# Patient Record
Sex: FEMALE | Race: WHITE | NOT HISPANIC OR LATINO | Employment: OTHER | URBAN - METROPOLITAN AREA
[De-identification: names, ages, dates, MRNs, and addresses within clinical notes are randomized per-mention and may not be internally consistent; named-entity substitution may affect disease eponyms.]

---

## 2017-04-26 ENCOUNTER — OFFICE VISIT (OUTPATIENT)
Dept: OPTOMETRY | Facility: CLINIC | Age: 47
End: 2017-04-26

## 2017-04-26 DIAGNOSIS — H52.03 HYPEROPIA WITH PRESBYOPIA, BILATERAL: Primary | ICD-10-CM

## 2017-04-26 DIAGNOSIS — H52.4 HYPEROPIA WITH PRESBYOPIA, BILATERAL: Primary | ICD-10-CM

## 2017-04-26 ASSESSMENT — REFRACTION_CURRENTRX
OS_BRAND: BIOFINITY MULTIFOCAL
OD_DIAMETER: 14.0
OS_ADD: +1.00D
OD_SPHERE: +2.00
OD_BRAND: BIOFINITY MULTIFOCAL
OS_BASECURVE: 8.6
OD_SPHERE: +2.00
OS_SPHERE: +2.00
OS_DIAMETER: 14.0
OS_BRAND: BIOFINITY MULTIFOCAL
OD_BRAND: BIOFINITY
OD_BASECURVE: 8.6
OD_DIAMETER: 14.0
OD_SPHERE: +2.00
OS_SPHERE: +1.75
OS_BASECURVE: 8.6
OD_ADD: +1.50D
OD_BASECURVE: 8.6
OD_DIAMETER: 14.0
OD_BASECURVE: 8.6
OD_SPHERE: +2.25
OD_BASECURVE: 8.6
OD_BRAND: BIOFINITY MULTIFOCAL
OD_BRAND: BIOFINITY MULTIFOCAL
OS_SPHERE: +2.00
OS_BRAND: BIOFINITY MULTIFOCAL
OS_BASECURVE: 8.6
OS_ADD: +1.50N
OS_DIAMETER: 14.0
OD_ADD: +1.50D
OS_BASECURVE: 8.6
OD_SPHERE: +2.25
OD_BASECURVE: 8.6
OS_BRAND: BIOFINITY
OS_DIAMETER: 14.0
OD_BRAND: BIOFINITY
OS_BASECURVE: 8.6
OS_SPHERE: +2.00
OD_ADD: +1.00D
OS_ADD: +1.50D
OS_DIAMETER: 14.0
OS_ADD: +1.50D
OD_DIAMETER: 14.0
OD_DIAMETER: 14.0
OS_SPHERE: +1.75
OS_BRAND: BIOFINITY MULTIFOCAL
OS_DIAMETER: 14.0

## 2017-04-26 ASSESSMENT — REFRACTION_MANIFEST
OS_ADD: +1.50
OS_CYLINDER: +0.75
OD_SPHERE: +1.75
OD_AXIS: 175
OS_AXIS: 005
OD_ADD: +1.50
OS_SPHERE: +1.50
OD_CYLINDER: +0.75

## 2017-04-26 ASSESSMENT — REFRACTION_WEARINGRX
OS_CYLINDER: +0.75
OD_CYLINDER: +0.50
OD_SPHERE: +1.50
OS_SPHERE: +1.00
OD_AXIS: 160
OS_AXIS: 180

## 2017-04-26 ASSESSMENT — SLIT LAMP EXAM - LIDS
COMMENTS: NORMAL
COMMENTS: NORMAL

## 2017-04-26 ASSESSMENT — VISUAL ACUITY
OD_CC: 20/20
OS_CC: 20/20-1
CORRECTION_TYPE: GLASSES
METHOD: SNELLEN - LINEAR

## 2017-04-26 ASSESSMENT — TONOMETRY
OD_IOP_MMHG: 11
OS_IOP_MMHG: 12
IOP_METHOD: APPLANATION

## 2017-04-26 ASSESSMENT — EXTERNAL EXAM - RIGHT EYE: OD_EXAM: NORMAL

## 2017-04-26 ASSESSMENT — EXTERNAL EXAM - LEFT EYE: OS_EXAM: NORMAL

## 2017-04-26 ASSESSMENT — CONF VISUAL FIELD
OS_NORMAL: 1
OD_NORMAL: 1

## 2017-04-26 ASSESSMENT — CUP TO DISC RATIO
OS_RATIO: 0.25
OD_RATIO: 0.25

## 2017-04-26 NOTE — MR AVS SNAPSHOT
After Visit Summary   4/26/2017    Edwina Wynne    MRN: [id number]           Patient Information     Date Of Birth          1970        Visit Information        Provider Department      4/26/2017 1:30 PM Damari Dunn, [facility]        Today's Diagnoses     Hyperopia with presbyopia, bilateral    -  1       Follow-ups after your visit        Who to contact     Please call your clinic at [phone] to:    Ask questions about your health    Make or cancel appointments    Discuss your medicines    Learn about your test results    Speak to your doctor   If you have compliments or concerns about an experience at your clinic, or if you wish to file a complaint, please contact [facility] Patient Relations at [phone] or email us at [email or web address]         Additional Information About Your Visit        MyChart Information     [email or web address] gives you secure access to your electronic health record. If you see a primary care provider, you can also send messages to your care team and make appointments. If you have questions, please call your primary care clinic.  If you do not have a primary care provider, please call [phone] and they will assist you.      INNFOCUS is an electronic gateway that provides easy, online access to your medical records. With INNFOCUS, you can request a clinic appointment, read your test results, renew a prescription or communicate with your care team.     To access your existing account, please contact your [facility] or call [phone] for assistance.        Care EveryWhere ID     This is your Care EveryWhere ID. This could be used by other organizations to access your [place] medical records  [id number]         Blood Pressure from Last 3 Encounters:   10/13/16 129/78   10/12/16 128/74   09/27/16 128/72    Weight from Last 3 Encounters:   10/13/16 71.2 kg (157 lb)    10/12/16 71.2 kg (157 lb)   09/27/16 73.4 kg (161 lb 12.8 oz)              We Performed the Following     HC CONTACT LENS FITTING COSMETIC LVL 1 (88804.011)     REFRACTION [93621]        Primary Care Provider Office Phone # Fax #    Blanca Jennifer Heard [phone]       [facility] [address] [place], [place] [address]E [facility] [address]        Thank you!     Thank you for choosing EYE CLINIC  for your care. Our goal is always to provide you with excellent care. Hearing back from our patients is one way we can continue to improve our services. Please take a few minutes to complete the written survey that you may receive in the mail after your visit with us. Thank you!             Your Updated Medication List - Protect others around you: Learn how to safely use, store and throw away your medicines at www.disposemymeds.org.          This list is accurate as of: 4/26/17 11:59 PM.  Always use your most recent med list.                   Brand Name Dispense Instructions for use    atenolol 50 MG tablet    TENORMIN    90 tablet    Take 1 tablet (50 mg) by mouth daily       CALCIUM + D PO      Take 1 tablet by mouth daily.       IBUPROFEN PO      Take 600 mg by mouth every 4 hours as needed for moderate pain

## 2017-04-27 NOTE — PROGRESS NOTES
A/P  1.) Hyperopia/Presbyopia OU  -Now symptomatic for near - updated spec Rx to include bifocal  -Refit to Biofinity MF, but does not tolerate any distance blur on dominant OD lens  -Trial with distance OD, distance center MF OS. Reviewed adaptation and expectations  -Okay to order if working well, otherwise RTC prn for recheck

## 2017-05-26 DIAGNOSIS — I10 ESSENTIAL HYPERTENSION: ICD-10-CM

## 2017-05-26 NOTE — TELEPHONE ENCOUNTER
Atenolol 50mg  Last Written Prescription Date: 2/17/16  Last Fill Quantity: 90, # refills: 3    Last Office Visit with G, P or [facility] prescribing provider:  4/26/17   Future Office Visit:        BP Readings from Last 3 Encounters:   10/13/16 129/78   10/12/16 128/74   09/27/16 128/72

## 2017-05-29 RX ORDER — ATENOLOL 50 MG/1
50 TABLET ORAL DAILY
Qty: 90 TABLET | Refills: 3 | Status: SHIPPED | OUTPATIENT
Start: 2017-05-29 | End: 2017-07-12

## 2017-07-12 ENCOUNTER — OFFICE VISIT (OUTPATIENT)
Dept: INTERNAL MEDICINE | Facility: CLINIC | Age: 47
End: 2017-07-12
Attending: INTERNAL MEDICINE
Payer: COMMERCIAL

## 2017-07-12 VITALS
HEART RATE: 91 BPM | DIASTOLIC BLOOD PRESSURE: 78 MMHG | WEIGHT: 173 LBS | SYSTOLIC BLOOD PRESSURE: 120 MMHG | BODY MASS INDEX: 27.8 KG/M2 | HEIGHT: 66 IN

## 2017-07-12 DIAGNOSIS — N89.8 VAGINAL DISCHARGE: ICD-10-CM

## 2017-07-12 DIAGNOSIS — I10 ESSENTIAL HYPERTENSION: ICD-10-CM

## 2017-07-12 DIAGNOSIS — R21 RASH AND NONSPECIFIC SKIN ERUPTION: ICD-10-CM

## 2017-07-12 DIAGNOSIS — K64.4 EXTERNAL HEMORRHOIDS: ICD-10-CM

## 2017-07-12 DIAGNOSIS — Z00.00 ROUTINE GENERAL MEDICAL EXAMINATION AT A HEALTH CARE FACILITY: Primary | ICD-10-CM

## 2017-07-12 LAB
MICRO REPORT STATUS: NORMAL
SPECIMEN SOURCE: NORMAL
WET PREP SPEC: NORMAL

## 2017-07-12 PROCEDURE — 87210 SMEAR WET MOUNT SALINE/INK: CPT | Performed by: INTERNAL MEDICINE

## 2017-07-12 PROCEDURE — 99213 OFFICE O/P EST LOW 20 MIN: CPT | Mod: ZF

## 2017-07-12 PROCEDURE — 87591 N.GONORRHOEAE DNA AMP PROB: CPT | Performed by: INTERNAL MEDICINE

## 2017-07-12 PROCEDURE — 87491 CHLMYD TRACH DNA AMP PROBE: CPT | Performed by: INTERNAL MEDICINE

## 2017-07-12 RX ORDER — ATENOLOL 50 MG/1
50 TABLET ORAL DAILY
Qty: 90 TABLET | Refills: 3 | Status: SHIPPED | OUTPATIENT
Start: 2017-07-12 | End: 2017-10-10

## 2017-07-12 NOTE — LETTER
7/20/2017         Edwina Wynne   [address]  [facility] [address]        Dear Ms. Wynne:    Your labs were reviewed by Blanca Heard MD and are within acceptable ranges.  No changes are recommended.     Results for orders placed or performed in visit on 07/12/17   Wet Prep (Collected in Clinic)   Result Value Ref Range    Specimen Description Vagina     Wet Prep       No PMNs seen  No clue cells seen  No Trichomonas seen  No yeast seen      Micro Report Status FINAL 07/12/2017    Chlamydia PCR (Clinic Collect)   Result Value Ref Range    Specimen Description Cervical     Chlamydia Trachomatis PCR  NEG     Negative   Negative for C. trachomatis rRNA by transcription mediated amplification.   A negative result by transcription mediated amplification does not preclude the   presence of C. trachomatis infection because results are dependent on proper   and adequate collection, absence of inhibitors, and sufficient rRNA to be   detected.     Neisseria gonorrhoeae PCR   Result Value Ref Range    Specimen Descrip Cervical     N Gonorrhea PCR  NEG     Negative   Negative for N. gonorrhoeae rRNA by transcription mediated amplification.   A negative result by transcription mediated amplification does not preclude the   presence of N. gonorrhoeae infection because results are dependent on proper   and adequate collection, absence of inhibitors, and sufficient rRNA to be   detected.           Please note that test explanations are brief and do not reflect all diagnostic uses.  If you have any questions or concerns, please call the clinic at [phone].      Sincerely,      Brandy Jay sent on behalf of  Blanca Heard MD

## 2017-07-12 NOTE — LETTER
7/20/2017         Edwina Wynne   [address]  [facility] [address]        Dear Ms. Wynne:    Your wet prep was not consistent with infection.     Results for orders placed or performed in visit on 07/12/17   Wet Prep (Collected in Clinic)   Result Value Ref Range    Specimen Description Vagina     Wet Prep       No PMNs seen  No clue cells seen  No Trichomonas seen  No yeast seen      Micro Report Status FINAL 07/12/2017    Chlamydia PCR (Clinic Collect)   Result Value Ref Range    Specimen Description Cervical     Chlamydia Trachomatis PCR  NEG     Negative   Negative for C. trachomatis rRNA by transcription mediated amplification.   A negative result by transcription mediated amplification does not preclude the   presence of C. trachomatis infection because results are dependent on proper   and adequate collection, absence of inhibitors, and sufficient rRNA to be   detected.     Neisseria gonorrhoeae PCR   Result Value Ref Range    Specimen Descrip Cervical     N Gonorrhea PCR  NEG     Negative   Negative for N. gonorrhoeae rRNA by transcription mediated amplification.   A negative result by transcription mediated amplification does not preclude the   presence of N. gonorrhoeae infection because results are dependent on proper   and adequate collection, absence of inhibitors, and sufficient rRNA to be   detected.           Please note that test explanations are brief and do not reflect all diagnostic uses.  If you have any questions or concerns, please call the clinic at [phone].      Sincerely,      Brandy Jay sent on behalf of  Blanca Heard MD

## 2017-07-12 NOTE — PROGRESS NOTES
SUBJECTIVE:   CC: Edwina Wynne is an 47 year old woman who presents for preventive health visit.     Healthy Habits:    Do you get at least three servings of calcium containing foods daily (dairy, green leafy vegetables, etc.)? yes    Amount of exercise or daily activities, outside of work: not regualr    Problems taking medications regularly No    Medication side effects: No    Have you had an eye exam in the past two years? yes    Do you see a dentist twice per year? yes    Do you have sleep apnea, excessive snoring or daytime drowsiness?no      Patient reports vaginal discharge for the last few days. She describes it as foul smelling and white. She denies pain or fever. She denies new sexual partners. She is sexually active, not using condoms.   -------------------------------------    Today's PHQ-2 Score:   PHQ-2 ( 1999 Pf[employer]) 2/17/2016 6/7/2012   Q1: Little interest or pleasure in doing things - 0   Q2: Feeling down, depressed or hopeless - 1   PHQ-2 Score - 1   Q1: Little interest or pleasure in doing things Not at all -   Q2: Feeling down, depressed or hopeless Not at all -   PHQ-2 Score 0 -       Abuse: Current or Past(Physical, Sexual or Emotional)- No  Do you feel safe in your environment - Yes    Social History   Substance Use Topics     Smoking status: Former Smoker     Packs/day: 1.00     Years: 15.00     Types: Cigarettes     Quit date: 1/1/2000     Smokeless tobacco: Never Used     Alcohol use No      Comment: occas     The patient does not drink >3 drinks per day nor >7 drinks per week.    Reviewed orders with patient.  Reviewed health maintenance and updated orders accordingly - Yes  Labs reviewed in EPIC    Patient under age 50, mutual decision reflected in health maintenance.      Pertinent mammograms are reviewed under the imaging tab.  History of abnormal Pap smear: NO - age 30-65 PAP every 5 years with negative HPV co-testing recommended    Reviewed and updated as needed this  "visit by clinical staff  Tobacco  Allergies  Meds         Reviewed and updated as needed this visit by Provider        Past Medical History:   Diagnosis Date     Anemia     PREGNANCY     Herpes simplex without mention of complication      HSV      Hypertension     Gestational     PONV (postoperative nausea and vomiting)     POST C SECTION     Pre-eclampsia       Past Surgical History:   Procedure Laterality Date     C BREAST PROSTHESIS, NOS  2004      SECTION       HERNIORRHAPHY VENTRAL  10/26/2012    Procedure: HERNIORRHAPHY VENTRAL;  Open Ventral Hernia Repair  ;  Surgeon: Shaun Clinton MD;  Location: [facility] OR     LAPAROSCOPY DIAGNOSTIC (GYN) Bilateral 2016    Procedure: LAPAROSCOPY DIAGNOSTIC (GYN);  Surgeon: Ventura Flower MD;  Location: [facility]     LAPAROTOMY MINI, TUBAL LIGATION (POST PARTUM), COMBINED  2012    Procedure:COMBINED LAPAROTOMY MINI, TUBAL LIGATION (POST PARTUM); Surgeon:RAVEN YARBROUGH; Location:[facility] L[facility]D     TUBOPLASTY REANASTOMOSIS Bilateral 2016    Procedure: TUBOPLASTY REANASTOMOSIS;  Surgeon: Ventura Flower MD;  Location: [facility]       ROS:  C: NEGATIVE for fever, chills, change in weight  I: NEGATIVE for worrisome rashes, moles or lesions  E: NEGATIVE for vision changes or irritation  ENT: NEGATIVE for ear, mouth and throat problems  R: NEGATIVE for significant cough or SOB  B: NEGATIVE for masses, tenderness or discharge  CV: NEGATIVE for chest pain, palpitations or peripheral edema  GI: NEGATIVE for nausea, abdominal pain, heartburn, or change in bowel habits  : NEGATIVE for unusual urinary or vaginal symptoms. Periods are regular.  M: NEGATIVE for significant arthralgias or myalgia  N: NEGATIVE for weakness, dizziness or paresthesias  P: NEGATIVE for changes in mood or affect    OBJECTIVE:   /78  Pulse 91  Ht 1.676 m (5' 6\")  Wt 78.5 kg (173 lb)  LMP 2017  Breastfeeding? No  BMI 27.92 kg/m2  EXAM:  GENERAL: healthy, alert and no " distress  EYES: Eyes grossly normal to inspection, PERRL and conjunctivae and sclerae normal  HENT: ear canals and TM's normal, nose and mouth without ulcers or lesions  NECK: no adenopathy, no asymmetry, masses, or scars and thyroid normal to palpation  RESP: lungs clear to auscultation - no rales, rhonchi or wheezes  CV: regular rate and rhythm, normal S1 S2, no S3 or S4, no murmur, click or rub, no peripheral edema and peripheral pulses strong  ABDOMEN: soft, nontender, no hepatosplenomegaly, no masses and bowel sounds normal  : normal external genitalia, cervix without masses or lesions, scant vaginal discharge present  MS: no gross musculoskeletal defects noted, no edema  SKIN: no suspicious lesions or rashes  NEURO: Normal strength and tone, mentation intact and speech normal  PSYCH: mentation appears normal, affect normal/bright    ASSESSMENT/PLAN:   1. Routine general medical examination at a health care facility  Patient is up to date on PAP smear. Recommend screening for hyperlipidemia and impaired glucose metabolism. Vaccines are up to date.   - Lipid Profile; Future  - Basic Metabolic Panel; Future    2. Essential hypertension  Blood pressure is at goal. Will continue with current medical therapy without changes.   - atenolol (TENORMIN) 50 MG tablet; Take 1 tablet (50 mg) by mouth daily  Dispense: 90 tablet; Refill: 3    3. Vaginal discharge  Wet prep and GC/chlamydia swabs were collected today. She will be advised on test results accordingly.   - Wet Prep (Collected in Clinic)  - Chlamydia PCR (Clinic Collect)  - Neisseria gonorrhoeae PCR    4. External hemorrhoids  Referral to colorectal Surgery was given to the patient.   - COLORECTAL SURGERY REFERRAL    5. Rash and nonspecific skin eruption  Referral to Dermatology for skin exam was entered.  - DERMATOLOGY REFERRAL    COUNSELING:   Reviewed preventive health counseling, as reflected in patient instructions       Regular exercise       Healthy  "diet/nutrition       Vision screening       Hearing screening         reports that she quit smoking about 17 years ago. Her smoking use included Cigarettes. She has a 15.00 pack-year smoking history. She has never used smokeless tobacco.    Estimated body mass index is 27.92 kg/(m^2) as calculated from the following:    Height as of this encounter: 1.676 m (5' 6\").    Weight as of this encounter: 78.5 kg (173 lb).       Counseling Resources:  ATP IV Guidelines  Pooled Cohorts Equation Calculator  Breast Cancer Risk Calculator  FRAX Risk Assessment  ICSI Preventive Guidelines  Dietary Guidelines for Americans, 2010  [employer] [employer]'s MyPlate  ASA Prophylaxis  Lung CA Screening    Blanca Heard MD  [facility]   "

## 2017-07-12 NOTE — MR AVS SNAPSHOT
After Visit Summary   7/12/2017    Edwina Wynne    MRN: [id number]           Patient Information     Date Of Birth          1970        Visit Information        Provider Department      7/12/2017 12:40 PM Blanca Heard MD [facility]         Today's Diagnoses     Routine general medical examination at a health care facility    -  1    Essential hypertension        Vaginal discharge        External hemorrhoids        Rash and nonspecific skin eruption          Care Instructions      Preventive Health Recommendations  Female Ages 40 to 49    Yearly exam:     See your health care provider every year in order to  1. Review health changes.   2. Discuss preventive care.    3. Review your medicines if your doctor prescribed any.      Get a Pap test every three years (unless you have an abnormal result and your provider advises testing more often).      If you get Pap tests with HPV test, you only need to test every 5 years, unless you have an abnormal result. You do not need a Pap test if your uterus was removed (hysterectomy) and you have not had cancer.      You should be tested each year for STDs (sexually transmitted diseases), if you're at risk.       Ask your doctor if you should have a mammogram.      Have a colonoscopy (test for colon cancer) if someone in your family has had colon cancer or polyps before age 50.       Have a cholesterol test every 5 years.       Have a diabetes test (fasting glucose) after age 45. If you are at risk for diabetes, you should have this test every 3 years.    Shots: Get a flu shot each year. Get a tetanus shot every 10 years.     Nutrition:     Eat at least 5 servings of fruits and vegetables each day.    Eat whole-grain bread, whole-wheat pasta and brown rice instead of white grains and rice.    Talk to your provider about Calcium and Vitamin D.     Lifestyle    Exercise at least 150 minutes a week (an average of 30  minutes a day, 5 days a week). This will help you control your weight and prevent disease.    Limit alcohol to one drink per day.    No smoking.     Wear sunscreen to prevent skin cancer.    See your dentist every six months for an exam and cleaning.          Follow-ups after your visit        Additional Services     COLORECTAL SURGERY REFERRAL       Your provider has referred you to: [facility]: Colon and Rectal Surgery Clinic [facility] [phone]   [email or web address]    Referral Reason(s): Hemorrhoids  Special Concerns: None  This referral is: Elective (week +)  It is OK to leave a message on patient's voicemail.    Please be aware that coverage of these services is subject to the terms and limitations of your health insurance plan.  Call member services at your health plan with any benefit or coverage questions.      Please bring the following with you to your appointment:    (1) Any X-Rays, CTs or MRIs which have been performed.  Contact the facility where they were done to arrange for  prior to your scheduled appointment.    (2) List of current medications  (3) This referral request   (4) Any documents/labs given to you for this referral            DERMATOLOGY REFERRAL       Your provider has referred you to: [facility]: Dermatology [facility] [phone]   [email or web address]    Please be aware that coverage of these services is subject to the terms and limitations of your health insurance plan.  Call member services at your health plan with any benefit or coverage questions.      Please bring the following with you to your appointment:    (1) Any X-Rays, CTs or MRIs which have been performed.  Contact the facility where they were done to arrange for  prior to your scheduled appointment.    (2) List of current medications  (3) This referral request   (4) Any documents/labs given to you for this referral    "               Who to contact     Please call your clinic at [phone] to:    Ask questions about your health    Make or cancel appointments    Discuss your medicines    Learn about your test results    Speak to your doctor   If you have compliments or concerns about an experience at your clinic, or if you wish to file a complaint, please contact [facility] Patient Relations at [phone] or email us at [email or web address]         Additional Information About Your Visit        TSAT Grouphart Information     N4MDt gives you secure access to your electronic health record. If you see a primary care provider, you can also send messages to your care team and make appointments. If you have questions, please call your primary care clinic.  If you do not have a primary care provider, please call [phone] and they will assist you.      Convo is an electronic gateway that provides easy, online access to your medical records. With Convo, you can request a clinic appointment, read your test results, renew a prescription or communicate with your care team.     To access your existing account, please contact your [facility] or call [phone] for assistance.        Care EveryWhere ID     This is your Care EveryWhere ID. This could be used by other organizations to access your [place] medical records  [id number]        Your Vitals Were     Pulse Height Last Period Breastfeeding? BMI (Body Mass Index)       91 1.676 m (5' 6\") 06/25/2017 No 27.92 kg/m2        Blood Pressure from Last 3 Encounters:   07/12/17 120/78   10/13/16 129/78   10/12/16 128/74    Weight from Last 3 Encounters:   07/12/17 78.5 kg (173 lb)   10/13/16 71.2 kg (157 lb)   10/12/16 71.2 kg (157 lb)              We Performed the Following     Basic Metabolic Panel     Chlamydia PCR (Clinic Collect)     COLORECTAL SURGERY REFERRAL     DERMATOLOGY REFERRAL     Lipid Profile     " Neisseria gonorrhoeae PCR     Wet Prep (Collected in Clinic)          Where to get your medicines      These medications were sent to [facility] - [place] [employer], [place] - [address]   [address] , [facility] [address]    Hours:  Mon-Fri 8:30am-5:00pm Toll Free [phone] Phone:  [phone]     atenolol 50 MG tablet          Primary Care Provider Office Phone # Fax #    Blanca Jennifer Heard [phone]       [facility] [address]  [facility] [address]        Equal Access to Services     [facility]: Hadii jeanine Rodriguez, waesauda ning, qaybta kaalmada emily, padmini tejada . So [facility] [phone].    ATENCIÓN: Si habla español, tiene a knight disposición servicios gratuitos de asistencia lingüística. Ll[facility] [phone].    We comply with applicable federal civil rights laws and [place] laws. We do not discriminate on the basis of race, color, national origin, age, disability sex, sexual orientation or gender identity.            Thank you!     Thank you for choosing [facility]   for your care. Our goal is always to provide you with excellent care. Hearing back from our patients is one way we can continue to improve our services. Please take a few minutes to complete the written survey that you may receive in the mail after your visit with us. Thank you!             Your Updated Medication List - Protect others around you: Learn how to safely use, store and throw away your medicines at www.disposemymeds.org.          This list is accurate as of: 7/12/17  1:13 PM.  Always use your most recent med list.                   Brand Name Dispense Instructions for use Diagnosis    atenolol 50 MG tablet    TENORMIN    90 tablet    Take 1 tablet (50 mg) by mouth daily    Essential hypertension       CALCIUM + D PO      Take 1 tablet by mouth daily.        IBUPROFEN PO      Take 600 mg  by mouth every 4 hours as needed for moderate pain

## 2017-07-13 LAB
C TRACH DNA SPEC QL NAA+PROBE: NORMAL
N GONORRHOEA DNA SPEC QL NAA+PROBE: NORMAL
SPECIMEN SOURCE: NORMAL
SPECIMEN SOURCE: NORMAL

## 2017-07-17 ENCOUNTER — PRE VISIT (OUTPATIENT)
Dept: SURGERY | Facility: CLINIC | Age: 47
End: 2017-07-17

## 2017-07-17 NOTE — TELEPHONE ENCOUNTER
1.  Date/reason for appt: 8/1/17- hemorrhoid     2.  Referring provider: Dr. Blanca Heard     3.  Call to patient (Yes / No - short description): No - pt is referred. Records are in Epic.     4.  Previous care at / records requested from:     1. [facility]  7/12/17

## 2017-08-10 ENCOUNTER — OFFICE VISIT (OUTPATIENT)
Dept: SURGERY | Facility: CLINIC | Age: 47
End: 2017-08-10

## 2017-08-10 VITALS
DIASTOLIC BLOOD PRESSURE: 83 MMHG | SYSTOLIC BLOOD PRESSURE: 130 MMHG | HEIGHT: 66 IN | BODY MASS INDEX: 27.43 KG/M2 | HEART RATE: 54 BPM | OXYGEN SATURATION: 98 % | WEIGHT: 170.7 LBS

## 2017-08-10 DIAGNOSIS — K64.4 ANAL SKIN TAG: Primary | ICD-10-CM

## 2017-08-10 RX ORDER — KETOCONAZOLE 20 MG/ML
SHAMPOO TOPICAL
Refills: 11 | COMMUNITY
Start: 2017-08-03 | End: 2021-02-10

## 2017-08-10 ASSESSMENT — ENCOUNTER SYMPTOMS
SLEEP DISTURBANCES DUE TO BREATHING: 0
LEG SWELLING: 0
EXERCISE INTOLERANCE: 0
TACHYCARDIA: 0
HYPERTENSION: 1
ORTHOPNEA: 0
LEG PAIN: 1
SYNCOPE: 0
CLAUDICATION: 0
PALPITATIONS: 0
SKIN CHANGES: 0
HYPOTENSION: 0
NAIL CHANGES: 0
POOR WOUND HEALING: 0
LIGHT-HEADEDNESS: 1

## 2017-08-10 ASSESSMENT — PAIN SCALES - GENERAL: PAINLEVEL: NO PAIN (0)

## 2017-08-10 NOTE — LETTER
8/10/2017      RE: Edwina Wynne  [address]  [facility] [address]       Colon and Rectal Surgery Consult Clinic Note    Date: 8/10/2017     Referring provider:  Blanca Heard MD  [facility]  [address] [place] [address]     RE: Edwina Wynne  : 1970  VISH: 8/10/2017    Edwina Wynne is a very pleasant 47 year old female without a significant past medical history with a recent diagnosis of external hemorrhoid.  Given these findings they were subsequently sent to the Colon and Rectal Surgery Clinic for an opinion on this and a new patient consultation.     Edwina developed an external hemorrhoid about 5 years ago with the birth of her last child. This is never resolved and she would like further management of it if possible. It is not painful and she denies any bleeding. It is always on the outside and she denies any tissue that comes in and out with bowel movements. She does have some difficulty keeping it clean after bowel movements. She states that it is more of an annoyance than anything. She has never had any anorectal surgical procedures in the past. She is not on any blood thinners. She denies any leakage of stool or mucus. She has normal, soft bowel movements without any constipation or diarrhea.    Assessment/Plan: 47 year old female with anal skin tag. On exam she has an anal skin tag in the anterior midline that measures just over 1 cm long. This is a fairly wide base so would not be amenable to removal by me in clinic today. We did discuss surgical removal of this. I discussed with her the risks of pain, bleeding, and that we cannot promise a good cosmetic result and that she can develop more skin tags even from the surgery itself. She states an understanding of these risks. She would like to think about her options and will get back to us on whether she wants further intervention. In the meantime I advised her to try using  baby wipes instead of toilet paper and to notify the clinic if she has any further questions or concerns.  Patient's questions were answered to her stated satisfaction and she is in agreement with this plan.    Medical history:  Past Medical History:   Diagnosis Date     Anemia     PREGNANCY     Herpes simplex without mention of complication      HSV      Hypertension     Gestational     PONV (postoperative nausea and vomiting)     POST C SECTION     Pre-eclampsia        Surgical history:  Past Surgical History:   Procedure Laterality Date     C BREAST PROSTHESIS, NOS  2004      SECTION       HERNIORRHAPHY VENTRAL  10/26/2012    Procedure: HERNIORRHAPHY VENTRAL;  Open Ventral Hernia Repair  ;  Surgeon: Shaun Clinton MD;  Location: [facility]     LAPAROSCOPY DIAGNOSTIC (GYN) Bilateral 2016    Procedure: LAPAROSCOPY DIAGNOSTIC (GYN);  Surgeon: Ventura Flower MD;  Location: [facility]     LAPAROTOMY MINI, TUBAL LIGATION (POST PARTUM), COMBINED  2012    Procedure:COMBINED LAPAROTOMY MINI, TUBAL LIGATION (POST PARTUM); Surgeon:RAVEN YARBROUGH; Location: [facility]+D     TUBOPLASTY REANASTOMOSIS Bilateral 2016    Procedure: TUBOPLASTY REANASTOMOSIS;  Surgeon: Ventura Flower MD;  Location: [facility]       Problem list:  Patient Active Problem List    Diagnosis Date Noted     Tubal occlusion 2016     Priority: Medium     Essential hypertension, benign 2013     Priority: Medium     Tubal ligation status 2013     Priority: Medium     Ventral hernia, recurrent 2013     Priority: Medium       Medications:  Current Outpatient Prescriptions   Medication Sig Dispense Refill     ketoconazole (NIZORAL) 2 % shampoo   11     atenolol (TENORMIN) 50 MG tablet Take 1 tablet (50 mg) by mouth daily 90 tablet 3     IBUPROFEN PO Take 600 mg by mouth every 4 hours as needed for moderate pain       Calcium Carbonate-Vitamin D (CALCIUM + D PO) Take 1 tablet by mouth daily.         Allergies:  No Known  "Allergies    Family history:  Family History   Problem Relation Age of Onset     Hypertension Father      Hypertension Brother      CEREBROVASCULAR DISEASE Maternal Grandmother 50     Macular Degeneration Paternal Grandmother      Eye Disorder Other      Great aunt, retinitis pigmentosa     Asthma No family hx of      C.A.D. No family hx of      DIABETES No family hx of      Breast Cancer No family hx of      Cancer - colorectal No family hx of      Prostate Cancer No family hx of      Glaucoma No family hx of        Social history:  Social History   Substance Use Topics     Smoking status: Former Smoker     Packs/day: 1.00     Years: 15.00     Types: Cigarettes     Quit date: 1/1/2000     Smokeless tobacco: Never Used     Alcohol use No      Comment: occas    Marital status: .  Occupation:  provider and nursing assistant.    Nursing Notes:   Kalyan Sanabria CMA  8/10/2017 11:14 AM  Signed  Chief Complaint   Patient presents with     Consult     Hemorrhoids       Vitals:    08/10/17 1112   BP: 130/83   Pulse: 54   SpO2: 98%   Weight: 170 lb 11.2 oz   Height: 5' 6\"       Body mass index is 27.55 kg/(m^2).    Kalyan Sanabria CMA                               Physical Examination:  /83  Pulse 54  Ht 5' 6\"  Wt 170 lb 11.2 oz  LMP 06/25/2017  SpO2 98%  BMI 27.55 kg/m2  General: alert, oriented, in no acute distress, sitting comfortably  HEENT: mucous membranes moist  Perianal external examination:  Perianal skin: Intact with no excoriation or lichenification.  Lesions: No evidence of an external lesion, nodularity, or induration in the perianal region.  Eversion of buttocks: There was not evidence of an anal fissure. Details: N/A.  Skin tags or external hemorrhoids: Yes: skin tag in the anterior midline measuring just over 1 cm long with a wide base and a smaller skin tag in the posterior midline. No bleeding, tenderness, drainage, or thrombosis..  Digital rectal examination: Was " deferred    Anoscopy: Was deferred    Total face to face time was 20 minutes, >50% counseling.    ADDIE Heck, NP-C  Colon and Rectal Surgery   [facility]    This note was created using speech recognition software and may contain unintended word substitutions.      ADDIE Heck CNP

## 2017-08-10 NOTE — PROGRESS NOTES
Colon and Rectal Surgery Consult Clinic Note    Date: 8/10/2017     Referring provider:  Blanca Heard MD  [facility]  [address] [place] [address]     RE: Edwina Wynne  : 1970  VISH: 8/10/2017    Edwina Wynne is a very pleasant 47 year old female without a significant past medical history with a recent diagnosis of external hemorrhoid.  Given these findings they were subsequently sent to the Colon and Rectal Surgery Clinic for an opinion on this and a new patient consultation.     Edwina developed an external hemorrhoid about 5 years ago with the birth of her last child. This is never resolved and she would like further management of it if possible. It is not painful and she denies any bleeding. It is always on the outside and she denies any tissue that comes in and out with bowel movements. She does have some difficulty keeping it clean after bowel movements. She states that it is more of an annoyance than anything. She has never had any anorectal surgical procedures in the past. She is not on any blood thinners. She denies any leakage of stool or mucus. She has normal, soft bowel movements without any constipation or diarrhea.    Assessment/Plan: 47 year old female with anal skin tag. On exam she has an anal skin tag in the anterior midline that measures just over 1 cm long. This is a fairly wide base so would not be amenable to removal by me in clinic today. We did discuss surgical removal of this. I discussed with her the risks of pain, bleeding, and that we cannot promise a good cosmetic result and that she can develop more skin tags even from the surgery itself. She states an understanding of these risks. She would like to think about her options and will get back to us on whether she wants further intervention. In the meantime I advised her to try using baby wipes instead of toilet paper and to notify the clinic if she has any further  questions or concerns.  Patient's questions were answered to her stated satisfaction and she is in agreement with this plan.    Medical history:  Past Medical History:   Diagnosis Date     Anemia     PREGNANCY     Herpes simplex without mention of complication      HSV      Hypertension     Gestational     PONV (postoperative nausea and vomiting)     POST C SECTION     Pre-eclampsia        Surgical history:  Past Surgical History:   Procedure Laterality Date     C BREAST PROSTHESIS, NOS  2004      SECTION       HERNIORRHAPHY VENTRAL  10/26/2012    Procedure: HERNIORRHAPHY VENTRAL;  Open Ventral Hernia Repair  ;  Surgeon: Shaun Clinton MD;  Location: [facility]     LAPAROSCOPY DIAGNOSTIC (GYN) Bilateral 2016    Procedure: LAPAROSCOPY DIAGNOSTIC (GYN);  Surgeon: Ventura Flower MD;  Location: [facility]     LAPAROTOMY MINI, TUBAL LIGATION (POST PARTUM), COMBINED  2012    Procedure:COMBINED LAPAROTOMY MINI, TUBAL LIGATION (POST PARTUM); Surgeon:RAVEN YARBROUGH; Location: [facility]     TUBOPLASTY REANASTOMOSIS Bilateral 2016    Procedure: TUBOPLASTY REANASTOMOSIS;  Surgeon: Ventura Flower MD;  Location: [facility]       Problem list:  Patient Active Problem List    Diagnosis Date Noted     Tubal occlusion 2016     Priority: Medium     Essential hypertension, benign 2013     Priority: Medium     Tubal ligation status 2013     Priority: Medium     Ventral hernia, recurrent 2013     Priority: Medium       Medications:  Current Outpatient Prescriptions   Medication Sig Dispense Refill     ketoconazole (NIZORAL) 2 % shampoo   11     atenolol (TENORMIN) 50 MG tablet Take 1 tablet (50 mg) by mouth daily 90 tablet 3     IBUPROFEN PO Take 600 mg by mouth every 4 hours as needed for moderate pain       Calcium Carbonate-Vitamin D (CALCIUM + D PO) Take 1 tablet by mouth daily.         Allergies:  No Known Allergies    Family history:  Family History   Problem Relation Age of Onset      "Hypertension Father      Hypertension Brother      CEREBROVASCULAR DISEASE Maternal Grandmother 50     Macular Degeneration Paternal Grandmother      Eye Disorder Other      Great aunt, retinitis pigmentosa     Asthma No family hx of      C.A.D. No family hx of      DIABETES No family hx of      Breast Cancer No family hx of      Cancer - colorectal No family hx of      Prostate Cancer No family hx of      Glaucoma No family hx of        Social history:  Social History   Substance Use Topics     Smoking status: Former Smoker     Packs/day: 1.00     Years: 15.00     Types: Cigarettes     Quit date: 1/1/2000     Smokeless tobacco: Never Used     Alcohol use No      Comment: occas    Marital status: .  Occupation:  provider and nursing assistant.    Nursing Notes:   Kalyan Sanabria CMA  8/10/2017 11:14 AM  Signed  Chief Complaint   Patient presents with     Consult     Hemorrhoids       Vitals:    08/10/17 1112   BP: 130/83   Pulse: 54   SpO2: 98%   Weight: 170 lb 11.2 oz   Height: 5' 6\"       Body mass index is 27.55 kg/(m^2).    Kalyan Sanabria CMA                               Physical Examination:  /83  Pulse 54  Ht 5' 6\"  Wt 170 lb 11.2 oz  LMP 06/25/2017  SpO2 98%  BMI 27.55 kg/m2  General: alert, oriented, in no acute distress, sitting comfortably  HEENT: mucous membranes moist  Perianal external examination:  Perianal skin: Intact with no excoriation or lichenification.  Lesions: No evidence of an external lesion, nodularity, or induration in the perianal region.  Eversion of buttocks: There was not evidence of an anal fissure. Details: N/A.  Skin tags or external hemorrhoids: Yes: skin tag in the anterior midline measuring just over 1 cm long with a wide base and a smaller skin tag in the posterior midline. No bleeding, tenderness, drainage, or thrombosis..  Digital rectal examination: Was deferred    Anoscopy: Was deferred    Total face to face time was 20 minutes, >50% " counseling.    ADDIE Heck, NP-C  Colon and Rectal Surgery   [facility]    This note was created using speech recognition software and may contain unintended word substitutions.

## 2017-08-10 NOTE — NURSING NOTE
"Chief Complaint   Patient presents with     Consult     Hemorrhoids       Vitals:    08/10/17 1112   BP: 130/83   Pulse: 54   SpO2: 98%   Weight: 170 lb 11.2 oz   Height: 5' 6\"       Body mass index is 27.55 kg/(m^2).    Kalyan Sanabria CMA                            "

## 2017-08-10 NOTE — MR AVS SNAPSHOT
After Visit Summary   8/10/2017    Edwina Wynne    MRN: [id number]           Patient Information     Date Of Birth          1970        Visit Information        Provider Department      8/10/2017 11:30 AM Samra Vincent APRN Pending sale to [facility] Colon and Rectal Surgery        Today's Diagnoses     Anal skin tag    -  1       Follow-ups after your visit        Your next 10 appointments already scheduled     Oct 06, 2017 10:45 AM CDT   (Arrive by 10:30 AM)   New Patient Visit with Nathanael Dietrich MD   [facility] ([facility])    [address] [place] [id number] [phone]              Who to contact     Please call your clinic at [phone] to:    Ask questions about your health    Make or cancel appointments    Discuss your medicines    Learn about your test results    Speak to your doctor   If you have compliments or concerns about an experience at your clinic, or if you wish to file a complaint, please contact [facility] Patient Relations at [phone] or email us at [email or web address]         Additional Information About Your Visit        MyChart Information     [employer] gives you secure access to your electronic health record. If you see a primary care provider, you can also send messages to your care team and make appointments. If you have questions, please call your primary care clinic.  If you do not have a primary care provider, please call [phone] and they will assist you.      Swype is an electronic gateway that provides easy, online access to your medical records. With Swype, you can request a clinic appointment, read your test results, renew a prescription or communicate with your care team.     To access your existing account, please contact your [facility] or call [phone] for assistance.        Care EveryWhere  "ID     This is your Care EveryWhere ID. This could be used by other organizations to access your [facility] medical records  [id number]        Your Vitals Were     Pulse Height Last Period Pulse Oximetry BMI (Body Mass Index)       54 5' 6\" 06/25/2017 98% 27.55 kg/m2        Blood Pressure from Last 3 Encounters:   08/10/17 130/83   07/12/17 120/78   10/13/16 129/78    Weight from Last 3 Encounters:   08/10/17 170 lb 11.2 oz   07/12/17 173 lb   10/13/16 157 lb              Today, you had the following     No orders found for display       Primary Care Provider Office Phone # Fax #    [place] Jennifer Heard [phone]       [facility] [address]  [facility] [address]        Equal Access to Services     [facility]: Hadii aad ku hadasho Sodaniel, waaxda luqadaha, qaybta kaalmada emily, padmini tejada . So [facility] [phone].    ATENCIÓN: Si habla español, tiene a knight disposición servicios gratuitos de asistencia lingüística. Oc al [phone].    We comply with applicable federal civil rights laws and [place] laws. We do not discriminate on the basis of race, color, national origin, age, disability sex, sexual orientation or gender identity.            Thank you!     Thank you for choosing [facility] COLON AND RECTAL SURGERY  for your care. Our goal is always to provide you with excellent care. Hearing back from our patients is one way we can continue to improve our services. Please take a few minutes to complete the written survey that you may receive in the mail after your visit with us. Thank you!             Your Updated Medication List - Protect others around you: Learn how to safely use, store and throw away your medicines at www.disposemymeds.org.          This list is accurate as of: 8/10/17 11:55 AM.  Always use your most recent med list.                   Brand Name Dispense Instructions for use Diagnosis    atenolol 50 MG tablet    TENORMIN "    90 tablet    Take 1 tablet (50 mg) by mouth daily    Essential hypertension       CALCIUM + D PO      Take 1 tablet by mouth daily.        IBUPROFEN PO      Take 600 mg by mouth every 4 hours as needed for moderate pain        ketoconazole 2 % shampoo    NIZORAL

## 2017-08-17 ENCOUNTER — TRANSFERRED RECORDS (OUTPATIENT)
Dept: HEALTH INFORMATION MANAGEMENT | Facility: CLINIC | Age: 47
End: 2017-08-17

## 2017-08-23 ENCOUNTER — TELEPHONE (OUTPATIENT)
Dept: OBGYN | Facility: CLINIC | Age: 47
End: 2017-08-23

## 2017-08-23 NOTE — TELEPHONE ENCOUNTER
----- Message from Lawanda Pedraza sent at 8/23/2017  2:49 PM CDT -----  Regarding: Dizzy, Shortness of Breath- Dr. Heard  Contact: [phone]  PT states she has been experiencing dizzy spells on and off along with nausea and shortness of breath. PT was not experiencing this at the time of call and scheduled the next available.     If Dr. Heard thinks she needs to be seen sooner please follow up with PT at [phone].    Thank you!    Lawanda,  Call Center

## 2017-08-23 NOTE — TELEPHONE ENCOUNTER
Spoke with Edwina to discuss symptoms she reported to scheduling r/t dizzy spells, nausea, and SOB. She states that she first experienced these symptoms a year ago about every other month. She was seen by a different pcp and had a normal ekg. She says that these symptoms have slowly increased over the last year from monthly to weekly. She describes it as a feeling of lightheadedness and fogginess and worrying that she will pass out. She does NOT have shortness of breath, chest pain, blurry vision, or dizziness. She has never passed out before and does not feel like her heart is racing or is an abnormal rhythm that she can tell.     Advised that a message will be sent to Dr. Heard to see if pt should try a holter monitor before her appointment or if she thinks she should try anything else. Instructed pt on when to go to emergency room. She understood plan and had no further questions.

## 2017-08-31 NOTE — TELEPHONE ENCOUNTER
Spoke with Edwina who states that she decided to stop her atenelol because she forgot to take it a lot of the times anyway and was worried that her blood pressure was getting to low and causing her symptoms. Since she has stopped 4 days ago her symptoms have improved and her BPs have been about 112/60. Advised that if she has stopped she should continue to monitor her blood pressures daily for any changes or hypertension. She will follow up with Dr. Heard on 9/18 and will call if anything changes before then or be seen at [facility] for urgent symptoms.

## 2017-10-06 ENCOUNTER — OFFICE VISIT (OUTPATIENT)
Dept: DERMATOLOGY | Facility: CLINIC | Age: 47
End: 2017-10-06

## 2017-10-06 DIAGNOSIS — L70.8 OTHER ACNE: Primary | ICD-10-CM

## 2017-10-06 RX ORDER — TRETINOIN 0.25 MG/G
CREAM TOPICAL
Qty: 45 G | Refills: 6 | Status: SHIPPED | OUTPATIENT
Start: 2017-10-06 | End: 2020-01-13

## 2017-10-06 ASSESSMENT — PAIN SCALES - GENERAL: PAINLEVEL: NO PAIN (0)

## 2017-10-06 NOTE — LETTER
10/6/2017       RE: Edwina Wynne  [address]  [facility] [address]     Dear Colleague,    Thank you for referring your patient, Edwina Wynne, to the [facility] DERMATOLOGY at [facility]. Please see a copy of my visit note below.    [facility] Dermatology Note      Dermatology Problem List:  Dermatofibroma    Encounter Date: Oct 6, 2017    CC:   Chief Complaint   Patient presents with     Skin Check     Romana is here today for a skin check. No concerns         History of Present Illness:  This 47 year old female presents in self referral for rash on her elbows. Patient states that in July she had hot tub folliculitis on her neck and was treated with oral and topical clindamycin.  She first noticed a few small bumps on her elbows at that time that did not resemble her other rash.  Her rash resolved and the elbows did not bother her.  Within the last week she has noticed more of the bumps on her elbows. They are not painful or itchy.  She has put the topical clindamycin on her elbows and that has not helped.  She also notes one bump on her right knee. It has been there for several years and has gotten a little bigger and darker over the last few months.  It is not painful. There are no other concerning lesions.    Past Medical History:   Past Medical History:   Diagnosis Date     Anemia     PREGNANCY     Herpes simplex without mention of complication      HSV      Hypertension     Gestational     PONV (postoperative nausea and vomiting)     POST C SECTION     Pre-eclampsia      Past Surgical History:   Procedure Laterality Date     C BREAST PROSTHESIS, NOS  2004      SECTION       HERNIORRHAPHY VENTRAL  10/26/2012    Procedure: HERNIORRHAPHY VENTRAL;  Open Ventral Hernia Repair  ;  Surgeon: Shaun Clinton MD;  Location: [facility] OR     LAPAROSCOPY DIAGNOSTIC (GYN) Bilateral 2016    Procedure: LAPAROSCOPY DIAGNOSTIC  (GYN);  Surgeon: Ventura Flower MD;  Location: [facility]     LAPAROTOMY MINI, TUBAL LIGATION (POST PARTUM), COMBINED  5/24/2012    Procedure:COMBINED LAPAROTOMY MINI, TUBAL LIGATION (POST PARTUM); Surgeon:RAVEN YARBROUGH; [facility]     TUBOPLASTY REANASTOMOSIS Bilateral 9/27/2016    Procedure: TUBOPLASTY REANASTOMOSIS;  Surgeon: Ventura Flower MD;  Location: [facility]       Social History:  The patient works in a  . The patient admits to use of tanning beds years ago.    Family History:  There is no family history of skin cancer.    Medications:  Current Outpatient Prescriptions   Medication Sig Dispense Refill     ketoconazole (NIZORAL) 2 % shampoo   11     atenolol (TENORMIN) 50 MG tablet Take 1 tablet (50 mg) by mouth daily 90 tablet 3     IBUPROFEN PO Take 600 mg by mouth every 4 hours as needed for moderate pain       Calcium Carbonate-Vitamin D (CALCIUM + D PO) Take 1 tablet by mouth daily.          No Known Allergies      Review of Systems:  -As per HPI  -Constitutional: The patient denies fatigue, fevers, chills, unintended weight loss, and night sweats.  -HEENT: Patient denies nonhealing oral sores.    Physical exam:  There were no vitals taken for this visit.  -This is a well developed, well-nourished female in no acute distress, in a pleasant mood.    -Total skin excluding the undergarment areas was performed. The exam included the head/face, neck, both arms, back, both legs, digits and/or nails.   -There is a firm tan/flesh colored papule that dimples with lateral pressure on the right knee.  - Keratotic skin colored papules, located on the extensor surfaces of both elbows.  -No other lesions of concern on areas examined.     Impression/Plan:  1. Dermatofibroma: Discussed benign nature with patient and that nothing needs to be done at this time.      2. Hyperkeratosis of the elbows: these areas are not painful or bothersome. Could be related to pressure or rubbing of her elbows.    Apply Urea  cream 40% to affected areas twice daily until resolved        Follow-up prn for new or changing lesions.      Staff Involved:  I, Sonny Weiss MS4, served as a scribe for Dr. Jacek Sheehan

## 2017-10-06 NOTE — PROGRESS NOTES
[facility] Dermatology Note      Dermatology Problem List:  Dermatofibroma    Encounter Date: Oct 6, 2017    CC:   Chief Complaint   Patient presents with     Skin Check     Romana is here today for a skin check. No concerns         History of Present Illness:  This 47 year old female presents in self referral for rash on her elbows. Patient states that in July she had hot tub folliculitis on her neck and was treated with oral and topical clindamycin.  She first noticed a few small bumps on her elbows at that time that did not resemble her other rash.  Her rash resolved and the elbows did not bother her.  Within the last week she has noticed more of the bumps on her elbows. They are not painful or itchy.  She has put the topical clindamycin on her elbows and that has not helped.  She also notes one bump on her right knee. It has been there for several years and has gotten a little bigger and darker over the last few months.  It is not painful. There are no other concerning lesions.    Past Medical History:   Past Medical History:   Diagnosis Date     Anemia     PREGNANCY     Herpes simplex without mention of complication      HSV      Hypertension     Gestational     PONV (postoperative nausea and vomiting)     POST C SECTION     Pre-eclampsia      Past Surgical History:   Procedure Laterality Date     C BREAST PROSTHESIS, NOS  2004      SECTION       HERNIORRHAPHY VENTRAL  10/26/2012    Procedure: HERNIORRHAPHY VENTRAL;  Open Ventral Hernia Repair  ;  Surgeon: Shaun Clinton MD;  Location: [facility] OR     LAPAROSCOPY DIAGNOSTIC (GYN) Bilateral 2016    Procedure: LAPAROSCOPY DIAGNOSTIC (GYN);  Surgeon: Ventura Flower MD;  Location: [facility]     LAPAROTOMY MINI, TUBAL LIGATION (POST PARTUM), COMBINED  2012    Procedure:COMBINED LAPAROTOMY MINI, TUBAL LIGATION (POST PARTUM); Surgeon:RAVEN YARBROUGH; Location:[facility] L[facility]D     TUBOPLASTY REANASTOMOSIS Bilateral 2016    Procedure:  TUBOPLASTY REANASTOMOSIS;  Surgeon: Ventura Flower MD;  Location: [facility]       Social History:  The patient works in a  . The patient admits to use of tanning beds years ago.    Family History:  There is no family history of skin cancer.    Medications:  Current Outpatient Prescriptions   Medication Sig Dispense Refill     ketoconazole (NIZORAL) 2 % shampoo   11     atenolol (TENORMIN) 50 MG tablet Take 1 tablet (50 mg) by mouth daily 90 tablet 3     IBUPROFEN PO Take 600 mg by mouth every 4 hours as needed for moderate pain       Calcium Carbonate-Vitamin D (CALCIUM + D PO) Take 1 tablet by mouth daily.          No Known Allergies      Review of Systems:  -As per HPI  -Constitutional: The patient denies fatigue, fevers, chills, unintended weight loss, and night sweats.  -HEENT: Patient denies nonhealing oral sores.    Physical exam:  There were no vitals taken for this visit.  -This is a well developed, well-nourished female in no acute distress, in a pleasant mood.    -Total skin excluding the undergarment areas was performed. The exam included the head/face, neck, both arms, back, both legs, digits and/or nails.   -There is a firm tan/flesh colored papule that dimples with lateral pressure on the right knee.  - Keratotic skin colored papules, located on the extensor surfaces of both elbows.  -No other lesions of concern on areas examined.     Impression/Plan:  1. Dermatofibroma: Discussed benign nature with patient and that nothing needs to be done at this time.      2. Hyperkeratosis of the elbows: these areas are not painful or bothersome. Could be related to pressure or rubbing of her elbows.    Apply Urea cream 40% to affected areas twice daily until resolved        Follow-up prn for new or changing lesions.      Staff Involved:  I, Sonny Weiss MS4, served as a scribe for Dr. Jacek Sheehan

## 2017-10-06 NOTE — MR AVS SNAPSHOT
After Visit Summary   10/6/2017    Edwina Wynne    MRN: [id number]           Patient Information     Date Of Birth          1970        Visit Information        Provider Department      10/6/2017 10:45 AM ALEXANDRIA Sheehan MD [facility] Dermatology        Today's Diagnoses     Other acne    -  1      Care Instructions    The lesions on your elbows appear to represent areas of thickening that we call hyperkeratosis. They might be related to friction from resting your elbows. They appear very harmless. Would suggest use of 40% urea cream that you can purchase from [employer]. Apply twice a day until they are flattened or gone. Should they become larger and spread, let us take another look.     The lesion on your knee is a typical dermatofibroma, a common and harmless type of growth. No biopsy needed.           Follow-ups after your visit        Your next 10 appointments already scheduled     Oct 10, 2017  9:40 AM CDT   Return Visit with Blanca Heard MD   [facility]  ([facility])    [address]  [address] [place] [id number] [phone]              Who to contact     Please call your clinic at [phone] to:    Ask questions about your health    Make or cancel appointments    Discuss your medicines    Learn about your test results    Speak to your doctor   If you have compliments or concerns about an experience at your clinic, or if you wish to file a complaint, please contact [facility] Phys[facility] Patient Relations at [phone] or email us at [email or web address]         Additional Information About Your Visit        MyChart Information     [employer] gives you secure access to your electronic health record. If you see a primary care provider, you can also send messages to your care team and make appointments. If you have questions, please call your primary care  clinic.  If you do not have a primary care provider, please call [phone] and they will assist you.      [employer] is an electronic gateway that provides easy, online access to your medical records. With [employer], you can request a clinic appointment, read your test results, renew a prescription or communicate with your care team.     To access your existing account, please contact your [facility] or call [phone] for assistance.        Care EveryWhere ID     This is your Care EveryWhere ID. This could be used by other organizations to access your [place] medical records  [id number]         Blood Pressure from Last 3 Encounters:   08/10/17 130/83   07/12/17 120/78   10/13/16 129/78    Weight from Last 3 Encounters:   08/10/17 77.4 kg (170 lb 11.2 oz)   07/12/17 78.5 kg (173 lb)   10/13/16 71.2 kg (157 lb)              Today, you had the following     No orders found for display         Today's Medication Changes          These changes are accurate as of: 10/6/17 11:22 AM.  If you have any questions, ask your nurse or doctor.               Start taking these medicines.        Dose/Directions    tretinoin 0.025 % cream   Commonly known as:  RETIN-A   Used for:  Other acne   Started by:  ALEXANDRIA Sheehan MD        Use every night   Quantity:  45 g   Refills:  6            Where to get your medicines      Call your pharmacy to confirm that your medication is ready for pickup. It may take up to 24 hours for them to receive the prescription. If the prescription is not ready within 3 business days, please contact your clinic or your provider.     We will let you know when these medications are ready. If you don't hear back within 3 business days, please contact us.     tretinoin 0.025 % cream                Primary Care Provider Office Phone # Fax #    Blanca Heard [phone]       [facility] [address] [facility] [address]        Equal  Access to Services     [facility]: Hadii aad ku hadolelynette Denaeali, waesauda luqgisell, qadaija kacherellepadmini castaneda. So [facility] [phone].    ATENCIÓN: Si habla español, tiene a knight disposición servicios gratuitos de asistencia lingüística. Llame al [phone].    We comply with applicable federal civil rights laws and [place] laws. We do not discriminate on the basis of race, color, national origin, age, disability, sex, sexual orientation, or gender identity.            Thank you!     Thank you for choosing [facility] DERMATOLOGY  for your care. Our goal is always to provide you with excellent care. Hearing back from our patients is one way we can continue to improve our services. Please take a few minutes to complete the written survey that you may receive in the mail after your visit with us. Thank you!             Your Updated Medication List - Protect others around you: Learn how to safely use, store and throw away your medicines at www.disposemymeds.org.          This list is accurate as of: 10/6/17 11:22 AM.  Always use your most recent med list.                   Brand Name Dispense Instructions for use Diagnosis    atenolol 50 MG tablet    TENORMIN    90 tablet    Take 1 tablet (50 mg) by mouth daily    Essential hypertension       CALCIUM + D PO      Take 1 tablet by mouth daily.        IBUPROFEN PO      Take 600 mg by mouth every 4 hours as needed for moderate pain        ketoconazole 2 % shampoo    NIZORAL          tretinoin 0.025 % cream    RETIN-A    45 g    Use every night    Other acne

## 2017-10-06 NOTE — PATIENT INSTRUCTIONS
The lesions on your elbows appear to represent areas of thickening that we call hyperkeratosis. They might be related to friction from resting your elbows. They appear very harmless. Would suggest use of 40% urea cream that you can purchase from [employer]. Apply twice a day until they are flattened or gone. Should they become larger and spread, let us take another look.     The lesion on your knee is a typical dermatofibroma, a common and harmless type of growth. No biopsy needed.

## 2017-10-06 NOTE — NURSING NOTE
Dermatology Rooming Note    Edwina Wynne's goals for this visit include:   Chief Complaint   Patient presents with     Skin Check     Romana is here today for a skin check. No concerns     JEN Cross

## 2017-10-09 ENCOUNTER — TELEPHONE (OUTPATIENT)
Dept: DERMATOLOGY | Facility: CLINIC | Age: 47
End: 2017-10-09

## 2017-10-10 ENCOUNTER — OFFICE VISIT (OUTPATIENT)
Dept: INTERNAL MEDICINE | Facility: CLINIC | Age: 47
End: 2017-10-10
Attending: INTERNAL MEDICINE
Payer: COMMERCIAL

## 2017-10-10 VITALS
BODY MASS INDEX: 26.31 KG/M2 | DIASTOLIC BLOOD PRESSURE: 80 MMHG | HEART RATE: 75 BPM | SYSTOLIC BLOOD PRESSURE: 115 MMHG | WEIGHT: 163 LBS

## 2017-10-10 DIAGNOSIS — Z00.00 ROUTINE GENERAL MEDICAL EXAMINATION AT A HEALTH CARE FACILITY: ICD-10-CM

## 2017-10-10 DIAGNOSIS — I10 BENIGN ESSENTIAL HYPERTENSION: ICD-10-CM

## 2017-10-10 DIAGNOSIS — Z23 NEED FOR IMMUNIZATION AGAINST INFLUENZA: Primary | ICD-10-CM

## 2017-10-10 LAB
ANION GAP SERPL CALCULATED.3IONS-SCNC: 6 MMOL/L (ref 3–14)
BUN SERPL-MCNC: 15 MG/DL (ref 7–30)
CALCIUM SERPL-MCNC: 9.3 MG/DL (ref 8.5–10.1)
CHLORIDE SERPL-SCNC: 100 MMOL/L (ref 94–109)
CHOLEST SERPL-MCNC: 240 MG/DL
CO2 SERPL-SCNC: 29 MMOL/L (ref 20–32)
CREAT SERPL-MCNC: 0.61 MG/DL (ref 0.52–1.04)
GFR SERPL CREATININE-BSD FRML MDRD: >90 ML/MIN/1.7M2
GLUCOSE SERPL-MCNC: 85 MG/DL (ref 70–99)
HDLC SERPL-MCNC: 66 MG/DL
LDLC SERPL CALC-MCNC: 156 MG/DL
NONHDLC SERPL-MCNC: 174 MG/DL
POTASSIUM SERPL-SCNC: 3.9 MMOL/L (ref 3.4–5.3)
SODIUM SERPL-SCNC: 135 MMOL/L (ref 133–144)
TRIGL SERPL-MCNC: 91 MG/DL

## 2017-10-10 PROCEDURE — 90686 IIV4 VACC NO PRSV 0.5 ML IM: CPT | Mod: ZF

## 2017-10-10 PROCEDURE — G0008 ADMIN INFLUENZA VIRUS VAC: HCPCS

## 2017-10-10 PROCEDURE — 25000128 H RX IP 250 OP 636: Mod: ZF

## 2017-10-10 PROCEDURE — 36415 COLL VENOUS BLD VENIPUNCTURE: CPT | Performed by: INTERNAL MEDICINE

## 2017-10-10 PROCEDURE — 80061 LIPID PANEL: CPT | Performed by: INTERNAL MEDICINE

## 2017-10-10 PROCEDURE — 80048 BASIC METABOLIC PNL TOTAL CA: CPT | Performed by: INTERNAL MEDICINE

## 2017-10-10 PROCEDURE — 99212 OFFICE O/P EST SF 10 MIN: CPT | Mod: ZF

## 2017-10-10 RX ORDER — CLOBETASOL PROPIONATE 0.5 MG/ML
SOLUTION TOPICAL
Refills: 2 | COMMUNITY
Start: 2017-08-21 | End: 2021-02-12

## 2017-10-10 ASSESSMENT — PAIN SCALES - GENERAL: PAINLEVEL: NO PAIN (0)

## 2017-10-10 NOTE — LETTER
Date:October 16, 2017      Patient was self referred, no letter generated. Do not send.        [facility] Physicians Health Information

## 2017-10-10 NOTE — MR AVS SNAPSHOT
After Visit Summary   10/10/2017    Edwina Wynne    MRN: [id number]           Patient Information     Date Of Birth          1970        Visit Information        Provider Department      10/10/2017 9:40 AM Blanca Heard MD [facility]         Today's Diagnoses     Need for immunization against influenza    -  1    Benign essential hypertension        Routine general medical examination at a health care facility           Follow-ups after your visit        Who to contact     Please call your clinic at [phone] to:    Ask questions about your health    Make or cancel appointments    Discuss your medicines    Learn about your test results    Speak to your doctor   If you have compliments or concerns about an experience at your clinic, or if you wish to file a complaint, please contact [facility] Physicians Patient Relations at [phone] or email us at [email or web address]         Additional Information About Your Visit        MyChart Information     [employer] gives you secure access to your electronic health record. If you see a primary care provider, you can also send messages to your care team and make appointments. If you have questions, please call your primary care clinic.  If you do not have a primary care provider, please call [phone] and they will assist you.      [employer] is an electronic gateway that provides easy, online access to your medical records. With [employer], you can request a clinic appointment, read your test results, renew a prescription or communicate with your care team.     To access your existing account, please contact your [facility] or call [phone] for assistance.        Care EveryWhere ID     This is your Care EveryWhere ID. This could be used by other organizations to access your [place] medical records  [id number]        Your Vitals Were     Pulse Last  Period Breastfeeding? BMI (Body Mass Index)          75 09/25/2017 No 26.31 kg/m2         Blood Pressure from Last 3 Encounters:   10/10/17 115/80   08/10/17 130/83   07/12/17 120/78    Weight from Last 3 Encounters:   10/10/17 73.9 kg (163 lb)   08/10/17 77.4 kg (170 lb 11.2 oz)   07/12/17 78.5 kg (173 lb)              We Performed the Following     Basic Metabolic Panel     HC FLU VAC PRESRV FREE QUAD SPLIT VIR 3+YRS IM     Lipid Profile          Today's Medication Changes          These changes are accurate as of: 10/10/17 11:59 PM.  If you have any questions, ask your nurse or doctor.               Stop taking these medicines if you haven't already. Please contact your care team if you have questions.     atenolol 50 MG tablet   Commonly known as:  TENORMIN   Stopped by:  Blanca Heard MD                    Primary Care Provider Office Phone # Fax #    Blanca Heard [phone]       [facility] [address] [place] [address]        Equal Access to Services     [facility]: Hadii jeanine Rodriguez, waesauda ning, qaybta shanell diaz, padmini tejada . So [facility] [phone].    ATENCIÓN: Si habla español, tiene a knight disposición servicios gratuitos de asistencia lingüística. Oc al [phone].    We comply with applicable federal civil rights laws and [place] laws. We do not discriminate on the basis of race, color, national origin, age, disability, sex, sexual orientation, or gender identity.            Thank you!     Thank you for choosing [facility]   for your care. Our goal is always to provide you with excellent care. Hearing back from our patients is one way we can continue to improve our services. Please take a few minutes to complete the written survey that you may receive in the mail after your visit with us. Thank you!             Your Updated Medication List - Protect others around  you: Learn how to safely use, store and throw away your medicines at www.disposemymeds.org.          This list is accurate as of: 10/10/17 11:59 PM.  Always use your most recent med list.                   Brand Name Dispense Instructions for use Diagnosis    CALCIUM + D PO      Take 1 tablet by mouth daily.        clobetasol 0.05 % external solution    TEMOVATE          IBUPROFEN PO      Take 600 mg by mouth every 4 hours as needed for moderate pain        ketoconazole 2 % shampoo    NIZORAL          tretinoin 0.025 % cream    RETIN-A    45 g    Use every night    Other acne

## 2017-10-10 NOTE — PROGRESS NOTES
HPI  Patient is here for follow up on hypertension. She reports that she has been experiencing occasional dizziness. The episodes were not predictable, however, her episodes were occurring more frequently when she was taking her medication on the regular basis. She states that she has stopped atenolol 1 month ago. She has not had a single episode of dizziness since that time.     Review of Systems     Constitutional:  Negative for fever, chills and fatigue.   Eyes:  Negative for decreased vision and tunnel vision.   Respiratory:   Negative for cough and dyspnea on exertion.    Cardiovascular:  Negative for chest pain, dyspnea on exertion, claudication and edema.   Gastrointestinal:  Negative for abdominal pain and constipation.   Neurological:  Negative for loss of consciousness and headaches.   Endocrine:  Negative for altered temperature regulation, polyphagia, polydipsia, unwanted hair growth and change in facial hair.    Current Outpatient Prescriptions   Medication     clobetasol (TEMOVATE) 0.05 % external solution     tretinoin (RETIN-A) 0.025 % cream     ketoconazole (NIZORAL) 2 % shampoo     IBUPROFEN PO     Calcium Carbonate-Vitamin D (CALCIUM + D PO)     No current facility-administered medications for this visit.      Vitals:    10/10/17 0954 10/10/17 0955 10/10/17 0956   BP: 130/85 131/83 115/80   Pulse: 92 76 75   Weight: 73.9 kg (163 lb)         Physical Exam   Constitutional: She is oriented to person, place, and time and well-developed, well-nourished, and in no distress.   HENT:   Head: Normocephalic and atraumatic.   Eyes: Conjunctivae are normal. Pupils are equal, round, and reactive to light.   Cardiovascular: Normal rate.    Pulmonary/Chest: Effort normal.   Musculoskeletal: She exhibits no edema.   Neurological: She is alert and oriented to person, place, and time.   Skin: Skin is warm and dry.   Psychiatric: Mood, memory, affect and judgment normal.   Vitals reviewed.      Assessment nad  Plan:  Edwina was seen today for recheck.    Diagnoses and all orders for this visit:    Need for immunization against influenza  -     HC FLU VAC PRESRV FREE QUAD SPLIT VIR 3+YRS IM    Benign essential hypertension. Blood pressure is within acceptable range. Recommend holding off on the medications at this time. Patient is in agreement with the plan.   -     Lipid Profile  -     Basic Metabolic Panel      Total time spent 15 minutes.  More than 50% of the time spent with Ms. Wynne on counseling / coordinating her care    Blanca Heard MD

## 2017-10-10 NOTE — LETTER
10/10/2017       RE: Edwina Wynne  [address]  [facility] [address]     Dear Colleague,    Thank you for referring your patient, Edwina Wynne, to the [facility]  at [facility]. Please see a copy of my visit note below.    HPI  Patient is here for follow up on hypertension. She reports that she has been experiencing occasional dizziness. The episodes were not predictable, however, her episodes were occurring more frequently when she was taking her medication on the regular basis. She states that she has stopped atenolol 1 month ago. She has not had a single episode of dizziness since that time.     Review of Systems     Constitutional:  Negative for fever, chills and fatigue.   Eyes:  Negative for decreased vision and tunnel vision.   Respiratory:   Negative for cough and dyspnea on exertion.    Cardiovascular:  Negative for chest pain, dyspnea on exertion, claudication and edema.   Gastrointestinal:  Negative for abdominal pain and constipation.   Neurological:  Negative for loss of consciousness and headaches.   Endocrine:  Negative for altered temperature regulation, polyphagia, polydipsia, unwanted hair growth and change in facial hair.    Current Outpatient Prescriptions   Medication     clobetasol (TEMOVATE) 0.05 % external solution     tretinoin (RETIN-A) 0.025 % cream     ketoconazole (NIZORAL) 2 % shampoo     IBUPROFEN PO     Calcium Carbonate-Vitamin D (CALCIUM + D PO)     No current facility-administered medications for this visit.      Vitals:    10/10/17 0954 10/10/17 0955 10/10/17 0956   BP: 130/85 131/83 115/80   Pulse: 92 76 75   Weight: 73.9 kg (163 lb)         Physical Exam   Constitutional: She is oriented to person, place, and time and well-developed, well-nourished, and in no distress.   HENT:   Head: Normocephalic and atraumatic.   Eyes: Conjunctivae are normal. Pupils are equal, round, and  reactive to light.   Cardiovascular: Normal rate.    Pulmonary/Chest: Effort normal.   Musculoskeletal: She exhibits no edema.   Neurological: She is alert and oriented to person, place, and time.   Skin: Skin is warm and dry.   Psychiatric: Mood, memory, affect and judgment normal.   Vitals reviewed.      Assessment nad Plan:  Edwina was seen today for recheck.    Diagnoses and all orders for this visit:    Need for immunization against influenza  -     HC FLU VAC PRESRV FREE QUAD SPLIT VIR 3+YRS IM    Benign essential hypertension. Blood pressure is within acceptable range. Recommend holding off on the medications at this time. Patient is in agreement with the plan.   -     Lipid Profile  -     Basic Metabolic Panel      Total time spent 15 minutes.  More than 50% of the time spent with Ms. Wynne on counseling / coordinating her care    Blanca Heard MD                    Again, thank you for allowing me to participate in the care of your patient.      Sincerely,    Blanca Heard MD

## 2017-10-10 NOTE — LETTER
10/24/2017         Edwina Wynne   [address]  [place] [address]        Dear Ms. Wynne:    Your cholesterol was elevated. Recommend regualr physical activity to address high LDL level. Recheck in a year is advisable.     Results for orders placed or performed in visit on 10/10/17   Lipid Profile   Result Value Ref Range    Cholesterol 240 (H) <200 mg/dL    Triglycerides 91 <150 mg/dL    HDL Cholesterol 66 >49 mg/dL    LDL Cholesterol Calculated 156 (H) <100 mg/dL    Non HDL Cholesterol 174 (H) <130 mg/dL   Basic Metabolic Panel   Result Value Ref Range    Sodium 135 133 - 144 mmol/L    Potassium 3.9 3.4 - 5.3 mmol/L    Chloride 100 94 - 109 mmol/L    Carbon Dioxide 29 20 - 32 mmol/L    Anion Gap 6 3 - 14 mmol/L    Glucose 85 70 - 99 mg/dL    Urea Nitrogen 15 7 - 30 mg/dL    Creatinine 0.61 0.52 - 1.04 mg/dL    GFR Estimate >90 >60 mL/min/1.7m2    GFR Estimate If Black >90 >60 mL/min/1.7m2    Calcium 9.3 8.5 - 10.1 mg/dL         Please note that test explanations are brief and do not reflect all diagnostic uses.  If you have any questions or concerns, please call the clinic at [phone].      Sincerely,      Brandy Jay sent on behalf of  Blanca Heard MD

## 2017-10-15 ASSESSMENT — ENCOUNTER SYMPTOMS
COUGH: 0
POLYDIPSIA: 0
ALTERED TEMPERATURE REGULATION: 0
HEADACHES: 0
LOSS OF CONSCIOUSNESS: 0
FATIGUE: 0
ABDOMINAL PAIN: 0
CLAUDICATION: 0
POLYPHAGIA: 0
FEVER: 0
DYSPNEA ON EXERTION: 0
CONSTIPATION: 0
CHILLS: 0

## 2018-02-06 ENCOUNTER — OFFICE VISIT (OUTPATIENT)
Dept: OPTOMETRY | Facility: CLINIC | Age: 48
End: 2018-02-06
Payer: COMMERCIAL

## 2018-02-06 ENCOUNTER — OFFICE VISIT (OUTPATIENT)
Dept: INTERNAL MEDICINE | Facility: CLINIC | Age: 48
End: 2018-02-06
Attending: INTERNAL MEDICINE
Payer: COMMERCIAL

## 2018-02-06 ENCOUNTER — APPOINTMENT (OUTPATIENT)
Dept: OPTOMETRY | Facility: CLINIC | Age: 48
End: 2018-02-06

## 2018-02-06 ENCOUNTER — APPOINTMENT (OUTPATIENT)
Dept: LAB | Facility: CLINIC | Age: 48
End: 2018-02-06
Attending: INTERNAL MEDICINE
Payer: COMMERCIAL

## 2018-02-06 VITALS
SYSTOLIC BLOOD PRESSURE: 119 MMHG | HEIGHT: 66 IN | HEART RATE: 80 BPM | WEIGHT: 151.7 LBS | DIASTOLIC BLOOD PRESSURE: 76 MMHG | BODY MASS INDEX: 24.38 KG/M2

## 2018-02-06 DIAGNOSIS — H52.03 HYPEROPIA OF BOTH EYES WITH ASTIGMATISM AND PRESBYOPIA: Primary | ICD-10-CM

## 2018-02-06 DIAGNOSIS — E78.00 PURE HYPERCHOLESTEROLEMIA: Primary | ICD-10-CM

## 2018-02-06 DIAGNOSIS — H52.4 HYPEROPIA OF BOTH EYES WITH ASTIGMATISM AND PRESBYOPIA: Primary | ICD-10-CM

## 2018-02-06 DIAGNOSIS — H52.203 HYPEROPIA OF BOTH EYES WITH ASTIGMATISM AND PRESBYOPIA: Primary | ICD-10-CM

## 2018-02-06 LAB
CHOLEST SERPL-MCNC: 191 MG/DL
HDLC SERPL-MCNC: 60 MG/DL
LDLC SERPL CALC-MCNC: 116 MG/DL
NONHDLC SERPL-MCNC: 131 MG/DL
TRIGL SERPL-MCNC: 75 MG/DL

## 2018-02-06 PROCEDURE — G0463 HOSPITAL OUTPT CLINIC VISIT: HCPCS

## 2018-02-06 PROCEDURE — 36415 COLL VENOUS BLD VENIPUNCTURE: CPT | Performed by: INTERNAL MEDICINE

## 2018-02-06 PROCEDURE — 80061 LIPID PANEL: CPT | Performed by: INTERNAL MEDICINE

## 2018-02-06 ASSESSMENT — REFRACTION_MANIFEST
OS_AXIS: 005
OD_CYLINDER: +0.75
OD_AXIS: 155
OS_SPHERE: +1.25
OD_SPHERE: +1.25
OS_CYLINDER: +0.75

## 2018-02-06 ASSESSMENT — REFRACTION_CURRENTRX
OD_BRAND: BIOFINITY
OD_BRAND: BIOFINITY
OD_SPHERE: +1.75
OS_SPHERE: +2.00
OS_BRAND: BIOFINITY MULTIFOCAL
OS_SPHERE: +2.00
OD_BASECURVE: 8.6
OD_SPHERE: +2.25
OS_ADD: +1.50D
OD_DIAMETER: 14.0
OD_BRAND: BIOFINITY ENERGYS
OS_BASECURVE: 8.6
OS_DIAMETER: 14.0
OS_DIAMETER: 14.0
OD_DIAMETER: 14.0
OD_SPHERE: +1.75
OS_DIAMETER: 14.0
OS_BRAND: BIOFINITY ENERGYS
OD_BASECURVE: 8.6
OS_BRAND: BIOFINITY
OS_SPHERE: +1.75
OD_DIAMETER: 14.0
OD_BASECURVE: 8.6
OS_BASECURVE: 8.6
OS_BASECURVE: 8.6

## 2018-02-06 ASSESSMENT — CONF VISUAL FIELD
OD_NORMAL: 1
OS_NORMAL: 1

## 2018-02-06 ASSESSMENT — REFRACTION_WEARINGRX
OD_SPHERE: +1.50
OS_AXIS: 180
OD_AXIS: 160
OD_CYLINDER: +0.50
OS_SPHERE: +1.00
OS_CYLINDER: +0.75

## 2018-02-06 ASSESSMENT — SLIT LAMP EXAM - LIDS
COMMENTS: NORMAL
COMMENTS: NORMAL

## 2018-02-06 ASSESSMENT — TONOMETRY
OS_IOP_MMHG: 13
OD_IOP_MMHG: 15
IOP_METHOD: ICARE

## 2018-02-06 ASSESSMENT — VISUAL ACUITY
METHOD: SNELLEN - LINEAR
CORRECTION_TYPE: CONTACTS
OS_CC: 20/30
OD_CC: 20/40

## 2018-02-06 ASSESSMENT — EXTERNAL EXAM - LEFT EYE: OS_EXAM: NORMAL

## 2018-02-06 ASSESSMENT — EXTERNAL EXAM - RIGHT EYE: OD_EXAM: NORMAL

## 2018-02-06 ASSESSMENT — CUP TO DISC RATIO
OD_RATIO: 0.25
OS_RATIO: 0.25

## 2018-02-06 NOTE — PROGRESS NOTES
A/P  1.) Hyperopia/Astig/Presbyopia OU  -Poor adaptation to monovision/multifocal, even modified monovision  -Refit to full distance OU in CL's. Would recommend trial of [employer] for mild near improvement  -Reviewed narrow angle/hyperopia/natural progression with pt. Monitor  -CLRx updated, okay to order if working well. Readers over as necessary.    Monitor 1-2 years routine, sooner prn

## 2018-02-06 NOTE — LETTER
"2/6/2018       RE: Edwina Wynne  [address]  [facility] [address]     Dear Colleague,    Thank you for referring your patient, Edwina Wynne, to the [facility]  at [facility]. Please see a copy of my visit note below.    HPI  Patient is here for follow up on elevated blood pressure. She reports that she has been more physically active recently. She denies new problems. She is wondering if she should also check her cholesterol level today.     Review of Systems     Constitutional:  Negative for fever, chills and fatigue.   Respiratory:   Negative for cough and dyspnea on exertion.    Cardiovascular:  Negative for chest pain and dyspnea on exertion.   Gastrointestinal:  Negative for abdominal pain, diarrhea and constipation.   Skin:  Negative for itching and rash.   Neurological:  Negative for headaches.   Psychiatric/Behavioral:  Negative for depression, decreased concentration, mood swings and panic attacks.    Endocrine:  Negative for altered temperature regulation, polyphagia, polydipsia, unwanted hair growth and change in facial hair.    Current Outpatient Prescriptions   Medication     clobetasol (TEMOVATE) 0.05 % external solution     tretinoin (RETIN-A) 0.025 % cream     ketoconazole (NIZORAL) 2 % shampoo     IBUPROFEN PO     Calcium Carbonate-Vitamin D (CALCIUM + D PO)     No current facility-administered medications for this visit.      Vitals:    02/06/18 0856   BP: 119/76   Pulse: 80   Weight: 68.8 kg (151 lb 11.2 oz)   Height: 1.676 m (5' 6\")         Physical Exam   Constitutional: She is oriented to person, place, and time and well-developed, well-nourished, and in no distress.   HENT:   Head: Normocephalic and atraumatic.   Eyes: Conjunctivae are normal. Pupils are equal, round, and reactive to light.   Cardiovascular: Normal rate.    Pulmonary/Chest: Effort normal.   Neurological: She is alert and oriented " to person, place, and time.   Skin: Skin is warm and dry.   Psychiatric: Mood, memory, affect and judgment normal.   Vitals reviewed.      Assessment and Plan:  Edwina was seen today for recheck.    Diagnoses and all orders for this visit:    Pure hypercholesterolemia. Patient was advised that her blood pressure is within normal range. Encouraged to continue with current lifestyle modifications. Will check lipid panel today. Patient will be advised on test results accordingly.   -     Lipid Profile      Total time spent 15 minutes.  More than 50% of the time spent with Ms. Wynne on counseling / coordinating her care    Again, thank you for allowing me to participate in the care of your patient.      Sincerely,    Blanca Heard MD

## 2018-02-06 NOTE — MR AVS SNAPSHOT
"              After Visit Summary   2/6/2018    Edwina Wynne    MRN: [id number]           Patient Information     Date Of Birth          1970        Visit Information        Provider Department      2/6/2018 9:00 AM Blanca Heard MD [facility]         Today's Diagnoses     Pure hypercholesterolemia    -  1       Follow-ups after your visit        Who to contact     Please call your clinic at [phone] to:    Ask questions about your health    Make or cancel appointments    Discuss your medicines    Learn about your test results    Speak to your doctor   If you have compliments or concerns about an experience at your clinic, or if you wish to file a complaint, please contact [facility] Patient Relations at [phone] or email us at [email or web address]         Additional Information About Your Visit        MyChart Information     [employer] gives you secure access to your electronic health record. If you see a primary care provider, you can also send messages to your care team and make appointments. If you have questions, please call your primary care clinic.  If you do not have a primary care provider, please call [phone] and they will assist you.      [employer] is an electronic gateway that provides easy, online access to your medical records. With [employer], you can request a clinic appointment, read your test results, renew a prescription or communicate with your care team.     To access your existing account, please contact your [facility] or call [phone] for assistance.        Care EveryWhere ID     This is your Care EveryWhere ID. This could be used by other organizations to access your [facility] medical records  [id number]        Your Vitals Were     Pulse Height Last Period BMI (Body Mass Index)          80 1.676 m (5' 6\") 01/06/2018 (Approximate) 24.49 kg/m2         Blood Pressure " from Last 3 Encounters:   02/06/18 119/76   10/10/17 115/80   08/10/17 130/83    Weight from Last 3 Encounters:   02/06/18 68.8 kg (151 lb 11.2 oz)   10/10/17 73.9 kg (163 lb)   08/10/17 77.4 kg (170 lb 11.2 oz)              We Performed the Following     Lipid Profile        Primary Care Provider Office Phone # Fax #    Blanca Jennifer Heard [phone]       [facility] [address] [facility] [address]        Equal Access to Services     [facility]: Hadii jeanine salinas hadashlynette Sodaniel, waaxda luqadaha, qaybta kaalmada emily, padmini tejada . So [facility] [phone].    ATENCIÓN: Si habla español, tiene a knight disposición servicios gratuitos de asistencia lingüística. [facility] [phone].    We comply with applicable federal civil rights laws and [place] laws. We do not discriminate on the basis of race, color, national origin, age, disability, sex, sexual orientation, or gender identity.            Thank you!     Thank you for choosing [facility]   for your care. Our goal is always to provide you with excellent care. Hearing back from our patients is one way we can continue to improve our services. Please take a few minutes to complete the written survey that you may receive in the mail after your visit with us. Thank you!             Your Updated Medication List - Protect others around you: Learn how to safely use, store and throw away your medicines at www.disposemymeds.org.          This list is accurate as of 2/6/18 11:59 PM.  Always use your most recent med list.                   Brand Name Dispense Instructions for use Diagnosis    CALCIUM + D PO      Take 1 tablet by mouth daily.        clobetasol 0.05 % external solution    TEMOVATE          IBUPROFEN PO      Take 600 mg by mouth every 4 hours as needed for moderate pain        ketoconazole 2 % shampoo    NIZORAL          tretinoin 0.025 % cream    RETIN-A    45 g    Use  every night    Other acne

## 2018-02-06 NOTE — PROGRESS NOTES
"HPI  Patient is here for follow up on elevated blood pressure. She reports that she has been more physically active recently. She denies new problems. She is wondering if she should also check her cholesterol level today.     Review of Systems     Constitutional:  Negative for fever, chills and fatigue.   Respiratory:   Negative for cough and dyspnea on exertion.    Cardiovascular:  Negative for chest pain and dyspnea on exertion.   Gastrointestinal:  Negative for abdominal pain, diarrhea and constipation.   Skin:  Negative for itching and rash.   Neurological:  Negative for headaches.   Psychiatric/Behavioral:  Negative for depression, decreased concentration, mood swings and panic attacks.    Endocrine:  Negative for altered temperature regulation, polyphagia, polydipsia, unwanted hair growth and change in facial hair.    Current Outpatient Prescriptions   Medication     clobetasol (TEMOVATE) 0.05 % external solution     tretinoin (RETIN-A) 0.025 % cream     ketoconazole (NIZORAL) 2 % shampoo     IBUPROFEN PO     Calcium Carbonate-Vitamin D (CALCIUM + D PO)     No current facility-administered medications for this visit.      Vitals:    02/06/18 0856   BP: 119/76   Pulse: 80   Weight: 68.8 kg (151 lb 11.2 oz)   Height: 1.676 m (5' 6\")         Physical Exam   Constitutional: She is oriented to person, place, and time and well-developed, well-nourished, and in no distress.   HENT:   Head: Normocephalic and atraumatic.   Eyes: Conjunctivae are normal. Pupils are equal, round, and reactive to light.   Cardiovascular: Normal rate.    Pulmonary/Chest: Effort normal.   Neurological: She is alert and oriented to person, place, and time.   Skin: Skin is warm and dry.   Psychiatric: Mood, memory, affect and judgment normal.   Vitals reviewed.      Assessment and Plan:  Edwina was seen today for recheck.    Diagnoses and all orders for this visit:    Pure hypercholesterolemia. Patient was advised that her blood pressure is " within normal range. Encouraged to continue with current lifestyle modifications. Will check lipid panel today. Patient will be advised on test results accordingly.   -     Lipid Profile      Total time spent 15 minutes.  More than 50% of the time spent with Ms. Wynne on counseling / coordinating her care    Blanca Heard MD

## 2018-02-06 NOTE — LETTER
2/9/2018         Edwina Ricci   [address]  [facility] [address]        Dear Ms. Wynne:    Your cholesterol has improved significantly. Keep up the good work!     Results for orders placed or performed in visit on 02/06/18   Lipid Profile   Result Value Ref Range    Cholesterol 191 <200 mg/dL    Triglycerides 75 <150 mg/dL    HDL Cholesterol 60 >49 mg/dL    LDL Cholesterol Calculated 116 (H) <100 mg/dL    Non HDL Cholesterol 131 (H) <130 mg/dL         Please note that test explanations are brief and do not reflect all diagnostic uses.  If you have any questions or concerns, please call the clinic at [phone].      Sincerely,      Brandy Jay sent on behalf of  Blanca Heard MD

## 2018-02-06 NOTE — MR AVS SNAPSHOT
After Visit Summary   2/6/2018    Ewdina Wynne    MRN: [id number]           Patient Information     Date Of Birth          1970        Visit Information        Provider Department      2/6/2018 10:30 AM Damari Dunn, [facility]        Today's Diagnoses     Hyperopia of both eyes with astigmatism and presbyopia    -  1       Follow-ups after your visit        Who to contact     Please call your clinic at [phone] to:    Ask questions about your health    Make or cancel appointments    Discuss your medicines    Learn about your test results    Speak to your doctor   If you have compliments or concerns about an experience at your clinic, or if you wish to file a complaint, please contact [facility] Patient Relations at [phone] or email us at [email or web address]         Additional Information About Your Visit        MyChart Information     [employer] gives you secure access to your electronic health record. If you see a primary care provider, you can also send messages to your care team and make appointments. If you have questions, please call your primary care clinic.  If you do not have a primary care provider, please call [phone] and they will assist you.      [employer] is an electronic gateway that provides easy, online access to your medical records. With [employer], you can request a clinic appointment, read your test results, renew a prescription or communicate with your care team.     To access your existing account, please contact your [facility] or call [phone] for assistance.        Care EveryWhere ID     This is your Care EveryWhere ID. This could be used by other organizations to access your [facility] medical records  [id number]         Blood Pressure from Last 3 Encounters:   02/06/18 119/76   10/10/17 115/80   08/10/17 130/83    Weight from Last 3 Encounters:   02/06/18 68.8 kg  (151 lb 11.2 oz)   10/10/17 73.9 kg (163 lb)   08/10/17 77.4 kg (170 lb 11.2 oz)              We Performed the Following     HC CONTACT LENS FITTING COSMETIC LVL 1 ([id number])        Primary Care Provider Office Phone # Fax #    Blanca Jennifer Heard [phone]       [facility] [address]  [facility] [address]        Equal Access to Services     MANISHA NEWTON : Hadii aad ku hadasho Soomaali, waaxda luqadaha, qaybta kaalmada adeegyada, waxay idiin hayaan adeeg yuriaramargot la'melodie . So [facility] [phone].    ATENCIÓN: Si myriamla español, tiene a knight disposición servicios gratuitos de asistencia lingüística. [facility] [phone].    We comply with applicable federal civil rights laws and [place] laws. We do not discriminate on the basis of race, color, national origin, age, disability, sex, sexual orientation, or gender identity.            Thank you!     Thank you for choosing [facility]  for your care. Our goal is always to provide you with excellent care. Hearing back from our patients is one way we can continue to improve our services. Please take a few minutes to complete the written survey that you may receive in the mail after your visit with us. Thank you!             Your Updated Medication List - Protect others around you: Learn how to safely use, store and throw away your medicines at www.disposemymeds.org.          This list is accurate as of 2/6/18 11:15 AM.  Always use your most recent med list.                   Brand Name Dispense Instructions for use Diagnosis    CALCIUM + D PO      Take 1 tablet by mouth daily.        clobetasol 0.05 % external solution    TEMOVATE          IBUPROFEN PO      Take 600 mg by mouth every 4 hours as needed for moderate pain        ketoconazole 2 % shampoo    NIZORAL          tretinoin 0.025 % cream    RETIN-A    45 g    Use every night    Other acne

## 2018-02-07 ASSESSMENT — ENCOUNTER SYMPTOMS
FEVER: 0
POLYDIPSIA: 0
COUGH: 0
DECREASED CONCENTRATION: 0
PANIC: 0
POLYPHAGIA: 0
CHILLS: 0
INSOMNIA: 0
CONSTIPATION: 0
DEPRESSION: 0
ALTERED TEMPERATURE REGULATION: 0
HEADACHES: 0
DIARRHEA: 0
ABDOMINAL PAIN: 0
DYSPNEA ON EXERTION: 0
NERVOUS/ANXIOUS: 0
FATIGUE: 0

## 2018-03-12 ENCOUNTER — TELEPHONE (OUTPATIENT)
Dept: OBGYN | Facility: CLINIC | Age: 48
End: 2018-03-12

## 2018-03-12 NOTE — TELEPHONE ENCOUNTER
Received call from Edwina stating she would like to have a procedure done called 'cool sculpting' which is a vacuum used on abdomen to get rid of fat cells. However, she has a hernia from where she had a tubal ligation, even had it repaired 4-5 years ago but it came back. Since this is present, she needs a doctor note stating its OK to proceed.    Discussed with Dr. Heard who advised office evaluation with the surgeon who performed the hernia surgery.     Gave this information to Edwina who agreed with plan and hung up.

## 2018-04-24 ENCOUNTER — TRANSFERRED RECORDS (OUTPATIENT)
Dept: HEALTH INFORMATION MANAGEMENT | Facility: CLINIC | Age: 48
End: 2018-04-24

## 2018-04-24 LAB
CREAT SERPL-MCNC: 0.62 MG/DL (ref 0.6–1.1)
GFR SERPL CREATININE-BSD FRML MDRD: >60 ML/MIN/1.73M2
GLUCOSE SERPL-MCNC: 111 MG/DL (ref 70–125)
POTASSIUM SERPL-SCNC: 4 MMOL/L (ref 3.5–5)

## 2018-05-02 ENCOUNTER — OFFICE VISIT (OUTPATIENT)
Dept: INTERNAL MEDICINE | Facility: CLINIC | Age: 48
End: 2018-05-02
Attending: INTERNAL MEDICINE
Payer: COMMERCIAL

## 2018-05-02 VITALS
HEART RATE: 77 BPM | SYSTOLIC BLOOD PRESSURE: 122 MMHG | DIASTOLIC BLOOD PRESSURE: 81 MMHG | BODY MASS INDEX: 24.69 KG/M2 | WEIGHT: 153 LBS

## 2018-05-02 DIAGNOSIS — R07.9 ACUTE CHEST PAIN: Primary | ICD-10-CM

## 2018-05-02 PROCEDURE — G0463 HOSPITAL OUTPT CLINIC VISIT: HCPCS | Mod: ZF

## 2018-05-02 ASSESSMENT — PAIN SCALES - GENERAL: PAINLEVEL: NO PAIN (0)

## 2018-05-02 NOTE — LETTER
5/2/2018       RE: Edwina Wynne  [address]  [facility] [address]     Dear Colleague,    Thank you for referring your patient, Edwina Wynne, to the [facility]  at [facility]. Please see a copy of my visit note below.    HPI  Patient is here for follow up on recent visit to ED. She states that she developed chest pain a rest. She was also feeling shortness of breath at the same time. She was seen in the ED, was evaluated for possible heart attack. She states that she has been doing a lot of upper body exercises since her last October. She denies shortness of breath or chest pain.     Review of Systems     Constitutional:  Negative for fever, chills and fatigue.   HENT:  Negative for dry mouth and sinus congestion.    Respiratory:   Negative for cough and dyspnea on exertion.    Cardiovascular:  Positive for chest pain. Negative for dyspnea on exertion and edema.   Gastrointestinal:  Negative for nausea, vomiting, abdominal pain, diarrhea and constipation.   Skin:  Negative for itching and rash.   Neurological:  Negative for memory loss.   Endo/Heme:  Negative for anemia, swollen glands and bruises/bleeds easily.   Psychiatric/Behavioral:  Negative for depression, memory loss, decreased concentration, mood swings and panic attacks.    Breast:  Negative for breast discharge, breast mass, breast pain and nipple retraction.   Endocrine:  Negative for altered temperature regulation, polyphagia, polydipsia, unwanted hair growth and change in facial hair.    Current Outpatient Prescriptions   Medication     Calcium Carbonate-Vitamin D (CALCIUM + D PO)     clobetasol (TEMOVATE) 0.05 % external solution     IBUPROFEN PO     ketoconazole (NIZORAL) 2 % shampoo     tretinoin (RETIN-A) 0.025 % cream     No current facility-administered medications for this visit.      Vitals:    05/02/18 1248 05/02/18 1251 05/02/18 1252   BP: 129/78  116/82 122/81   Pulse: 76 81 77   Weight: 69.4 kg (153 lb)           Physical Exam   Constitutional: She is well-developed, well-nourished, and in no distress.   HENT:   Head: Normocephalic and atraumatic.   Cardiovascular: Normal rate and regular rhythm.    Pulmonary/Chest: Effort normal.   Musculoskeletal: She exhibits no edema.   Skin: Skin is warm and dry.   Psychiatric: Mood, memory, affect and judgment normal.   Vitals reviewed.    Assessment and plan:  Edwina was seen today for recheck.    Diagnoses and all orders for this visit:    Acute chest pain.  Reviewed possible causes of chest pain with patient, including chest wall pain.  She is worried about ischemic heart disease, therefore we will proceed with exercise stress test.  It is however more likely that she had musculoskeletal pain.  She reports that she has recently restarted exercise that includes weight training.  She had a 3 week break prior to resuming her physical activity.  She restarted physical activity shortly before she had an onset of chest pain.  Patient will schedule stress test at her earliest convenience.  She will be contacted with test results.  -     Exercise Stress Echocardiogram; Future    Total time spent 25 minutes.  More than 50% of the time spent with Ms. Wynne on counseling / coordinating her care    Blanca Heard MD

## 2018-05-02 NOTE — PROGRESS NOTES
HPI  Patient is here for follow up on recent visit to ED. She states that she developed chest pain a rest. She was also feeling shortness of breath at the same time. She was seen in the ED, was evaluated for possible heart attack. She states that she has been doing a lot of upper body exercises since her last October. She denies shortness of breath or chest pain.     Review of Systems     Constitutional:  Negative for fever, chills and fatigue.   HENT:  Negative for dry mouth and sinus congestion.    Respiratory:   Negative for cough and dyspnea on exertion.    Cardiovascular:  Positive for chest pain. Negative for dyspnea on exertion and edema.   Gastrointestinal:  Negative for nausea, vomiting, abdominal pain, diarrhea and constipation.   Skin:  Negative for itching and rash.   Neurological:  Negative for memory loss.   Endo/Heme:  Negative for anemia, swollen glands and bruises/bleeds easily.   Psychiatric/Behavioral:  Negative for depression, memory loss, decreased concentration, mood swings and panic attacks.    Breast:  Negative for breast discharge, breast mass, breast pain and nipple retraction.   Endocrine:  Negative for altered temperature regulation, polyphagia, polydipsia, unwanted hair growth and change in facial hair.    Current Outpatient Prescriptions   Medication     Calcium Carbonate-Vitamin D (CALCIUM + D PO)     clobetasol (TEMOVATE) 0.05 % external solution     IBUPROFEN PO     ketoconazole (NIZORAL) 2 % shampoo     tretinoin (RETIN-A) 0.025 % cream     No current facility-administered medications for this visit.      Vitals:    05/02/18 1248 05/02/18 1251 05/02/18 1252   BP: 129/78 116/82 122/81   Pulse: 76 81 77   Weight: 69.4 kg (153 lb)           Physical Exam   Constitutional: She is well-developed, well-nourished, and in no distress.   HENT:   Head: Normocephalic and atraumatic.   Cardiovascular: Normal rate and regular rhythm.    Pulmonary/Chest: Effort normal.   Musculoskeletal: She  exhibits no edema.   Skin: Skin is warm and dry.   Psychiatric: Mood, memory, affect and judgment normal.   Vitals reviewed.    Assessment and plan:  Edwina was seen today for recheck.    Diagnoses and all orders for this visit:    Acute chest pain.  Reviewed possible causes of chest pain with patient, including chest wall pain.  She is worried about ischemic heart disease, therefore we will proceed with exercise stress test.  It is however more likely that she had musculoskeletal pain.  She reports that she has recently restarted exercise that includes weight training.  She had a 3 week break prior to resuming her physical activity.  She restarted physical activity shortly before she had an onset of chest pain.  Patient will schedule stress test at her earliest convenience.  She will be contacted with test results.  -     Exercise Stress Echocardiogram; Future        Total time spent 25 minutes.  More than 50% of the time spent with Ms. Wynne on counseling / coordinating her care    Blanca Heard MD

## 2018-05-02 NOTE — MR AVS SNAPSHOT
After Visit Summary   5/2/2018    Edwina Wynne    MRN: [id number]           Patient Information     Date Of Birth          1970        Visit Information        Provider Department      5/2/2018 12:40 PM Blanca Heard MD [facility]         Today's Diagnoses     Acute chest pain    -  1       Follow-ups after your visit        Future tests that were ordered for you today     Open Future Orders        Priority Expected Expires Ordered    Exercise Stress Echocardiogram Routine  5/2/2019 5/2/2018            Who to contact     Please call your clinic at [phone] to:    Ask questions about your health    Make or cancel appointments    Discuss your medicines    Learn about your test results    Speak to your doctor            Additional Information About Your Visit        [employer] Information     [email or web address] gives you secure access to your electronic health record. If you see a primary care provider, you can also send messages to your care team and make appointments. If you have questions, please call your primary care clinic.  If you do not have a primary care provider, please call [phone] and they will assist you.      [email or web address] is an electronic gateway that provides easy, online access to your medical records. With [email or web address], you can request a clinic appointment, read your test results, renew a prescription or communicate with your care team.     To access your existing account, please contact your [facility] or call [phone] for assistance.        Care EveryWhere ID     This is your Care EveryWhere ID. This could be used by other organizations to access your [place] medical records  [id number]        Your Vitals Were     Pulse Last Period Breastfeeding? BMI (Body Mass Index)          77 04/25/2018 No 24.69 kg/m2         Blood Pressure from Last 3 Encounters:   05/02/18 122/81   02/06/18 119/76   10/10/17 115/80    Weight  from Last 3 Encounters:   05/02/18 69.4 kg (153 lb)   02/06/18 68.8 kg (151 lb 11.2 oz)   10/10/17 73.9 kg (163 lb)               Primary Care Provider Office Phone # Fax #    Blanca Jennifer Heard [phone]       [facility] [address]  [facility] [address]        Equal Access to Services     MANISHA NEWTON : Hadii aad ku hadasho Soomaali, waaxda luqadaha, qaybta kaalmada adeegyada, waxdick josephin aban adeparisa welchtosinmargot tejada . So [facility] [phone].    ATENCIÓN: Si chris costa, tiene a knight disposición servicios gratuitos de asistencia lingüística. Claudetteame al [phone].    We comply with applicable federal civil rights laws and [place] laws. We do not discriminate on the basis of race, color, national origin, age, disability, sex, sexual orientation, or gender identity.            Thank you!     Thank you for choosing [facility]   for your care. Our goal is always to provide you with excellent care. Hearing back from our patients is one way we can continue to improve our services. Please take a few minutes to complete the written survey that you may receive in the mail after your visit with us. Thank you!             Your Updated Medication List - Protect others around you: Learn how to safely use, store and throw away your medicines at www.disposemymeds.org.          This list is accurate as of 5/2/18  1:19 PM.  Always use your most recent med list.                   Brand Name Dispense Instructions for use Diagnosis    CALCIUM + D PO      Take 1 tablet by mouth daily.        clobetasol 0.05 % external solution    TEMOVATE          IBUPROFEN PO      Take 600 mg by mouth every 4 hours as needed for moderate pain        ketoconazole 2 % shampoo    NIZORAL          tretinoin 0.025 % cream    RETIN-A    45 g    Use every night    Other acne

## 2018-05-06 ASSESSMENT — ENCOUNTER SYMPTOMS
NAUSEA: 0
VOMITING: 0
FEVER: 0
DIARRHEA: 0
PANIC: 0
BRUISES/BLEEDS EASILY: 0
ABDOMINAL PAIN: 0
DEPRESSION: 0
SINUS CONGESTION: 0
DECREASED CONCENTRATION: 0
INSOMNIA: 0
COUGH: 0
CONSTIPATION: 0
FATIGUE: 0
SWOLLEN GLANDS: 0
BREAST MASS: 0
ALTERED TEMPERATURE REGULATION: 0
NERVOUS/ANXIOUS: 0
POLYPHAGIA: 0
DYSPNEA ON EXERTION: 0
POLYDIPSIA: 0
CHILLS: 0
BREAST PAIN: 0
MEMORY LOSS: 0

## 2018-05-09 ENCOUNTER — RADIANT APPOINTMENT (OUTPATIENT)
Dept: MAMMOGRAPHY | Facility: CLINIC | Age: 48
End: 2018-05-09
Attending: INTERNAL MEDICINE
Payer: COMMERCIAL

## 2018-05-09 ENCOUNTER — RADIANT APPOINTMENT (OUTPATIENT)
Dept: CARDIOLOGY | Facility: CLINIC | Age: 48
End: 2018-05-09
Attending: INTERNAL MEDICINE
Payer: COMMERCIAL

## 2018-05-09 ENCOUNTER — TELEPHONE (OUTPATIENT)
Dept: INTERNAL MEDICINE | Facility: CLINIC | Age: 48
End: 2018-05-09

## 2018-05-09 DIAGNOSIS — R07.9 ACUTE CHEST PAIN: ICD-10-CM

## 2018-05-09 DIAGNOSIS — R94.39 ABNORMAL CARDIOVASCULAR STRESS TEST: Primary | ICD-10-CM

## 2018-05-09 DIAGNOSIS — Z12.31 VISIT FOR SCREENING MAMMOGRAM: ICD-10-CM

## 2018-05-09 PROCEDURE — 77067 SCR MAMMO BI INCL CAD: CPT

## 2018-05-09 RX ADMIN — Medication 7 ML: at 08:21

## 2018-05-09 NOTE — TELEPHONE ENCOUNTER
Reviewed stress test report. Recommend consultation with Cardiology.  Referral was placed.  Blanca Heard MD

## 2018-05-20 ENCOUNTER — APPOINTMENT (OUTPATIENT)
Dept: MRI IMAGING | Facility: CLINIC | Age: 48
DRG: 418 | End: 2018-05-20
Attending: EMERGENCY MEDICINE
Payer: COMMERCIAL

## 2018-05-20 ENCOUNTER — HOSPITAL ENCOUNTER (INPATIENT)
Facility: CLINIC | Age: 48
LOS: 3 days | Discharge: HOME | DRG: 418 | End: 2018-05-23
Attending: EMERGENCY MEDICINE | Admitting: SURGERY
Payer: COMMERCIAL

## 2018-05-20 ENCOUNTER — APPOINTMENT (OUTPATIENT)
Dept: ULTRASOUND IMAGING | Facility: CLINIC | Age: 48
DRG: 418 | End: 2018-05-20
Attending: EMERGENCY MEDICINE
Payer: COMMERCIAL

## 2018-05-20 DIAGNOSIS — R79.89 ELEVATED LFTS: ICD-10-CM

## 2018-05-20 DIAGNOSIS — R10.11 ABDOMINAL PAIN, RIGHT UPPER QUADRANT: ICD-10-CM

## 2018-05-20 DIAGNOSIS — R94.39 ABNORMAL CARDIOVASCULAR STRESS TEST: ICD-10-CM

## 2018-05-20 DIAGNOSIS — Z90.49 S/P LAPAROSCOPIC CHOLECYSTECTOMY: ICD-10-CM

## 2018-05-20 DIAGNOSIS — K80.42 CHOLEDOCHOLITHIASIS WITH ACUTE CHOLECYSTITIS: Primary | ICD-10-CM

## 2018-05-20 DIAGNOSIS — R10.13 ABDOMINAL PAIN, EPIGASTRIC: ICD-10-CM

## 2018-05-20 DIAGNOSIS — R93.5 ABNORMAL ABDOMINAL ULTRASOUND: ICD-10-CM

## 2018-05-20 DIAGNOSIS — R11.0 NAUSEA: ICD-10-CM

## 2018-05-20 DIAGNOSIS — R74.01 TRANSAMINITIS: ICD-10-CM

## 2018-05-20 LAB
ALBUMIN SERPL-MCNC: 3.5 G/DL (ref 3.4–5)
ALBUMIN UR-MCNC: NEGATIVE MG/DL
ALP SERPL-CCNC: 96 U/L (ref 40–150)
ALT SERPL W P-5'-P-CCNC: 310 U/L (ref 0–50)
AMORPH CRY #/AREA URNS HPF: ABNORMAL /HPF
ANION GAP SERPL CALCULATED.3IONS-SCNC: 5 MMOL/L (ref 3–14)
APAP SERPL-MCNC: 3 MG/L (ref 10–20)
APPEARANCE UR: ABNORMAL
AST SERPL W P-5'-P-CCNC: 548 U/L (ref 0–45)
BASOPHILS # BLD AUTO: 0 10E9/L (ref 0–0.2)
BASOPHILS NFR BLD AUTO: 0.2 %
BILIRUB DIRECT SERPL-MCNC: 0.7 MG/DL (ref 0–0.2)
BILIRUB SERPL-MCNC: 1.1 MG/DL (ref 0.2–1.3)
BILIRUB UR QL STRIP: NEGATIVE
BUN SERPL-MCNC: 11 MG/DL (ref 7–30)
CALCIUM SERPL-MCNC: 8.3 MG/DL (ref 8.5–10.1)
CHLORIDE SERPL-SCNC: 100 MMOL/L (ref 94–109)
CO2 SERPL-SCNC: 27 MMOL/L (ref 20–32)
COLOR UR AUTO: YELLOW
CREAT SERPL-MCNC: 0.64 MG/DL (ref 0.52–1.04)
DIFFERENTIAL METHOD BLD: ABNORMAL
EOSINOPHIL # BLD AUTO: 0.1 10E9/L (ref 0–0.7)
EOSINOPHIL NFR BLD AUTO: 1 %
ERYTHROCYTE [DISTWIDTH] IN BLOOD BY AUTOMATED COUNT: 11.8 % (ref 10–15)
FERRITIN SERPL-MCNC: 10 NG/ML (ref 8–252)
GFR SERPL CREATININE-BSD FRML MDRD: >90 ML/MIN/1.7M2
GLUCOSE SERPL-MCNC: 106 MG/DL (ref 70–99)
GLUCOSE UR STRIP-MCNC: NEGATIVE MG/DL
HCG UR QL: NEGATIVE
HCT VFR BLD AUTO: 34.4 % (ref 35–47)
HGB BLD-MCNC: 11.4 G/DL (ref 11.7–15.7)
HGB UR QL STRIP: NEGATIVE
IMM GRANULOCYTES # BLD: 0 10E9/L (ref 0–0.4)
IMM GRANULOCYTES NFR BLD: 0.2 %
INR PPP: 1.05 (ref 0.86–1.14)
INTERNAL QC OK POCT: YES
IRON SERPL-MCNC: 51 UG/DL (ref 35–180)
IRON SERPL-MCNC: 51 UG/DL (ref 35–180)
KETONES UR STRIP-MCNC: NEGATIVE MG/DL
LACTATE BLD-SCNC: 1.1 MMOL/L (ref 0.7–2)
LEUKOCYTE ESTERASE UR QL STRIP: NEGATIVE
LIPASE SERPL-CCNC: 196 U/L (ref 73–393)
LYMPHOCYTES # BLD AUTO: 1.1 10E9/L (ref 0.8–5.3)
LYMPHOCYTES NFR BLD AUTO: 18.4 %
MCH RBC QN AUTO: 29.4 PG (ref 26.5–33)
MCHC RBC AUTO-ENTMCNC: 33.1 G/DL (ref 31.5–36.5)
MCV RBC AUTO: 89 FL (ref 78–100)
MONOCYTES # BLD AUTO: 0.3 10E9/L (ref 0–1.3)
MONOCYTES NFR BLD AUTO: 5.6 %
MUCOUS THREADS #/AREA URNS LPF: PRESENT /LPF
NEUTROPHILS # BLD AUTO: 4.6 10E9/L (ref 1.6–8.3)
NEUTROPHILS NFR BLD AUTO: 74.6 %
NITRATE UR QL: NEGATIVE
NRBC # BLD AUTO: 0 10*3/UL
NRBC BLD AUTO-RTO: 0 /100
PH UR STRIP: 7 PH (ref 5–7)
PLATELET # BLD AUTO: 223 10E9/L (ref 150–450)
POTASSIUM SERPL-SCNC: 3.7 MMOL/L (ref 3.4–5.3)
PROT SERPL-MCNC: 6.8 G/DL (ref 6.8–8.8)
RBC # BLD AUTO: 3.88 10E12/L (ref 3.8–5.2)
RBC #/AREA URNS AUTO: 4 /HPF (ref 0–2)
SODIUM SERPL-SCNC: 133 MMOL/L (ref 133–144)
SOURCE: ABNORMAL
SP GR UR STRIP: 1.01 (ref 1–1.03)
SQUAMOUS #/AREA URNS AUTO: <1 /HPF (ref 0–1)
TROPONIN I SERPL-MCNC: <0.015 UG/L (ref 0–0.04)
UROBILINOGEN UR STRIP-MCNC: NORMAL MG/DL (ref 0–2)
WBC # BLD AUTO: 6.1 10E9/L (ref 4–11)
WBC #/AREA URNS AUTO: 0 /HPF (ref 0–5)

## 2018-05-20 PROCEDURE — 83605 ASSAY OF LACTIC ACID: CPT | Performed by: EMERGENCY MEDICINE

## 2018-05-20 PROCEDURE — 96361 HYDRATE IV INFUSION ADD-ON: CPT | Performed by: EMERGENCY MEDICINE

## 2018-05-20 PROCEDURE — 81001 URINALYSIS AUTO W/SCOPE: CPT | Performed by: EMERGENCY MEDICINE

## 2018-05-20 PROCEDURE — 82248 BILIRUBIN DIRECT: CPT | Performed by: EMERGENCY MEDICINE

## 2018-05-20 PROCEDURE — 83690 ASSAY OF LIPASE: CPT | Performed by: EMERGENCY MEDICINE

## 2018-05-20 PROCEDURE — 81025 URINE PREGNANCY TEST: CPT | Performed by: STUDENT IN AN ORGANIZED HEALTH CARE EDUCATION/TRAINING PROGRAM

## 2018-05-20 PROCEDURE — 74183 MRI ABD W/O CNTR FLWD CNTR: CPT

## 2018-05-20 PROCEDURE — 85025 COMPLETE CBC W/AUTO DIFF WBC: CPT | Performed by: EMERGENCY MEDICINE

## 2018-05-20 PROCEDURE — 82728 ASSAY OF FERRITIN: CPT | Performed by: EMERGENCY MEDICINE

## 2018-05-20 PROCEDURE — 86803 HEPATITIS C AB TEST: CPT | Performed by: EMERGENCY MEDICINE

## 2018-05-20 PROCEDURE — 96365 THER/PROPH/DIAG IV INF INIT: CPT | Mod: 59 | Performed by: EMERGENCY MEDICINE

## 2018-05-20 PROCEDURE — 25000128 H RX IP 250 OP 636: Performed by: EMERGENCY MEDICINE

## 2018-05-20 PROCEDURE — 86706 HEP B SURFACE ANTIBODY: CPT | Performed by: EMERGENCY MEDICINE

## 2018-05-20 PROCEDURE — 99285 EMERGENCY DEPT VISIT HI MDM: CPT | Mod: 25 | Performed by: EMERGENCY MEDICINE

## 2018-05-20 PROCEDURE — 84484 ASSAY OF TROPONIN QUANT: CPT | Performed by: EMERGENCY MEDICINE

## 2018-05-20 PROCEDURE — 93975 VASCULAR STUDY: CPT | Mod: TC

## 2018-05-20 PROCEDURE — 80053 COMPREHEN METABOLIC PANEL: CPT | Performed by: EMERGENCY MEDICINE

## 2018-05-20 PROCEDURE — 21400006 ZZH R&B CCU INTERMEDIATE UMMC

## 2018-05-20 PROCEDURE — 96376 TX/PRO/DX INJ SAME DRUG ADON: CPT | Performed by: EMERGENCY MEDICINE

## 2018-05-20 PROCEDURE — 83540 ASSAY OF IRON: CPT | Performed by: EMERGENCY MEDICINE

## 2018-05-20 PROCEDURE — 86708 HEPATITIS A ANTIBODY: CPT | Performed by: EMERGENCY MEDICINE

## 2018-05-20 PROCEDURE — 85610 PROTHROMBIN TIME: CPT | Performed by: EMERGENCY MEDICINE

## 2018-05-20 PROCEDURE — 99222 1ST HOSP IP/OBS MODERATE 55: CPT | Mod: GC | Performed by: INTERNAL MEDICINE

## 2018-05-20 PROCEDURE — 87340 HEPATITIS B SURFACE AG IA: CPT | Performed by: EMERGENCY MEDICINE

## 2018-05-20 PROCEDURE — 80329 ANALGESICS NON-OPIOID 1 OR 2: CPT | Performed by: EMERGENCY MEDICINE

## 2018-05-20 PROCEDURE — 96375 TX/PRO/DX INJ NEW DRUG ADDON: CPT | Performed by: EMERGENCY MEDICINE

## 2018-05-20 RX ORDER — IOPAMIDOL 755 MG/ML
92 INJECTION, SOLUTION INTRAVASCULAR ONCE
Status: DISCONTINUED | OUTPATIENT
Start: 2018-05-20 | End: 2018-05-20

## 2018-05-20 RX ORDER — GADOBUTROL 604.72 MG/ML
7.5 INJECTION INTRAVENOUS ONCE
Status: COMPLETED | OUTPATIENT
Start: 2018-05-20 | End: 2018-05-21

## 2018-05-20 RX ORDER — LORAZEPAM 2 MG/ML
1 INJECTION INTRAMUSCULAR ONCE
Status: COMPLETED | OUTPATIENT
Start: 2018-05-20 | End: 2018-05-20

## 2018-05-20 RX ORDER — SODIUM CHLORIDE 9 MG/ML
1000 INJECTION, SOLUTION INTRAVENOUS CONTINUOUS
Status: DISCONTINUED | OUTPATIENT
Start: 2018-05-20 | End: 2018-05-22

## 2018-05-20 RX ORDER — PIPERACILLIN SODIUM, TAZOBACTAM SODIUM 4; .5 G/20ML; G/20ML
4.5 INJECTION, POWDER, LYOPHILIZED, FOR SOLUTION INTRAVENOUS ONCE
Status: COMPLETED | OUTPATIENT
Start: 2018-05-20 | End: 2018-05-20

## 2018-05-20 RX ORDER — HYDROMORPHONE HYDROCHLORIDE 1 MG/ML
0.5 INJECTION, SOLUTION INTRAMUSCULAR; INTRAVENOUS; SUBCUTANEOUS
Status: COMPLETED | OUTPATIENT
Start: 2018-05-20 | End: 2018-05-20

## 2018-05-20 RX ORDER — ONDANSETRON 2 MG/ML
4 INJECTION INTRAMUSCULAR; INTRAVENOUS EVERY 30 MIN PRN
Status: DISCONTINUED | OUTPATIENT
Start: 2018-05-20 | End: 2018-05-22

## 2018-05-20 RX ADMIN — LORAZEPAM 1 MG: 2 INJECTION INTRAMUSCULAR; INTRAVENOUS at 23:36

## 2018-05-20 RX ADMIN — HYDROMORPHONE HYDROCHLORIDE 0.5 MG: 1 INJECTION, SOLUTION INTRAMUSCULAR; INTRAVENOUS; SUBCUTANEOUS at 16:55

## 2018-05-20 RX ADMIN — SODIUM CHLORIDE 1000 ML: 9 INJECTION, SOLUTION INTRAVENOUS at 20:43

## 2018-05-20 RX ADMIN — SODIUM CHLORIDE 1000 ML: 9 INJECTION, SOLUTION INTRAVENOUS at 17:42

## 2018-05-20 RX ADMIN — PIPERACILLIN SODIUM,TAZOBACTAM SODIUM 4.5 G: 4; .5 INJECTION, POWDER, FOR SOLUTION INTRAVENOUS at 20:43

## 2018-05-20 RX ADMIN — ONDANSETRON 4 MG: 2 INJECTION INTRAMUSCULAR; INTRAVENOUS at 16:55

## 2018-05-20 RX ADMIN — HYDROMORPHONE HYDROCHLORIDE 0.5 MG: 1 INJECTION, SOLUTION INTRAMUSCULAR; INTRAVENOUS; SUBCUTANEOUS at 20:41

## 2018-05-20 ASSESSMENT — ENCOUNTER SYMPTOMS
ABDOMINAL PAIN: 1
DIFFICULTY URINATING: 0
NAUSEA: 1
HEMATURIA: 0
DIARRHEA: 0
VOMITING: 0
FREQUENCY: 0
DYSURIA: 0

## 2018-05-20 NOTE — ED PROVIDER NOTES
"  History     Chief Complaint   Patient presents with     Abdominal Pain     HPI  Edwina Wynne is a 47 year old female with a history of hypertension who presents to the Emergency Department for evaluation of abdominal pain. The patient reports onset of epigastric abdominal pain last night. She says the pain resolved with Advil and heat; however, she states the pain returned this morning and has gradually worsened with associated nausea. She denies any vomiting. She denies any diarrhea. She states she has had pain like this twice in the past, but has never had any work-up.  She denies any regular alcohol use she did have 2 drinks last night.  She also states that she took 2 Tylenol today but does not use Tylenol regularly.      I have reviewed the Medications, Allergies, Past Medical and Surgical History, and Social History in the Epic system.    Review of Systems   Gastrointestinal: Positive for abdominal pain and nausea. Negative for diarrhea and vomiting.   Genitourinary: Negative for difficulty urinating, dysuria, frequency and hematuria.   All other systems reviewed and are negative.      Physical Exam   BP: (!) 169/96  Pulse: 75  Heart Rate: 69  Temp: 98.1  F (36.7  C)  Resp: 18  Height: 167.6 cm (5' 6\")  Weight: 68 kg (150 lb)  SpO2: 100 %      Physical Exam   Constitutional: She is oriented to person, place, and time. She appears well-developed and well-nourished.   HENT:   Head: Atraumatic.   Right Ear: External ear normal.   Left Ear: External ear normal.   Eyes: Conjunctivae and EOM are normal. No scleral icterus.   Neck: Normal range of motion. Neck supple.   Cardiovascular: Normal rate and regular rhythm.    Pulmonary/Chest: Effort normal. No respiratory distress.   Abdominal: Soft. There is tenderness. There is no rebound and no guarding.   Midepigastric tenderness to palpation   Musculoskeletal: Normal range of motion. She exhibits no edema or tenderness.   Neurological: She is alert " and oriented to person, place, and time.   Skin: Skin is warm and dry. No rash noted.   Psychiatric: She has a normal mood and affect. Her behavior is normal.   Nursing note and vitals reviewed.      ED Course     ED Course     Procedures         Labs Ordered and Resulted from Time of ED Arrival Up to the Time of Departure from the ED   CBC WITH PLATELETS DIFFERENTIAL - Abnormal; Notable for the following:        Result Value    Hemoglobin 11.4 (*)     Hematocrit 34.4 (*)     All other components within normal limits   COMPREHENSIVE METABOLIC PANEL - Abnormal; Notable for the following:     Glucose 106 (*)     Calcium 8.3 (*)      (*)      (*)     All other components within normal limits   ROUTINE UA WITH MICROSCOPIC - Abnormal; Notable for the following:     RBC Urine 4 (*)     Mucous Urine Present (*)     Amorphous Crystals Moderate (*)     All other components within normal limits   BILIRUBIN DIRECT - Abnormal; Notable for the following:     Bilirubin Direct 0.7 (*)     All other components within normal limits   HCG QUAL URINE POCT - Normal   LIPASE   TROPONIN I   LACTIC ACID WHOLE BLOOD   ACETAMINOPHEN LEVEL   INR   IRON   IRON   FERRITIN            Assessments & Plan (with Medical Decision Making)   47-year-old female who presents today with upper abdominal pain.  Is well-appearing but does have some tenderness on exam.  Her vital signs are unremarkable.  Her labs today for notable for a normal white blood cell count.  She does however have an ALT of 310 and AST of 540.  Her bilirubin and alk phos are normal.  Initial plan was to obtain a CT but given her transaminitis elected to obtain a right upper quadrant ultrasound.  Because of her transaminitis a Tylenol level, hepatitis panel were also obtained and were negative.     Ultra[employer] was ordered at the time of shift change.  Patient signed out to Dr. Hardin to follow-up on these results and disposition pending    I have reviewed the nursing  notes.    I have reviewed the findings, diagnosis, plan and need for follow up with the patient.    Discharge Medication List as of 5/23/2018  1:19 PM          Final diagnoses:   Transaminitis   Abdominal pain, right upper quadrant   IGage, am serving as a trained medical scribe to document services personally performed by Izabela Simeon MD, based on the provider's statements to me.      I, Izabela Simeon MD, was physically present and have reviewed and verified the accuracy of this note documented by Gage Beckman.       5/20/2018   [facility], [place], EMERGENCY DEPARTMENT     Izabela Simeon MD  05/27/18 0923

## 2018-05-20 NOTE — ED TRIAGE NOTES
"Triage Assessment & Note:    BP (!) 169/96  Pulse 75  Temp 98.1  F (36.7  C) (Oral)  Resp 18  Ht 1.676 m (5' 6\")  Wt 68 kg (150 lb)  LMP 05/20/2018  SpO2 100%  BMI 24.21 kg/m2    Patient presents with: c/o LUQ abdominal pain x 24 hrs. PT reports nausea without vomiting. PT denies changes in bowel and bladder habits.     Home Treatments/Remedies: heat pack, tylenol, ibuprofen.     Febrile / Afebrile? Afebrile    Duration of C/o:  24hrs     Louis Xiong  May 20, 2018      "

## 2018-05-20 NOTE — IP AVS SNAPSHOT
[address] [address]    Phone:  [phone]                                       After Visit Summary   5/20/2018    Edwina Wynne    MRN: [id number]           After Visit Summary Signature Page     I have received my discharge instructions, and my questions have been answered. I have discussed any challenges I see with this plan with the nurse or doctor.    ..........................................................................................................................................  Patient/Patient Representative Signature      ..........................................................................................................................................  Patient Representative Print Name and Relationship to Patient    ..................................................               ................................................  Date                                            Time    ..........................................................................................................................................  Reviewed by Signature/Title    ...................................................              ..............................................  Date                                                            Time

## 2018-05-20 NOTE — IP AVS SNAPSHOT
MRN:[id number]                      After Visit Summary   5/20/2018    Edwina Wynne    MRN: [id number]           Thank you!     Thank you for choosing [facility] for your care. Our goal is always to provide you with excellent care. Hearing back from our patients is one way we can continue to improve our services. Please take a few minutes to complete the written survey that you may receive in the mail after you visit with us. Thank you!        Patient Information     Date Of Birth          1970        Designated Caregiver       Most Recent Value    Caregiver    Will someone help with your care after discharge? yes    Name of designated caregiver Wayne Wynne    Phone number of caregiver [phone]    Caregiver address See face sheet      About your hospital stay     You were admitted on:  May 21, 2018 You last received care in the:  [address] [facility]    You were discharged on:  May 23, 2018        Reason for your hospital stay       S/p coronary angiography and laparoscopic cholecystectomy                  Who to Call     For medical emergencies, please call 911.  For non-urgent questions about your medical care, please call your primary care provider or clinic, [phone]  For questions related to your surgery, please call your surgery clinic        Attending Provider     Provider Specialty    Izabela Simeon MD Emergency Medicine    Wilfred, Jarred Lozoya MD Emergency Medicine    Brandon Reagan MD General Surgery       Primary Care Provider Office Phone # Fax #    Blanca Jennifer Heard [phone]      After Care Instructions     Activity       Your activity upon discharge: Regular activity as tolerated.            Diet       Follow this diet upon discharge: Orders Placed This Encounter      Regular Diet Adult            Discharge Instructions       Discharge Instructions  Activity  - No strenuous exercise for 3-4 weeks  - No lifting,  pushing, pulling more than 15-20 pounds for 3-4 weeks    Incisions  - Steri-strips will fall off on their own in approximately 7-10 days  - Overlying primapore dressing can be removed tomorrow    Drain Care  - You do not have a drain.  Specific care/instructions:  Not Applicable    Medications  - Do not take any additional Tylenol (acetaminophen) while using a narcotic pain medication which includes acetaminophen  - Do not take more than 4,000mg of acetaminophen in any 24 hour period, as this can cause liver damage  - Take stool softeners such as Senna or Miralax while you are using narcotics, but stop if you develop diarrhea  - Wean yourself off of narcotic pain medications    Follow-Up:  - Call your primary care provider to touch base regarding your recent admission.     - Call or return sooner than your regularly scheduled visit if you develop any of the following: fever >101.5, uncontrolled pain, uncontrolled nausea or vomiting, as well as increased redness, swelling, or drainage from your wound.   -  A nurse from the General Surgery Clinic will contact you 24 hours, or next business day, after your discharge from the hospital.  If you have questions or to schedule a follow up appointment please call the General Surgery Clinic at [phone].  Call [phone] and ask to speak with the Surgery resident on call if you are having troubles in the evenings, at night, or on the weekend.  -  If you are receiving home care please inform your home care nurse of our contact number.                  Follow-up Appointments     Adult [facility] Follow-up and recommended labs and tests       Follow up with PCP for post hospitalization follow up in 2-3 weeks. No follow up labs or test are needed.    Follow up with general surgery clinic in 2-3 weeks for post surgery follow up. No follow up labs or tests are needed.    Appointments on [facility] and/or [facility] (with [facility] or [facility] provider or service). Call [phone]  "if you haven't heard regarding these appointments within 7 days of discharge.                  Additional Information     If you use hormonal birth control (such as the pill, patch, ring or implants): You'll need a second form of birth control for 7 days (condoms, a diaphragm or contraceptive foam). While in the hospital, you received a medicine called Bridion. Your normal birth control will not work as well for a week after taking this medicine.          Pending Results     Date and Time Order Name Status Description    5/22/2018 1658 Surgical pathology exam In process     5/22/2018 1303 EKG 12-lead, tracing only Preliminary             Statement of Approval     Ordered          05/23/18 1312  I have reviewed and agree with all the recommendations and orders detailed in this document.  EFFECTIVE NOW     Approved and electronically signed by:  Wayne Castellanos MD             Admission Information     Date & Time Provider Department Dept. Phone    5/20/2018 Brandon Reagan MD [address] [facility] [phone]      Your Vitals Were     Blood Pressure Pulse Temperature Respirations Height Weight    126/71 (BP Location: Left arm) 70 98.5  F (36.9  C) (Oral) 18 1.676 m (5' 6\") 69.4 kg (153 lb)    Last Period Pulse Oximetry BMI (Body Mass Index)             05/20/2018 99% 24.69 kg/m2         [employer] Information     [employer] gives you secure access to your electronic health record. If you see a primary care provider, you can also send messages to your care team and make appointments. If you have questions, please call your primary care clinic.  If you do not have a primary care provider, please call [phone] and they will assist you.        Care EveryWhere ID     This is your Care EveryWhere ID. This could be used by other organizations to access your [facility] medical records  [id number]        Equal Access to Services     MANISHA NEWTON : hortencia Millard qaybta " "elysejackie diaz padmini opalin hayaan myeshaparisa harperjulio ah. So [facility] [phone].    ATENCIÓN: Si chris costa, tiene a knight disposición servicios gratuitos de asistencia lingüística. Oc al [phone].    We comply with applicable federal civil rights laws and [place] laws. We do not discriminate on the basis of race, color, national origin, age, disability, sex, sexual orientation, or gender identity.               Review of your medicines      START taking        Dose / Directions    oxyCODONE IR 5 MG tablet   Commonly known as:  ROXICODONE   Used for:  S/P laparoscopic cholecystectomy        Dose:  5-10 mg   Take 1-2 tablets (5-10 mg) by mouth every 6 hours as needed for moderate to severe pain   Quantity:  15 tablet   Refills:  0         CONTINUE these medicines which have NOT CHANGED        Dose / Directions    CALCIUM + D PO        Dose:  1 tablet   Take 1 tablet by mouth daily.   Refills:  0       clobetasol 0.05 % external solution   Commonly known as:  TEMOVATE        Refills:  2       IBUPROFEN PO        Dose:  600 mg   Take 600 mg by mouth every 4 hours as needed for moderate pain   Refills:  0       ketoconazole 2 % shampoo   Commonly known as:  NIZORAL        Refills:  11       tretinoin 0.025 % cream   Commonly known as:  RETIN-A   Used for:  Other acne        Use every night   Quantity:  45 g   Refills:  6            Where to get your medicines      Some of these will need a paper prescription and others can be bought over the counter. Ask your nurse if you have questions.     Bring a paper prescription for each of these medications     oxyCODONE IR 5 MG tablet                Protect others around you: Learn how to safely use, store and throw away your medicines at www.disposemymeds.org.        Information about your nerve block     Today you received a block to numb the nerves near your surgery site.    This is a block using local anesthetic or \"numbing\" medication injected around the nerves " "to anesthetize or \"numb\" the area supplied by those nerves. This block is injected into the muscle layer near your surgical site. The type of anesthesia (Exparel) your anesthesia team used to numb your abdomen may give you relief for up to 72 hours.     Diet: There are no diet restrictions, but you should drink plenty of fluids, unless you are on a fluid-restricted diet.     Activity: If your surgical site is an arm or leg you should be careful with your affected limb, since it is possible to injure your limb without being aware of it due to the numbing. Until full feeling returns, you should guard against bumping or hitting your limb, and avoid extreme hot or cold temperatures on the skin.    Pain Medication: As the block wears off, the feeling will return as a tingling or prickly sensation near your surgical site. You will experience more discomfort from your incisions as the feeling returns. You may want to take a pain pill (a narcotic or Tylenol if this was prescribed by your surgeon) when you start to experience mild pain, before the pain becomes more severe. If your pain medications do not control your pain, you should notify your surgeon. If you are taking narcotics for pain management, do not drink alcohol, drive a car, or perform hazardous activities.  If you have questions or concerns you may call your surgeon at the number provided with your discharge instructions.     Call your surgeon if you experience blurry vision, ringing in the ears or metallic taste in your mouth.         Information about OPIOIDS     PRESCRIPTION OPIOIDS: WHAT YOU NEED TO KNOW   You have a prescription for an opioid (narcotic) pain medicine. Opioids can cause addiction. If you have a history of chemical dependency of any type, you are at a higher risk of becoming addicted to opioids. Only take this medicine after all other options have been tried. Take it for as short a time and as few doses as possible.     Do not:    Drive. If " you drive while taking these medicines, you could be arrested for driving under the influence (DUI).    Operate heavy machinery    Do any other dangerous activities while taking these medicines.     Drink any alcohol while taking these medicines.      Take with any other medicines that contain acetaminophen. Read all labels carefully. Look for the word  acetaminophen  or  Tylenol.  Ask your pharmacist if you have questions or are unsure.    Store your pills in a secure place, locked if possible. We will not replace any lost or stolen medicine. If you don t finish your medicine, please throw away (dispose) as directed by your pharmacist. The [place] Pollution [employer] has more information about safe disposal: https://www.pca.[facility].[place].us/living-green/managing-unwanted-medications    All opioids tend to cause constipation. Drink plenty of water and eat foods that have a lot of fiber, such as fruits, vegetables, prune juice, apple juice and high-fiber cereal. Take a laxative (Miralax, milk of magnesia, Colace, Senna) if you don t move your bowels at least every other day.              Medication List: This is a list of all your medications and when to take them. Check marks below indicate your daily home schedule. Keep this list as a reference.      Medications           Morning Afternoon Evening Bedtime As Needed    CALCIUM + D PO   Take 1 tablet by mouth daily.                                clobetasol 0.05 % external solution   Commonly known as:  TEMOVATE                                IBUPROFEN PO   Take 600 mg by mouth every 4 hours as needed for moderate pain                                ketoconazole 2 % shampoo   Commonly known as:  NIZORAL                                oxyCODONE IR 5 MG tablet   Commonly known as:  ROXICODONE   Take 1-2 tablets (5-10 mg) by mouth every 6 hours as needed for moderate to severe pain   Last time this was given:  10 mg on 5/23/2018  8:43 AM                                 tretinoin 0.025 % cream   Commonly known as:  RETIN-A   Use every night

## 2018-05-21 ENCOUNTER — ANESTHESIA EVENT (OUTPATIENT)
Dept: SURGERY | Facility: CLINIC | Age: 48
DRG: 418 | End: 2018-05-21
Payer: COMMERCIAL

## 2018-05-21 ENCOUNTER — APPOINTMENT (OUTPATIENT)
Dept: CARDIOLOGY | Facility: CLINIC | Age: 48
DRG: 418 | End: 2018-05-21
Attending: INTERNAL MEDICINE
Payer: COMMERCIAL

## 2018-05-21 PROBLEM — K80.42 CHOLEDOCHOLITHIASIS WITH ACUTE CHOLECYSTITIS: Status: ACTIVE | Noted: 2018-05-21

## 2018-05-21 LAB
ALBUMIN SERPL-MCNC: 3.2 G/DL (ref 3.4–5)
ALP SERPL-CCNC: 125 U/L (ref 40–150)
ALT SERPL W P-5'-P-CCNC: 516 U/L (ref 0–50)
ANION GAP SERPL CALCULATED.3IONS-SCNC: 6 MMOL/L (ref 3–14)
AST SERPL W P-5'-P-CCNC: 645 U/L (ref 0–45)
BILIRUB DIRECT SERPL-MCNC: 0.3 MG/DL (ref 0–0.2)
BILIRUB SERPL-MCNC: 0.8 MG/DL (ref 0.2–1.3)
BUN SERPL-MCNC: 7 MG/DL (ref 7–30)
CALCIUM SERPL-MCNC: 8.1 MG/DL (ref 8.5–10.1)
CHLORIDE SERPL-SCNC: 108 MMOL/L (ref 94–109)
CO2 SERPL-SCNC: 24 MMOL/L (ref 20–32)
CREAT SERPL-MCNC: 0.58 MG/DL (ref 0.52–1.04)
ERYTHROCYTE [DISTWIDTH] IN BLOOD BY AUTOMATED COUNT: 12.1 % (ref 10–15)
GFR SERPL CREATININE-BSD FRML MDRD: >90 ML/MIN/1.7M2
GLUCOSE SERPL-MCNC: 99 MG/DL (ref 70–99)
HAV IGG SER QL IA: NONREACTIVE
HBV SURFACE AB SERPL IA-ACNC: 6.9 M[IU]/ML
HBV SURFACE AG SERPL QL IA: NONREACTIVE
HCT VFR BLD AUTO: 33.8 % (ref 35–47)
HCV AB SERPL QL IA: NONREACTIVE
HGB BLD-MCNC: 11.1 G/DL (ref 11.7–15.7)
INR PPP: 1.03 (ref 0.86–1.14)
MAGNESIUM SERPL-MCNC: 2.2 MG/DL (ref 1.6–2.3)
MCH RBC QN AUTO: 29.4 PG (ref 26.5–33)
MCHC RBC AUTO-ENTMCNC: 32.8 G/DL (ref 31.5–36.5)
MCV RBC AUTO: 90 FL (ref 78–100)
PHOSPHATE SERPL-MCNC: 3.2 MG/DL (ref 2.5–4.5)
PLATELET # BLD AUTO: 199 10E9/L (ref 150–450)
POTASSIUM SERPL-SCNC: 3.6 MMOL/L (ref 3.4–5.3)
PROT SERPL-MCNC: 6.5 G/DL (ref 6.8–8.8)
RBC # BLD AUTO: 3.77 10E12/L (ref 3.8–5.2)
SODIUM SERPL-SCNC: 139 MMOL/L (ref 133–144)
WBC # BLD AUTO: 4.7 10E9/L (ref 4–11)

## 2018-05-21 PROCEDURE — 25500064 ZZH RX 255 OP 636: Performed by: SURGERY

## 2018-05-21 PROCEDURE — C1894 INTRO/SHEATH, NON-LASER: HCPCS

## 2018-05-21 PROCEDURE — 93454 CORONARY ARTERY ANGIO S&I: CPT

## 2018-05-21 PROCEDURE — 25000128 H RX IP 250 OP 636: Performed by: EMERGENCY MEDICINE

## 2018-05-21 PROCEDURE — 27210946 ZZH KIT HC TOTES DISP CR8

## 2018-05-21 PROCEDURE — 93306 TTE W/DOPPLER COMPLETE: CPT | Mod: 26 | Performed by: INTERNAL MEDICINE

## 2018-05-21 PROCEDURE — 99152 MOD SED SAME PHYS/QHP 5/>YRS: CPT

## 2018-05-21 PROCEDURE — 27210893 ZZH CATH CR5

## 2018-05-21 PROCEDURE — 85610 PROTHROMBIN TIME: CPT | Performed by: STUDENT IN AN ORGANIZED HEALTH CARE EDUCATION/TRAINING PROGRAM

## 2018-05-21 PROCEDURE — 25000128 H RX IP 250 OP 636: Performed by: INTERNAL MEDICINE

## 2018-05-21 PROCEDURE — 85027 COMPLETE CBC AUTOMATED: CPT | Performed by: STUDENT IN AN ORGANIZED HEALTH CARE EDUCATION/TRAINING PROGRAM

## 2018-05-21 PROCEDURE — 21400006 ZZH R&B CCU INTERMEDIATE UMMC

## 2018-05-21 PROCEDURE — 25000132 ZZH RX MED GY IP 250 OP 250 PS 637: Performed by: INTERNAL MEDICINE

## 2018-05-21 PROCEDURE — 86901 BLOOD TYPING SEROLOGIC RH(D): CPT | Performed by: INTERNAL MEDICINE

## 2018-05-21 PROCEDURE — 93454 CORONARY ARTERY ANGIO S&I: CPT | Mod: 26 | Performed by: INTERNAL MEDICINE

## 2018-05-21 PROCEDURE — 25000125 ZZHC RX 250: Performed by: INTERNAL MEDICINE

## 2018-05-21 PROCEDURE — 84100 ASSAY OF PHOSPHORUS: CPT | Performed by: STUDENT IN AN ORGANIZED HEALTH CARE EDUCATION/TRAINING PROGRAM

## 2018-05-21 PROCEDURE — A9585 GADOBUTROL INJECTION: HCPCS | Performed by: EMERGENCY MEDICINE

## 2018-05-21 PROCEDURE — 36415 COLL VENOUS BLD VENIPUNCTURE: CPT | Performed by: STUDENT IN AN ORGANIZED HEALTH CARE EDUCATION/TRAINING PROGRAM

## 2018-05-21 PROCEDURE — 27210787 ZZH MANIFOLD CR2

## 2018-05-21 PROCEDURE — 86900 BLOOD TYPING SEROLOGIC ABO: CPT | Performed by: INTERNAL MEDICINE

## 2018-05-21 PROCEDURE — 25000128 H RX IP 250 OP 636: Performed by: STUDENT IN AN ORGANIZED HEALTH CARE EDUCATION/TRAINING PROGRAM

## 2018-05-21 PROCEDURE — 82248 BILIRUBIN DIRECT: CPT | Performed by: STUDENT IN AN ORGANIZED HEALTH CARE EDUCATION/TRAINING PROGRAM

## 2018-05-21 PROCEDURE — 27211089 ZZH KIT ACIST INJECTOR CR3

## 2018-05-21 PROCEDURE — 40000264 ECHO COMPLETE WITH OPTISON

## 2018-05-21 PROCEDURE — 25000132 ZZH RX MED GY IP 250 OP 250 PS 637: Performed by: STUDENT IN AN ORGANIZED HEALTH CARE EDUCATION/TRAINING PROGRAM

## 2018-05-21 PROCEDURE — 80053 COMPREHEN METABOLIC PANEL: CPT | Performed by: STUDENT IN AN ORGANIZED HEALTH CARE EDUCATION/TRAINING PROGRAM

## 2018-05-21 PROCEDURE — 83735 ASSAY OF MAGNESIUM: CPT | Performed by: STUDENT IN AN ORGANIZED HEALTH CARE EDUCATION/TRAINING PROGRAM

## 2018-05-21 PROCEDURE — B2111ZZ FLUOROSCOPY OF MULTIPLE CORONARY ARTERIES USING LOW OSMOLAR CONTRAST: ICD-10-PCS | Performed by: INTERNAL MEDICINE

## 2018-05-21 PROCEDURE — 86850 RBC ANTIBODY SCREEN: CPT | Performed by: INTERNAL MEDICINE

## 2018-05-21 RX ORDER — NIFEDIPINE 10 MG/1
10 CAPSULE ORAL
Status: DISCONTINUED | OUTPATIENT
Start: 2018-05-21 | End: 2018-05-21 | Stop reason: HOSPADM

## 2018-05-21 RX ORDER — HYDROMORPHONE HYDROCHLORIDE 1 MG/ML
.3-.5 INJECTION, SOLUTION INTRAMUSCULAR; INTRAVENOUS; SUBCUTANEOUS
Status: DISCONTINUED | OUTPATIENT
Start: 2018-05-21 | End: 2018-05-22

## 2018-05-21 RX ORDER — POTASSIUM CHLORIDE 29.8 MG/ML
20 INJECTION INTRAVENOUS
Status: DISCONTINUED | OUTPATIENT
Start: 2018-05-21 | End: 2018-05-21 | Stop reason: HOSPADM

## 2018-05-21 RX ORDER — FLUMAZENIL 0.1 MG/ML
0.2 INJECTION, SOLUTION INTRAVENOUS
Status: DISCONTINUED | OUTPATIENT
Start: 2018-05-21 | End: 2018-05-21 | Stop reason: HOSPADM

## 2018-05-21 RX ORDER — SODIUM CHLORIDE, SODIUM LACTATE, POTASSIUM CHLORIDE, CALCIUM CHLORIDE 600; 310; 30; 20 MG/100ML; MG/100ML; MG/100ML; MG/100ML
1000 INJECTION, SOLUTION INTRAVENOUS CONTINUOUS
Status: DISCONTINUED | OUTPATIENT
Start: 2018-05-21 | End: 2018-05-22

## 2018-05-21 RX ORDER — ENALAPRILAT 1.25 MG/ML
1.25-2.5 INJECTION INTRAVENOUS
Status: DISCONTINUED | OUTPATIENT
Start: 2018-05-21 | End: 2018-05-21 | Stop reason: HOSPADM

## 2018-05-21 RX ORDER — LIDOCAINE 40 MG/G
CREAM TOPICAL
Status: DISCONTINUED | OUTPATIENT
Start: 2018-05-21 | End: 2018-05-23 | Stop reason: HOSPADM

## 2018-05-21 RX ORDER — PROTAMINE SULFATE 10 MG/ML
25-100 INJECTION, SOLUTION INTRAVENOUS EVERY 5 MIN PRN
Status: DISCONTINUED | OUTPATIENT
Start: 2018-05-21 | End: 2018-05-21 | Stop reason: HOSPADM

## 2018-05-21 RX ORDER — ONDANSETRON 4 MG/1
4 TABLET, ORALLY DISINTEGRATING ORAL EVERY 6 HOURS PRN
Status: DISCONTINUED | OUTPATIENT
Start: 2018-05-21 | End: 2018-05-23 | Stop reason: HOSPADM

## 2018-05-21 RX ORDER — FUROSEMIDE 10 MG/ML
20-100 INJECTION INTRAMUSCULAR; INTRAVENOUS
Status: DISCONTINUED | OUTPATIENT
Start: 2018-05-21 | End: 2018-05-21 | Stop reason: HOSPADM

## 2018-05-21 RX ORDER — DOBUTAMINE HYDROCHLORIDE 200 MG/100ML
2-20 INJECTION INTRAVENOUS CONTINUOUS PRN
Status: DISCONTINUED | OUTPATIENT
Start: 2018-05-21 | End: 2018-05-21 | Stop reason: HOSPADM

## 2018-05-21 RX ORDER — NALOXONE HYDROCHLORIDE 0.4 MG/ML
.2-.4 INJECTION, SOLUTION INTRAMUSCULAR; INTRAVENOUS; SUBCUTANEOUS
Status: ACTIVE | OUTPATIENT
Start: 2018-05-21 | End: 2018-05-22

## 2018-05-21 RX ORDER — ASPIRIN 325 MG
325 TABLET ORAL
Status: DISCONTINUED | OUTPATIENT
Start: 2018-05-21 | End: 2018-05-21 | Stop reason: HOSPADM

## 2018-05-21 RX ORDER — DOPAMINE HYDROCHLORIDE 160 MG/100ML
2-20 INJECTION, SOLUTION INTRAVENOUS CONTINUOUS PRN
Status: DISCONTINUED | OUTPATIENT
Start: 2018-05-21 | End: 2018-05-21 | Stop reason: HOSPADM

## 2018-05-21 RX ORDER — EPTIFIBATIDE 2 MG/ML
2 INJECTION, SOLUTION INTRAVENOUS CONTINUOUS PRN
Status: DISCONTINUED | OUTPATIENT
Start: 2018-05-21 | End: 2018-05-21 | Stop reason: HOSPADM

## 2018-05-21 RX ORDER — NICARDIPINE HYDROCHLORIDE 2.5 MG/ML
100 INJECTION INTRAVENOUS
Status: DISCONTINUED | OUTPATIENT
Start: 2018-05-21 | End: 2018-05-21 | Stop reason: HOSPADM

## 2018-05-21 RX ORDER — NITROGLYCERIN 20 MG/100ML
.07-2 INJECTION INTRAVENOUS CONTINUOUS PRN
Status: DISCONTINUED | OUTPATIENT
Start: 2018-05-21 | End: 2018-05-21 | Stop reason: HOSPADM

## 2018-05-21 RX ORDER — HYDRALAZINE HYDROCHLORIDE 20 MG/ML
10-20 INJECTION INTRAMUSCULAR; INTRAVENOUS
Status: DISCONTINUED | OUTPATIENT
Start: 2018-05-21 | End: 2018-05-21 | Stop reason: HOSPADM

## 2018-05-21 RX ORDER — PROMETHAZINE HYDROCHLORIDE 25 MG/ML
6.25-25 INJECTION, SOLUTION INTRAMUSCULAR; INTRAVENOUS EVERY 4 HOURS PRN
Status: DISCONTINUED | OUTPATIENT
Start: 2018-05-21 | End: 2018-05-21 | Stop reason: HOSPADM

## 2018-05-21 RX ORDER — ARGATROBAN 1 MG/ML
350 INJECTION, SOLUTION INTRAVENOUS
Status: DISCONTINUED | OUTPATIENT
Start: 2018-05-21 | End: 2018-05-21 | Stop reason: HOSPADM

## 2018-05-21 RX ORDER — METHYLPREDNISOLONE SODIUM SUCCINATE 125 MG/2ML
125 INJECTION, POWDER, LYOPHILIZED, FOR SOLUTION INTRAMUSCULAR; INTRAVENOUS
Status: DISCONTINUED | OUTPATIENT
Start: 2018-05-21 | End: 2018-05-21 | Stop reason: HOSPADM

## 2018-05-21 RX ORDER — POTASSIUM CHLORIDE 7.45 MG/ML
10 INJECTION INTRAVENOUS
Status: DISCONTINUED | OUTPATIENT
Start: 2018-05-21 | End: 2018-05-21 | Stop reason: HOSPADM

## 2018-05-21 RX ORDER — PHENYLEPHRINE HCL IN 0.9% NACL 1 MG/10 ML
20-100 SYRINGE (ML) INTRAVENOUS
Status: DISCONTINUED | OUTPATIENT
Start: 2018-05-21 | End: 2018-05-21 | Stop reason: HOSPADM

## 2018-05-21 RX ORDER — SODIUM NITROPRUSSIDE 25 MG/ML
100-200 INJECTION INTRAVENOUS
Status: DISCONTINUED | OUTPATIENT
Start: 2018-05-21 | End: 2018-05-21 | Stop reason: HOSPADM

## 2018-05-21 RX ORDER — NALOXONE HYDROCHLORIDE 0.4 MG/ML
.1-.4 INJECTION, SOLUTION INTRAMUSCULAR; INTRAVENOUS; SUBCUTANEOUS
Status: DISCONTINUED | OUTPATIENT
Start: 2018-05-21 | End: 2018-05-23 | Stop reason: HOSPADM

## 2018-05-21 RX ORDER — METOPROLOL TARTRATE 1 MG/ML
5 INJECTION, SOLUTION INTRAVENOUS EVERY 5 MIN PRN
Status: DISCONTINUED | OUTPATIENT
Start: 2018-05-21 | End: 2018-05-21 | Stop reason: HOSPADM

## 2018-05-21 RX ORDER — FLUMAZENIL 0.1 MG/ML
0.2 INJECTION, SOLUTION INTRAVENOUS
Status: ACTIVE | OUTPATIENT
Start: 2018-05-21 | End: 2018-05-22

## 2018-05-21 RX ORDER — ATROPINE SULFATE 0.1 MG/ML
0.5 INJECTION INTRAVENOUS EVERY 5 MIN PRN
Status: ACTIVE | OUTPATIENT
Start: 2018-05-21 | End: 2018-05-22

## 2018-05-21 RX ORDER — DIPHENHYDRAMINE HYDROCHLORIDE 50 MG/ML
25-50 INJECTION INTRAMUSCULAR; INTRAVENOUS
Status: DISCONTINUED | OUTPATIENT
Start: 2018-05-21 | End: 2018-05-21 | Stop reason: HOSPADM

## 2018-05-21 RX ORDER — CLOPIDOGREL BISULFATE 75 MG/1
300-600 TABLET ORAL
Status: DISCONTINUED | OUTPATIENT
Start: 2018-05-21 | End: 2018-05-21 | Stop reason: HOSPADM

## 2018-05-21 RX ORDER — LORAZEPAM 2 MG/ML
.5-2 INJECTION INTRAMUSCULAR EVERY 4 HOURS PRN
Status: DISCONTINUED | OUTPATIENT
Start: 2018-05-21 | End: 2018-05-21 | Stop reason: HOSPADM

## 2018-05-21 RX ORDER — ASPIRIN 81 MG/1
81-324 TABLET, CHEWABLE ORAL
Status: DISCONTINUED | OUTPATIENT
Start: 2018-05-21 | End: 2018-05-21 | Stop reason: HOSPADM

## 2018-05-21 RX ORDER — DEXTROSE MONOHYDRATE 25 G/50ML
12.5-5 INJECTION, SOLUTION INTRAVENOUS EVERY 30 MIN PRN
Status: DISCONTINUED | OUTPATIENT
Start: 2018-05-21 | End: 2018-05-21 | Stop reason: HOSPADM

## 2018-05-21 RX ORDER — CLOPIDOGREL BISULFATE 75 MG/1
75 TABLET ORAL
Status: DISCONTINUED | OUTPATIENT
Start: 2018-05-21 | End: 2018-05-21 | Stop reason: HOSPADM

## 2018-05-21 RX ORDER — NALOXONE HYDROCHLORIDE 0.4 MG/ML
0.4 INJECTION, SOLUTION INTRAMUSCULAR; INTRAVENOUS; SUBCUTANEOUS EVERY 5 MIN PRN
Status: DISCONTINUED | OUTPATIENT
Start: 2018-05-21 | End: 2018-05-21 | Stop reason: HOSPADM

## 2018-05-21 RX ORDER — ATROPINE SULFATE 0.1 MG/ML
.5-1 INJECTION INTRAVENOUS
Status: DISCONTINUED | OUTPATIENT
Start: 2018-05-21 | End: 2018-05-21 | Stop reason: HOSPADM

## 2018-05-21 RX ORDER — SODIUM CHLORIDE 9 MG/ML
INJECTION, SOLUTION INTRAVENOUS CONTINUOUS
Status: DISCONTINUED | OUTPATIENT
Start: 2018-05-21 | End: 2018-05-22

## 2018-05-21 RX ORDER — ONDANSETRON 2 MG/ML
4 INJECTION INTRAMUSCULAR; INTRAVENOUS EVERY 6 HOURS PRN
Status: DISCONTINUED | OUTPATIENT
Start: 2018-05-21 | End: 2018-05-23 | Stop reason: HOSPADM

## 2018-05-21 RX ORDER — ASPIRIN 325 MG
325 TABLET ORAL DAILY
Status: DISCONTINUED | OUTPATIENT
Start: 2018-05-21 | End: 2018-05-23 | Stop reason: HOSPADM

## 2018-05-21 RX ORDER — ACETAMINOPHEN 325 MG/1
650 TABLET ORAL EVERY 4 HOURS PRN
Status: DISCONTINUED | OUTPATIENT
Start: 2018-05-21 | End: 2018-05-23 | Stop reason: HOSPADM

## 2018-05-21 RX ORDER — PIPERACILLIN SODIUM, TAZOBACTAM SODIUM 3; .375 G/15ML; G/15ML
3.38 INJECTION, POWDER, LYOPHILIZED, FOR SOLUTION INTRAVENOUS EVERY 6 HOURS
Status: DISCONTINUED | OUTPATIENT
Start: 2018-05-21 | End: 2018-05-23

## 2018-05-21 RX ORDER — EPTIFIBATIDE 2 MG/ML
180 INJECTION, SOLUTION INTRAVENOUS EVERY 10 MIN PRN
Status: DISCONTINUED | OUTPATIENT
Start: 2018-05-21 | End: 2018-05-21 | Stop reason: HOSPADM

## 2018-05-21 RX ORDER — EPINEPHRINE 1 MG/ML
0.3 INJECTION, SOLUTION, CONCENTRATE INTRAVENOUS
Status: DISCONTINUED | OUTPATIENT
Start: 2018-05-21 | End: 2018-05-21 | Stop reason: HOSPADM

## 2018-05-21 RX ORDER — ONDANSETRON 2 MG/ML
4 INJECTION INTRAMUSCULAR; INTRAVENOUS EVERY 4 HOURS PRN
Status: DISCONTINUED | OUTPATIENT
Start: 2018-05-21 | End: 2018-05-21 | Stop reason: HOSPADM

## 2018-05-21 RX ORDER — NALOXONE HYDROCHLORIDE 0.4 MG/ML
.1-.4 INJECTION, SOLUTION INTRAMUSCULAR; INTRAVENOUS; SUBCUTANEOUS
Status: DISCONTINUED | OUTPATIENT
Start: 2018-05-21 | End: 2018-05-22

## 2018-05-21 RX ORDER — PROTAMINE SULFATE 10 MG/ML
1-5 INJECTION, SOLUTION INTRAVENOUS
Status: DISCONTINUED | OUTPATIENT
Start: 2018-05-21 | End: 2018-05-21 | Stop reason: HOSPADM

## 2018-05-21 RX ORDER — VERAPAMIL HYDROCHLORIDE 2.5 MG/ML
1-5 INJECTION, SOLUTION INTRAVENOUS
Status: DISCONTINUED | OUTPATIENT
Start: 2018-05-21 | End: 2018-05-21 | Stop reason: HOSPADM

## 2018-05-21 RX ORDER — HEPARIN SODIUM 1000 [USP'U]/ML
1000-10000 INJECTION, SOLUTION INTRAVENOUS; SUBCUTANEOUS EVERY 5 MIN PRN
Status: DISCONTINUED | OUTPATIENT
Start: 2018-05-21 | End: 2018-05-21 | Stop reason: HOSPADM

## 2018-05-21 RX ORDER — NITROGLYCERIN 5 MG/ML
100-200 VIAL (ML) INTRAVENOUS
Status: DISCONTINUED | OUTPATIENT
Start: 2018-05-21 | End: 2018-05-21 | Stop reason: HOSPADM

## 2018-05-21 RX ORDER — PRASUGREL 10 MG/1
10-60 TABLET, FILM COATED ORAL
Status: DISCONTINUED | OUTPATIENT
Start: 2018-05-21 | End: 2018-05-21 | Stop reason: HOSPADM

## 2018-05-21 RX ORDER — FENTANYL CITRATE 50 UG/ML
25-50 INJECTION, SOLUTION INTRAMUSCULAR; INTRAVENOUS
Status: DISCONTINUED | OUTPATIENT
Start: 2018-05-21 | End: 2018-05-21 | Stop reason: HOSPADM

## 2018-05-21 RX ORDER — OXYCODONE HYDROCHLORIDE 5 MG/1
5 TABLET ORAL EVERY 4 HOURS PRN
Status: DISCONTINUED | OUTPATIENT
Start: 2018-05-21 | End: 2018-05-22

## 2018-05-21 RX ORDER — NITROGLYCERIN 5 MG/ML
100-500 VIAL (ML) INTRAVENOUS
Status: COMPLETED | OUTPATIENT
Start: 2018-05-21 | End: 2018-05-21

## 2018-05-21 RX ORDER — NITROGLYCERIN 0.4 MG/1
0.4 TABLET SUBLINGUAL EVERY 5 MIN PRN
Status: DISCONTINUED | OUTPATIENT
Start: 2018-05-21 | End: 2018-05-21 | Stop reason: HOSPADM

## 2018-05-21 RX ORDER — ARGATROBAN 1 MG/ML
150 INJECTION, SOLUTION INTRAVENOUS
Status: DISCONTINUED | OUTPATIENT
Start: 2018-05-21 | End: 2018-05-21 | Stop reason: HOSPADM

## 2018-05-21 RX ORDER — ADENOSINE 3 MG/ML
12-12000 INJECTION, SOLUTION INTRAVENOUS
Status: DISCONTINUED | OUTPATIENT
Start: 2018-05-21 | End: 2018-05-21 | Stop reason: HOSPADM

## 2018-05-21 RX ORDER — LIDOCAINE HYDROCHLORIDE 10 MG/ML
30 INJECTION, SOLUTION EPIDURAL; INFILTRATION; INTRACAUDAL; PERINEURAL
Status: DISCONTINUED | OUTPATIENT
Start: 2018-05-21 | End: 2018-05-21 | Stop reason: HOSPADM

## 2018-05-21 RX ORDER — MAGNESIUM HYDROXIDE 1200 MG/15ML
1000 LIQUID ORAL CONTINUOUS PRN
Status: DISCONTINUED | OUTPATIENT
Start: 2018-05-21 | End: 2018-05-21 | Stop reason: HOSPADM

## 2018-05-21 RX ADMIN — PIPERACILLIN SODIUM AND TAZOBACTAM SODIUM 3.38 G: 3; .375 INJECTION, POWDER, LYOPHILIZED, FOR SOLUTION INTRAVENOUS at 10:51

## 2018-05-21 RX ADMIN — SODIUM CHLORIDE, POTASSIUM CHLORIDE, SODIUM LACTATE AND CALCIUM CHLORIDE 1000 ML: 600; 310; 30; 20 INJECTION, SOLUTION INTRAVENOUS at 00:42

## 2018-05-21 RX ADMIN — ONDANSETRON 4 MG: 2 INJECTION INTRAMUSCULAR; INTRAVENOUS at 00:41

## 2018-05-21 RX ADMIN — SODIUM CHLORIDE: 9 INJECTION, SOLUTION INTRAVENOUS at 17:02

## 2018-05-21 RX ADMIN — FENTANYL CITRATE 50 MCG: 50 INJECTION, SOLUTION INTRAMUSCULAR; INTRAVENOUS at 15:28

## 2018-05-21 RX ADMIN — ACETAMINOPHEN 650 MG: 325 TABLET, FILM COATED ORAL at 10:44

## 2018-05-21 RX ADMIN — PIPERACILLIN SODIUM AND TAZOBACTAM SODIUM 3.38 G: 3; .375 INJECTION, POWDER, LYOPHILIZED, FOR SOLUTION INTRAVENOUS at 17:02

## 2018-05-21 RX ADMIN — MIDAZOLAM 1 MG: 1 INJECTION INTRAMUSCULAR; INTRAVENOUS at 15:31

## 2018-05-21 RX ADMIN — PIPERACILLIN SODIUM AND TAZOBACTAM SODIUM 3.38 G: 3; .375 INJECTION, POWDER, LYOPHILIZED, FOR SOLUTION INTRAVENOUS at 05:00

## 2018-05-21 RX ADMIN — ACETAMINOPHEN 650 MG: 325 TABLET, FILM COATED ORAL at 23:02

## 2018-05-21 RX ADMIN — SODIUM CHLORIDE, POTASSIUM CHLORIDE, SODIUM LACTATE AND CALCIUM CHLORIDE 1000 ML: 600; 310; 30; 20 INJECTION, SOLUTION INTRAVENOUS at 13:09

## 2018-05-21 RX ADMIN — HYDROMORPHONE HYDROCHLORIDE 0.5 MG: 1 INJECTION, SOLUTION INTRAMUSCULAR; INTRAVENOUS; SUBCUTANEOUS at 00:42

## 2018-05-21 RX ADMIN — HEPARIN SODIUM 5000 UNITS: 1000 INJECTION, SOLUTION INTRAVENOUS; SUBCUTANEOUS at 15:34

## 2018-05-21 RX ADMIN — FENTANYL CITRATE 25 MCG: 50 INJECTION, SOLUTION INTRAMUSCULAR; INTRAVENOUS at 15:30

## 2018-05-21 RX ADMIN — ASPIRIN 325 MG ORAL TABLET 325 MG: 325 PILL ORAL at 07:48

## 2018-05-21 RX ADMIN — Medication 1500 UNITS: at 15:22

## 2018-05-21 RX ADMIN — FENTANYL CITRATE 25 MCG: 50 INJECTION, SOLUTION INTRAMUSCULAR; INTRAVENOUS at 15:38

## 2018-05-21 RX ADMIN — ACETAMINOPHEN 650 MG: 325 TABLET, FILM COATED ORAL at 17:26

## 2018-05-21 RX ADMIN — GADOBUTROL 7.5 ML: 604.72 INJECTION INTRAVENOUS at 00:27

## 2018-05-21 RX ADMIN — NICARDIPINE HYDROCHLORIDE 200 MCG: 2.5 INJECTION INTRAVENOUS at 15:35

## 2018-05-21 RX ADMIN — MIDAZOLAM 1 MG: 1 INJECTION INTRAMUSCULAR; INTRAVENOUS at 15:28

## 2018-05-21 RX ADMIN — Medication 500 UNITS: at 15:22

## 2018-05-21 RX ADMIN — HYDROMORPHONE HYDROCHLORIDE 0.5 MG: 1 INJECTION, SOLUTION INTRAMUSCULAR; INTRAVENOUS; SUBCUTANEOUS at 07:43

## 2018-05-21 RX ADMIN — NITROGLYCERIN 200 MCG: 5 INJECTION, SOLUTION INTRAVENOUS at 15:35

## 2018-05-21 RX ADMIN — PIPERACILLIN SODIUM AND TAZOBACTAM SODIUM 3.38 G: 3; .375 INJECTION, POWDER, LYOPHILIZED, FOR SOLUTION INTRAVENOUS at 23:03

## 2018-05-21 RX ADMIN — HUMAN ALBUMIN MICROSPHERES AND PERFLUTREN 6 ML: 10; .22 INJECTION, SOLUTION INTRAVENOUS at 10:00

## 2018-05-21 NOTE — PLAN OF CARE
"Problem: Pain, Acute (Adult)  Goal: Identify Related Risk Factors and Signs and Symptoms  Related risk factors and signs and symptoms are identified upon initiation of Human Response Clinical Practice Guideline (CPG).   Outcome: No Change  ./51  Pulse 76  Temp 98.3  F (36.8  C) (Oral)  Resp 16  Ht 1.676 m (5' 6\")  Wt 68 kg (150 lb)  LMP 05/20/2018  SpO2 97%  BMI 24.21 kg/m2     Patient received one dose of IV dilaudid and one dose of Zofran with good relief; voiding and has her menses; had a BM yesterday; up independently; CARDS recommends coronary angiogram before choledocholithiasis (had MRCP last night to evaluate); appeared to sleep well after transfer onto  around 0100.    Patient was orientated to the room, admission paperwork completed, and plan of care was discussed.       "

## 2018-05-21 NOTE — PROGRESS NOTES
"Pt arrived to floor around 0030 from [facility] after being admitted via [facility] with abdominal pain.  Pt's , Wayne, had arrived to unit prior to her arrival asking that pt go to 6C/monitored bed due to her plan for an angiogram w/stenting in the AM.  Dr Tavares 1433 paged and spoke w/ at length.   stated \"if she doesn't go to 6C I'm taking her home\"  Pt arrived to 7B at this time via litter.  Patient and  spoke in private & patient is agreeing to stay after reinforcement of need for IVAB and closer monitoring.  Informed pt and  that MD would be notified and pt would be transferred if any cardiac symptoms developed.  Pt is in pain and with nausea and meds being given.  Dr Tavares updated that pt would be staying.    "

## 2018-05-21 NOTE — H&P
[facility] Surgery History and Physical    Edwina Wynne MRN# [id number]   Age: 47 year old YOB: 1970     Date of Admission:  5/20/2018    Date of Consult:   5/20/18    Reason for consult: Abdominal pain, epigastric/right upper quadrant       Requesting service: ED; requesting provider: Dr. Hardin              Assessment and Plan:   Assessment:   Edwina is a 47-year-old female who presents with epigastric and RUQ abdominal pain, elevated transaminitis and U/S with GB inflammation and CBD dilation, concerning for cholecystitis and choledocholithiasis.         Plan:   - Admit to general surgery, general care floor (7B).  - Cardiology consult for evaluation of new RCA ischemia and pre op risk assessment, appreciate input.  - Would recommend cholecystectomy +/- ERCP, pending cards evaluation. Will coordinate timing/plans with GI.   - GI consulted; would like MRCP now to eval for choledocholithiasis, potentially ERCP tomorrow. Appreciate recs.  - Repeat BMP, CBC, and LFT in AM. Also will obtain INR and type/screen.  - IV Zosyn for antibiotic coverage.  - Clears tonight, NPO at midnight.  - LR at 100 ml/hr.  - Cholecystectomy consent obtained.    Discussed with staff, Dr. Lenz.             Chief Complaint:   Epigastric/RUQ abdominal pain         History of Present Illness:     Edwina is a 47-year-old female who presents with epigastric and RUQ abdominal pain since last night.  She reports that pain initially improved with Advil and Tylenol, however worsened this morning and throughout the day, prompting her to present to the ED.  Her pain is constant, worse with meals, and not particularly associated with movement.  She also complains of nausea. No emesis, diarrhea, constipation, or fever/chills.  Was able to tolerate lunch, however did have worsening epigastric pain after eating.  Has had similar pain twice previously, however, has not had it worked up previously.     In the ED, she  remains afebrile and hemodynamically stable.  Workup was significant for RUQ ultrasound with findings suggestive of cholecystitis (GB wall thickening, pericholecystic fluid, stones) and dilated CBD. Labs also show elevated transaminitis, normal bilirubin and no leukocytosis.     Of note, 3 weeks ago she had new complaint of chest pain.  She was worked up with stress test, which was concerning for RCA ischemia (wall motion abnormalities with exercise).  She has not seen cardiology since stress test was obtained.  Currently denies chest pain or shortness of breath.          Past Medical History:   New chest pain, stress test w RCA ischemia  Pre-eclampsia, gestational HTN  HSV    Past Medical History:   Diagnosis Date     Anemia     PREGNANCY     Herpes simplex without mention of complication      HSV      Hypertension     Gestational     PONV (postoperative nausea and vomiting)     POST C SECTION     Pre-eclampsia              Past Surgical History:   Open umbilical hernia repair w/o mesh (Dr. Clinton, 10/2012)   section  Tubal ligation  Tuboplasty reanastomosis   Breast prosthesis     Past Surgical History:   Procedure Laterality Date     C BREAST PROSTHESIS, NOS  2004      SECTION       HERNIORRHAPHY VENTRAL  10/26/2012    Procedure: HERNIORRHAPHY VENTRAL;  Open Ventral Hernia Repair  ;  Surgeon: Shaun Clinton MD;  Location: [facility]     LAPAROSCOPY DIAGNOSTIC (GYN) Bilateral 2016    Procedure: LAPAROSCOPY DIAGNOSTIC (GYN);  Surgeon: Ventura Flower MD;  Location: [facility]     LAPAROTOMY MINI, TUBAL LIGATION (POST PARTUM), COMBINED  2012    Procedure:COMBINED LAPAROTOMY MINI, TUBAL LIGATION (POST PARTUM); Surgeon:RAVEN YARBROUGH; Location:[facility] L+[facility]     TUBOPLASTY REANASTOMOSIS Bilateral 2016    Procedure: TUBOPLASTY REANASTOMOSIS;  Surgeon: Ventura Flower MD;  Location: [facility]             Social History:   Prior smoker, quit about 20 years ago  Occasional etoh use, no heavy use,  not daily  Denies elicit drug use    Works at , also part time [employer] here at [employer]   also works at [employer]    Social History   Substance Use Topics     Smoking status: Former Smoker     Packs/day: 1.00     Years: 15.00     Types: Cigarettes     Quit date: 1/1/2000     Smokeless tobacco: Never Used     Alcohol use No      Comment: occas             Family History:   Maternal grandfather with CAD, MI and s/p CABG  Maternal grandmother with stroke  Father and brother with hypertension  No personal or family hx of trouble with anesthesia or bleeding/clotting disorders    Family History   Problem Relation Age of Onset     Hypertension Father      Hypertension Brother      CEREBROVASCULAR DISEASE Maternal Grandmother 50     Macular Degeneration Paternal Grandmother      Eye Disorder Other      Great aunt, retinitis pigmentosa     Asthma No family hx of      C.A.D. No family hx of      DIABETES No family hx of      Breast Cancer No family hx of      Cancer - colorectal No family hx of      Prostate Cancer No family hx of      Glaucoma No family hx of               Allergies:   No Known Allergies          Medications:   No daily home medications  Took ASA with chest pain episode 3 weeks ago    Current Facility-Administered Medications   Medication     ondansetron (ZOFRAN) injection 4 mg     piperacillin-tazobactam (ZOSYN) 4.5 g vial to attach to  mL bag     sodium chloride 0.9% infusion     Current Outpatient Prescriptions   Medication Sig     Calcium Carbonate-Vitamin D (CALCIUM + D PO) Take 1 tablet by mouth daily.     clobetasol (TEMOVATE) 0.05 % external solution      IBUPROFEN PO Take 600 mg by mouth every 4 hours as needed for moderate pain     ketoconazole (NIZORAL) 2 % shampoo      tretinoin (RETIN-A) 0.025 % cream Use every night               Review of Systems:   CONSTITUTIONAL: NEGATIVE for fever, chills, change in weight  ENT/MOUTH: NEGATIVE for ear, mouth and throat  problems  RESP: NEGATIVE for significant cough or SOB  CV: NEGATIVE for chest pain, palpitations or peripheral edema  GI: +abdominal pain, nausea. NEGATIVE for emesis, heartburn, constipation, diarrhea, or change in bowel habits. No hematochezia or melena.  : normal menstrual cycles, negative for, dysuria, urinary frequency and incontinence  MUSCULOSKELETAL: NEGATIVE for significant arthralgias or myalgia  NEURO: NEGATIVE for weakness, dizziness or paresthesias  ENDOCRINE: NEGATIVE for temperature intolerance, skin/hair changes  PSYCHIATRIC: NEGATIVE for changes in mood or affect          Physical Exam:   All vitals have been reviewed  Temp:  [98.1  F (36.7  C)] 98.1  F (36.7  C)  Pulse:  [75] 75  Resp:  [18] 18  BP: (124-169)/(75-96) 129/75  SpO2:  [98 %-100 %] 98 %  No intake or output data in the 24 hours ending 05/20/18 2006    Physical Exam:  General: no acute distress, resting in bed, comfortable, no diaphoresis  CV: regular rate, regular rhythm, 2+ radial pulses bilaterally  Resp: non labored respirations on room air, breathing comfortably  GI: abdomen soft, non-distended, RUQ and epigastric tenderness to palpation, no guarding or rebound tenderness, +grey, no peritonitis, well healed laparoscopic scars + pfannenstiel.  : normal external female genitalia  Ext: warm, well perfused, no peripheral edema  Neuro: alert, oriented, conversant, appropriate, non-focal, moves all extremities well            Data:   All laboratory data reviewed    Results:  BMP  Recent Labs  Lab 05/20/18  1659      POTASSIUM 3.7   CHLORIDE 100   CO2 27   BUN 11   CR 0.64   *     CBC  Recent Labs  Lab 05/20/18  1659   WBC 6.1   HGB 11.4*        LFT  Recent Labs  Lab 05/20/18  1659   *   *   ALKPHOS 96   BILITOTAL 1.1   ALBUMIN 3.5   INR 1.05       Recent Labs  Lab 05/20/18  1659   *       Imaging:  RUQ U/S (5/20): 1.  Mild hepatomegaly with otherwise normal Doppler evaluation of  the  liver.  2.  Cholelithiasis, pericholecystic fluid, and gallbladder wall  thickening. Additionally there is increased diameter of the common  bile duct. Otherwise, negative sonographic Hernandez sign. Findings are  equivocal for cholecystitis. If clinically indicated, further  evaluation could be performed with HIDA scan.      Carmen Tavares MD  General Surgery PGY-2  [phone]

## 2018-05-21 NOTE — ED NOTES
Initial Assessment: VSS. C/O returning abd pain. Pleasant and co-op. Denies nausea. Communicates needs without difficulty.

## 2018-05-21 NOTE — PROCEDURES
PRELIMINARY CARDIAC CATH REPORT:     PROCEDURES PERFORMED:   Coronary Angiography    PHYSICIANS:  Attending Physician: Shawn Gerardo MD  Interventional Cardiology Fellow: None  Cardiology Fellow: Rai Ying MD    INDICATION:  Edwina Wynne is a 47 year old female with a hx of acute cholecystitis, with an abnormal stress test done 3 weeks ago for chest pain. Presents now for pre-op evaluation in the setting of this abnormal stress test.     DESCRIPTION:  1. Consent obtained with discussion of risks.  All questions were answered.  2. Sterile prep and procedure.  3. Location with Sheaths:   RT Radial Arterial  6 Fr  10 cm [short]  4. Access: Local anesthetic with lidocaine.  A micropuncture 21 guage needle with ultrasound guidance was used to establish vascular access using a modified Seldinger technique.  5. Diagnostic Catheters:   5 Fr  Suman  6. Guiding Catheters:  None  6. Estimated blood loss: < 5 ml    MEDICATIONS:  The procedure was performed under conscious sedation for 12 minutes from 1528 to 1540.  The patient was assessed immediately before the first sedation medication was administered.  Midazolam 2 mg and Fentanyl 100 mcg were administered.  Heart rate, BP, respiration, oxygen saturation and patient responses were monitored throughout the procedure with the assistance of the RN under my supervision.  >> IA Nicardipine and IA NTG were administered to prophylax against radial artery spasm.  >> Antiplatelet Therapy:  mg    Procedures:    CORONARY ANGIOGRAM:   1. Both coronary arteries arise from their respective cusps.  2. Dominance: Right  3. The LM is without angiographic evidence of disease.   4. LAD: Type 3 [LAD supplies the entire apex].. The LAD gives rise to septal perforators, small D1 and small D2.  The pLAD has a 0% stenosis, mLAD has a 0% stenosis, dLAD has a 0% stenosis, D1 has a 0% stenosis and D2 has a 0% stenosis.    5. LCX gives rise to OM1 and OM2 vessels.  The  pLCx has a 0% stenosis, mLCx has a 0% stenosis, dLCx has a 0% stenosis, OM1 has a 0% stenosis and OM2 has a 0% stenosis.    6. RCA gives rise to PL branches and supplies PDA. The pRCA has a 0% stenosis, mRCA has a 0% stenosis, dRCA has a 0% stenosis, RPDA has a 0% stenosis and RPLA has a 0% stenosis.      Sheath Removal:  The RRA sheath was manually removed in the cardiac catheterization laboratory.    Contrast: Isovue, 33 ml     Fluoroscopy Time: 2.4 min    COMPLICATIONS:  1. None    SUMMARY:   Normal coronary arteries    PLAN:   >> Bedrest per protocol.  >> Continued medical management and lifestyle modification for cardiovascular risk factor optimization.   >>. Return to the primary inpatient team for further evaluation and management.    The attending interventional cardiologist was present and supervised all critical aspects the procedure.    Findings discussed with Cardiology Consult Team.    See CVIS report for final draft.    Rai Ying MD   Cardiology Fellow    Dr. Humaira MD   Cardiology Staff

## 2018-05-21 NOTE — CONSULTS
GASTROENTEROLOGY CONSULTATION      Date of Admission:  5/20/2018  Reason for Admission: Abdominal/chest pain  Date of Consult  5/21/2018   Requesting Physician:  Yanick Lenz MD           ASSESSMENT AND RECOMMENDATIONS:   Assessment:  47 year old female who is admitted with epigastric/RUQ abdominal pain, elevated transaminases and abdominal US showing GB inflammation, cholelithiasis and CBD dilation (9mm). Presentation consistent with cholecystitis. GI team had recommended MRCP, which was negative for choledocholithiasis. Would not recommend endoscopic intervention at this time. Cholecystectomy per surgery team pending cardiology clearance.      Recommendations:  No acute endoscopic intervention indicated.   Trend LFT  Analgesia/Antiemetics per primary team  Discussed with primary team    Gastroenterology follow up recommendations: none at this time    Thank you for involving us in this patient's care. Please do not hesitate to contact the GI service with any questions or concerns.     Pt seen and care plan discussed with Dr. Velasco, GI staff physician.    Mirtha Peñaloza PA-C  Advanced Endoscopy/Pancreaticobiliary [facility]  [facility]  Pager *[phone]  -------------------------------------------------------------------------------------------------------------------       Reason for Consultation:   Possible choledocholithiasis           History of Present Illness:   Patient seen and examined at 0945. History is obtained from the patient at bedside.    Edwina Wynne is a 47 year old female who is generally healthy who his admitted with worsening epigastric and RUQ abdominal pain that started 1 day PTA. She also notes similar pain event last week and one time about a year ago. Pain initially started in the epigastric region and started radiating across upper abdomen, R>L. Associated with nausea, no vomiting. No associated fevers or chills. Normal bowels. In the ED  she is afebrile and HDS. Her ALT and AST are elevated but Tbil and alk phos are normal. US of the abdomen obtained showing thickened GB wall (.4cm), pericholecystic fluid and large stone in GB neck. GI was contacted and recommended MRCP which shows substantial pericholecystic fluid and GB wall thickening and mild extra/intrahepatic nicky dil without CBD stone.    Of note the patient recently underwent stress test after complaining of chest pain. There was suggestion of ischemia with WMA during exercise. She is undergoing coronary angiography today. No history of heart disease.             Past Medical History:   Reviewed and edited as appropriate  Past Medical History:   Diagnosis Date     Anemia     PREGNANCY     Herpes simplex without mention of complication      HSV      Hypertension     Gestational     PONV (postoperative nausea and vomiting)     POST C SECTION     Pre-eclampsia             Past Surgical History:   Reviewed and edited as appropriate   Past Surgical History:   Procedure Laterality Date     C BREAST PROSTHESIS, NOS  2004      SECTION       HERNIORRHAPHY VENTRAL  10/26/2012    Procedure: HERNIORRHAPHY VENTRAL;  Open Ventral Hernia Repair  ;  Surgeon: Shaun Clinton MD;  Location: [facility] OR     LAPAROSCOPY DIAGNOSTIC (GYN) Bilateral 2016    Procedure: LAPAROSCOPY DIAGNOSTIC (GYN);  Surgeon: Ventura Flower MD;  Location: [facility]     LAPAROTOMY MINI, TUBAL LIGATION (POST PARTUM), COMBINED  2012    Procedure:COMBINED LAPAROTOMY MINI, TUBAL LIGATION (POST PARTUM); Surgeon:RAVEN YARBROUGH; Location:[facility]     TUBOPLASTY REANASTOMOSIS Bilateral 2016    Procedure: TUBOPLASTY REANASTOMOSIS;  Surgeon: Ventura Flower MD;  Location: [facility]              Social History:   The patient lives in [place] with her   Employer: [employer]    Alcohol: socially, not daily  Tobacco: quit 10-15 years ago  Illicit drugs: denies           Family History:   Reviewed and edited as  appropriate  Family History   Problem Relation Age of Onset     Hypertension Father      Hypertension Brother      CEREBROVASCULAR DISEASE Maternal Grandmother 50     Macular Degeneration Paternal Grandmother      Eye Disorder Other      Great aunt, retinitis pigmentosa     Asthma No family hx of      C.A.D. No family hx of      DIABETES No family hx of      Breast Cancer No family hx of      Cancer - colorectal No family hx of      Prostate Cancer No family hx of      Glaucoma No family hx of              Allergies:   Reviewed and edited as appropriate   No Known Allergies         Medications:     Current Facility-Administered Medications   Medication     acetaminophen (TYLENOL) tablet 650 mg     aspirin tablet 325 mg     HYDROmorphone (PF) (DILAUDID) injection 0.3-0.5 mg     lactated ringers infusion     lidocaine (LMX4) kit     lidocaine 1 % 1 mL     naloxone (NARCAN) injection 0.1-0.4 mg     ondansetron (ZOFRAN) injection 4 mg     ondansetron (ZOFRAN-ODT) ODT tab 4 mg    Or     ondansetron (ZOFRAN) injection 4 mg     oxyCODONE IR (ROXICODONE) tablet 5 mg     piperacillin-tazobactam (ZOSYN) 3.375 g vial to attach to  mL bag     sodium chloride (PF) 0.9% PF flush 3 mL     sodium chloride (PF) 0.9% PF flush 3 mL     sodium chloride 0.9% infusion             Review of Systems:   A complete review of systems was performed and is negative except as noted in the HPI           Physical Exam:   Temp: 98.4  F (36.9  C) Temp src: Oral BP: 121/73 Pulse: 67   Resp: 14 SpO2: 98 % O2 Device: None (Room air)    Wt:   Wt Readings from Last 2 Encounters:   05/21/18 68 kg (150 lb)   05/02/18 69.4 kg (153 lb)        General: WDWN female in NAD.  Answers appropriately.    HEENT: Head is AT/NC. Sclera anicteric. No conjunctival injection.  Oropharynx is clear, moist and w/o exudate or lesions.  Neck: No masses or thyromegaly.  Lungs: Clear to auscultation bilaterally.  No wheezes, rhonchi or crackles.    Heart: Regular rate and  rhythm.  No murmurs, gallops or rubs.  Normal S1 and S2.  Abdomen: Soft, tender epigastric and RUQ, neg murphys, non-distended.  BS +.  No hepatosplenomegaly. No rebound or peritoneal signs  Extremities: No pedal edema.  Heme/Lymph: No cervical or supraclavicular adenopathy.   Skin: No jaundice, rash  Neurologic: Grossly non-focal.  CN 2-12 grossly intact.            Data:   Labs and imaging below were independently reviewed and interpreted    LAB WORK:    BMP  Recent Labs  Lab 05/21/18  0704 05/20/18  1659    133   POTASSIUM 3.6 3.7   CHLORIDE 108 100   ALEX 8.1* 8.3*   CO2 24 27   BUN 7 11   CR 0.58 0.64   GLC 99 106*     CBC  Recent Labs  Lab 05/21/18  0704 05/20/18  1659   WBC 4.7 6.1   RBC 3.77* 3.88   HGB 11.1* 11.4*   HCT 33.8* 34.4*   MCV 90 89   MCH 29.4 29.4   MCHC 32.8 33.1   RDW 12.1 11.8    223     INR  Recent Labs  Lab 05/21/18  0704 05/20/18  1659   INR 1.03 1.05     LFTs  Recent Labs  Lab 05/21/18  0704 05/20/18  1659   ALKPHOS 125 96   * 548*   * 310*   BILITOTAL 0.8 1.1   PROTTOTAL 6.5* 6.8   ALBUMIN 3.2* 3.5      PANC  Recent Labs  Lab 05/20/18  1659   LIPASE 196       IMAGING:  MRCP Without and With Contrast 5/21/2018     CLINICAL HISTORY: eval cholecystitis;      DATE: 5/21/2018 12:27 AM     TECHNIQUE:      Images were acquired with and without intravenous gadolinium contrast  through the upper abdomen. The following MR images were acquired  without intravenous contrast: TrueFISP, multiplanar T2-weighted, axial  T1 in/out of phase, T2-weighted MRCP images, axial diffusion-weighted  and axial apparent diffusion coefficient. T1-weighted images were  obtained before contrast at the multiple time points following  contrast injection. 3-D reformatted images were generated by the  technologist. Contrast dose: 7.5 ml Gadavist injected     Comparison study: Ultrasound abdomen 5/20/2018     FINDINGS:     Biliary Tree: The common bile duct is mildly dilated up to 9 mm. There  is  mild intrahepatic biliary ductal dilatation. No filling defect or  mass.     Pancreas: Normal.     Liver: No focal lesions. Noncirrhotic morphology. No steatosis. There  is mild heterogeneous arterial enhancement suggesting mild diffuse  inflammation. Additionally, there is mild periportal edema. A small  nonspecific area of increased T2 signal is seen in segment 7 (series  27 image 30) possibly representing a small amount of inflammation and  perihepatic fluid.     Gallbladder: There is pericholecystic fluid, with substantial  thickening of the gallbladder wall. There is a 1.4 x 1.3 cm gallstone  in the gallbladder neck.     Spleen: Normal.     Kidneys: Normal.     Adrenal glands: Normal.     Bowel: No bowel obstruction. Visualized portions of the stomach and  duodenum are normal. Mild gastric distention.     Lymph nodes: Several prominent lymph nodes in the ana luisa hepatis  measuring up to 11 mm (series 20, image 25).     Blood vessels: Normal     Lung bases: Clear.     Bones and soft tissues: Bilateral breast implants are intact.       Mesentery and abdominal wall: Normal.     Ascites: Mild amount of perisplenic fluid.         IMPRESSION:   1. Substantial pericholecystic fluid and gallbladder wall thickening.  Stone in the gallbladder neck. Findings raise suspicion for  cholecystitis.  2. Mild extrahepatic and intrahepatic biliary ductal dilatation. No  common bile duct stone.  3. Periportal edema and heterogeneous enhancement indicating some  degree of diffuse hepatic inflammation.        =======================================================================

## 2018-05-21 NOTE — CONSULTS
[facility], [place]    Cardiology Consultation    Date of Admission:  5/20/2018    Assessment & Plan   Edwina is a 47-year-old female who presents with epigastric and RUQ abdominal pain since last night. Workup was significant for RUQ ultrasound with findings suggestive of cholecystitis (GB wall thickening, pericholecystic fluid, stones) and dilated CBD. She was worked up with stress test, which was concerning for ischemia (wall motion abnormalities with exercise).  She has not seen cardiology since stress test was obtained, was supposed to see cardiology tomm.  Currently denies chest pain or shortness of breath.    Problem 1: Abnormal stress test (see below)  - Discussed heavily with Dr. Marshall. We believe patient would be high risk to proceed for surgery at this time. Given she is comfortable with the pain medications she received, we recommend a coronary angiogram tomorrow (prior to surgery) to further risk stratify. If she has left main or proximal LAD lesions, she will need to be revascularized prior to surgery. Alternative route may be an IR approach for percutaneous drainage, if she requires intervention tomorrow.  -coronary angiogram ordered for tomorrow am.   -aspirin 325 given prior to procedure.  -formal echo for tomorrow.     Hong Brown MD    Reason for Consult   Abnormal stress test    Primary Care Physician   Blanca Heard    Chief Complaint   Abdominal pain    History is obtained from the patient    History of Present Illness   Edwina is a 47-year-old female who presents with epigastric and RUQ abdominal pain since last night. She reports that pain initially improved with Advil and Tylenol, however worsened this morning and throughout the day, prompting her to present to the ED.  In the ED, she remains afebrile and hemodynamically stable.  Workup was significant for RUQ ultrasound with findings suggestive of cholecystitis (GB wall thickening, pericholecystic  fluid, stones) and dilated CBD. Labs also show elevated transaminitis, normal bilirubin and no leukocytosis.      Of note, 3 weeks ago she had new complaint of chest pain.  She was worked up with stress test, which was concerning for ischemia (wall motion abnormalities with exercise).  She has not seen cardiology since stress test was obtained, was supposed to see cardiology tomm.  Currently denies chest pain or shortness of breath.    She has not had chest pain since her episode at rest 3 weeks ago, and is not currently having any chest pain.    Stress test results were as follows:    The target heart rate was not achieved. Exercise terminated due to leg  fatigue. Normal heart rate and BP response to exercise.  Normal biventricular size, thickness, and global systolic function at  baseline, LVEF=60-65%.  With exercise, LVEF failed to increase and LV cavity size did not decrease.  There is exercise-induced hypokinesis involving the [id number] inferior  myocardial segments. This pattern is suggestive of ischemia in the RCA  territory.  Resting ECG shows sinus rhythm with incomplete RBBB and inferior T-wave  inversions. With exercise, there are up to 2mm horizontal ST depressions in  the inferior leads, which resolve 2:50 into the recovery phase.  No angina was elicited.  Functional capacity is normal for age.  No significant valvular abnormalities are noted on screening Doppler exam.  The aortic root and visualized ascending aorta are normal.    Past Medical History    I have reviewed this patient's medical history and updated it with pertinent information if needed.   Past Medical History:   Diagnosis Date     Anemia     PREGNANCY     Herpes simplex without mention of complication      HSV      Hypertension     Gestational     PONV (postoperative nausea and vomiting)     POST C SECTION     Pre-eclampsia        Past Surgical History   I have reviewed this patient's surgical history and updated it with pertinent  information if needed.  Past Surgical History:   Procedure Laterality Date     C BREAST PROSTHESIS, NOS  2004      SECTION       HERNIORRHAPHY VENTRAL  10/26/2012    Procedure: HERNIORRHAPHY VENTRAL;  Open Ventral Hernia Repair  ;  Surgeon: Shaun Clinton MD;  Location: [facility] OR     LAPAROSCOPY DIAGNOSTIC (GYN) Bilateral 2016    Procedure: LAPAROSCOPY DIAGNOSTIC (GYN);  Surgeon: Ventura Flower MD;  Location: [facility]     LAPAROTOMY MINI, TUBAL LIGATION (POST PARTUM), COMBINED  2012    Procedure:COMBINED LAPAROTOMY MINI, TUBAL LIGATION (POST PARTUM); Surgeon:RAVEN YARBROUGH; Location:[facility] L[facility]D     TUBOPLASTY REANASTOMOSIS Bilateral 2016    Procedure: TUBOPLASTY REANASTOMOSIS;  Surgeon: Ventura Flower MD;  Location: [facility]       Prior to Admission Medications   Prior to Admission Medications   Prescriptions Last Dose Informant Patient Reported? Taking?   Calcium Carbonate-Vitamin D (CALCIUM + D PO)   Yes No   Sig: Take 1 tablet by mouth daily.   IBUPROFEN PO   Yes No   Sig: Take 600 mg by mouth every 4 hours as needed for moderate pain   clobetasol (TEMOVATE) 0.05 % external solution   Yes No   ketoconazole (NIZORAL) 2 % shampoo   Yes No   tretinoin (RETIN-A) 0.025 % cream   No No   Sig: Use every night      Facility-Administered Medications: None     Allergies   No Known Allergies    Social History   I have reviewed this patient's social history and updated it with pertinent information if needed. Edwina Casiano Chikidanica Wynne  reports that she quit smoking about 18 years ago. Her smoking use included Cigarettes. She has a 15.00 pack-year smoking history. She has never used smokeless tobacco. She reports that she does not drink alcohol or use illicit drugs.    Family History   I have reviewed this patient's family history and updated it with pertinent information if needed.   Family History   Problem Relation Age of Onset     Hypertension Father      Hypertension Brother       CEREBROVASCULAR DISEASE Maternal Grandmother 50     Macular Degeneration Paternal Grandmother      Eye Disorder Other      Great aunt, retinitis pigmentosa     Asthma No family hx of      C.A.D. No family hx of      DIABETES No family hx of      Breast Cancer No family hx of      Cancer - colorectal No family hx of      Prostate Cancer No family hx of      Glaucoma No family hx of        Review of Systems   The 10 point Review of Systems is negative other than noted in the HPI or here.     Physical Exam   Temp: 98.1  F (36.7  C) Temp src: Oral BP: 121/71 Pulse: 75   Resp: 18 SpO2: 99 % O2 Device: None (Room air)    Vital Signs with Ranges  Temp:  [98.1  F (36.7  C)] 98.1  F (36.7  C)  Pulse:  [75] 75  Resp:  [18] 18  BP: (102-169)/(71-96) 121/71  SpO2:  [97 %-100 %] 99 %  150 lbs 0 oz    GEN: NAD, pleasant  HEENT: no icterus  CV: RRR, normal s1/s2, no murmurs/rubs/s3/s4, no heave. JVP normal.   CHEST: CTAB  ABD: soft, NT/ND, NABS  : no flank/suprapubic tenderness  NEURO: AA&Ox3, fluent/appropriate, motor grossly nonfocal  PSYCH: cooperative, affect appropriate    Data   -Data reviewed today: All pertinent laboratory and imaging results from this encounter were reviewed. I personally reviewed the EKG tracing showing SR.    Recent Labs  Lab 05/20/18  1659   WBC 6.1   HGB 11.4*   MCV 89      INR 1.05      POTASSIUM 3.7   CHLORIDE 100   CO2 27   BUN 11   CR 0.64   ANIONGAP 5   ALEX 8.3*   *   ALBUMIN 3.5   PROTTOTAL 6.8   BILITOTAL 1.1   ALKPHOS 96   *   *   LIPASE 196   TROPI <0.015       Imaging:  Recent Results (from the past 24 hour(s))   US Abdomen Complete w Doppler Complete    Narrative    EXAMINATION: US ABDOMEN COMPLETE WITH DOPPLER, 5/20/2018 7:14 PM     COMPARISON: None available    HISTORY: LFT elevation    TECHNIQUE: The abdomen was scanned in standard fashion with  specialized ultrasound transducer(s) using both gray-scale, color  Doppler, and spectral flow  techniques.    Findings:  Liver: The liver demonstrates normal homogeneous echotexture. No  evidence of a focal hepatic mass. Liver is enlarged measuring 23 cm in  length    Extrahepatic portal vein flow is antegrade, measuring 19 cm/sec.  Right portal vein flow is antegrade, measuring 27 cm/sec.  Left portal vein flow is antegrade, measuring 15 cm/sec.    Flow in the hepatic artery is towards the liver and:  124 cm/sec peak systolic  0.61 resistive index.     The splenic vein is patent and flow is towards the liver.  The left,  middle, and right hepatic veins are patent with flow towards the IVC.  The IVC is patent with flow towards the heart.   The visualized aorta  is not dilated.    Gallbladder: There is gallbladder wall thickening and pericholecystic  fluid. Echogenic stone within the gallbladder. Negative sonographic  Hernandez sign.    Bile Ducts: No intrahepatic biliary ductal dilatation. The common bile  duct measures 8.7 mm in diameter near the pancreatic head and 5 mm  closer to the liver.    Pancreas: Visualized portions of the head and body of the pancreas are  unremarkable.     Kidneys: Both kidneys are of normal echotexture, without mass or  hydronephrosis.  The craniocaudal dimensions are: right- 10.7 cm,  left- 11.6 cm.    Spleen: The spleen measures 9.9 cm in sagittal dimension.    Fluid: No evidence of ascites or pleural effusions.        Impression    Impression:   1.  Mild hepatomegaly with otherwise normal Doppler evaluation of the  liver.  2.  Cholelithiasis, pericholecystic fluid, and gallbladder wall  thickening. Additionally there is increased diameter of the common  bile duct. Otherwise, negative sonographic Hernandez sign. Findings are  equivocal for cholecystitis. If clinically indicated, further  evaluation could be performed with HIDA scan.      Findings of gallbladder wall thickening and pericholecystic fluid with  cholelithiasis were discussed with Dr. Hardin by telephone at  5/20/2018  7:35 PM by Dr. Younger.

## 2018-05-21 NOTE — ED NOTES
[facility], [place]   ED Nurse to Floor Handoff     Edwina Wynne is a 47 year old female who speaks English and lives with a spouse,  in a home  They arrived in the ED by car from home    ED Chief Complaint: Abdominal Pain    ED Dx;   Final diagnoses:   Transaminitis   Abdominal pain, right upper quadrant         Needed?: No    Allergies: No Known Allergies.  Past Medical Hx:   Past Medical History:   Diagnosis Date     Anemia     PREGNANCY     Herpes simplex without mention of complication      HSV      Hypertension     Gestational     PONV (postoperative nausea and vomiting)     POST C SECTION     Pre-eclampsia       Baseline Mental status: WDL  Current Mental Status changes: at basesline    Infection present or suspected this encounter: cultures pending  Sepsis suspected: No  Isolation type: No active isolations     Activity level - Baseline/Home:  Independent  Activity Level - Current:   Independent    Bariatric equipment needed?: No    In the ED these meds were given:   Medications   0.9% sodium chloride BOLUS (0 mLs Intravenous Stopped 5/20/18 2150)     Followed by   sodium chloride 0.9% infusion (1,000 mLs Intravenous New Bag 5/20/18 2043)   ondansetron (ZOFRAN) injection 4 mg (4 mg Intravenous Given 5/20/18 1655)   gadobutrol (GADAVIST) injection 7.5 mL (not administered)   HYDROmorphone (PF) (DILAUDID) injection 0.5 mg (0.5 mg Intravenous Given 5/20/18 1655)   piperacillin-tazobactam (ZOSYN) 4.5 g vial to attach to  mL bag (0 g Intravenous Stopped 5/20/18 2150)   HYDROmorphone (PF) (DILAUDID) injection 0.5 mg (0.5 mg Intravenous Given 5/20/18 2041)   LORazepam (ATIVAN) injection 1 mg (1 mg Intravenous Given 5/20/18 2336)       Drips running?  No    Home pump  No    Current LDAs  Peripheral IV 09/27/16 Left Hand (Active)   Number of days:600       Peripheral IV 05/20/18 Right Upper forearm (Active)   Number of days:0       Incision/Surgical Site  10/26/12 Midline Abdomen (Active)   Number of days:2032       Incision/Surgical Site 09/27/16 Abdomen (Active)   Number of days:600       Labs results:   Labs Ordered and Resulted from Time of ED Arrival Up to the Time of Departure from the ED   CBC WITH PLATELETS DIFFERENTIAL - Abnormal; Notable for the following:        Result Value    Hemoglobin 11.4 (*)     Hematocrit 34.4 (*)     All other components within normal limits   COMPREHENSIVE METABOLIC PANEL - Abnormal; Notable for the following:     Glucose 106 (*)     Calcium 8.3 (*)      (*)      (*)     All other components within normal limits   ROUTINE UA WITH MICROSCOPIC - Abnormal; Notable for the following:     RBC Urine 4 (*)     Mucous Urine Present (*)     Amorphous Crystals Moderate (*)     All other components within normal limits   BILIRUBIN DIRECT - Abnormal; Notable for the following:     Bilirubin Direct 0.7 (*)     All other components within normal limits   HCG QUAL URINE POCT - Normal   LIPASE   TROPONIN I   LACTIC ACID WHOLE BLOOD   ACETAMINOPHEN LEVEL   HEPATITIS B SURFACE ANTIBODY   HEPATITIS A ANTIBODY IGG   HEPATITIS B SURFACE ANTIGEN   HEPATITIS C ANTIBODY   INR   IRON   IRON   FERRITIN       Imaging Studies:   Recent Results (from the past 24 hour(s))   US Abdomen Complete w Doppler Complete    Narrative    EXAMINATION: US ABDOMEN COMPLETE WITH DOPPLER, 5/20/2018 7:14 PM     COMPARISON: None available    HISTORY: LFT elevation    TECHNIQUE: The abdomen was scanned in standard fashion with  specialized ultrasound transducer(s) using both gray-scale, color  Doppler, and spectral flow techniques.    Findings:  Liver: The liver demonstrates normal homogeneous echotexture. No  evidence of a focal hepatic mass. Liver is enlarged measuring 23 cm in  length    Extrahepatic portal vein flow is antegrade, measuring 19 cm/sec.  Right portal vein flow is antegrade, measuring 27 cm/sec.  Left portal vein flow is antegrade, measuring 15  "cm/sec.    Flow in the hepatic artery is towards the liver and:  124 cm/sec peak systolic  0.61 resistive index.     The splenic vein is patent and flow is towards the liver.  The left,  middle, and right hepatic veins are patent with flow towards the IVC.  The IVC is patent with flow towards the heart.   The visualized aorta  is not dilated.    Gallbladder: There is gallbladder wall thickening and pericholecystic  fluid. Echogenic stone within the gallbladder. Negative sonographic  Hernandez sign.    Bile Ducts: No intrahepatic biliary ductal dilatation. The common bile  duct measures 8.7 mm in diameter near the pancreatic head and 5 mm  closer to the liver.    Pancreas: Visualized portions of the head and body of the pancreas are  unremarkable.     Kidneys: Both kidneys are of normal echotexture, without mass or  hydronephrosis.  The craniocaudal dimensions are: right- 10.7 cm,  left- 11.6 cm.    Spleen: The spleen measures 9.9 cm in sagittal dimension.    Fluid: No evidence of ascites or pleural effusions.        Impression    Impression:   1.  Mild hepatomegaly with otherwise normal Doppler evaluation of the  liver.  2.  Cholelithiasis, pericholecystic fluid, and gallbladder wall  thickening. Additionally there is increased diameter of the common  bile duct. Otherwise, negative sonographic Hernandez sign. Findings are  equivocal for cholecystitis. If clinically indicated, further  evaluation could be performed with HIDA scan.      Findings of gallbladder wall thickening and pericholecystic fluid with  cholelithiasis were discussed with Dr. Hardin by telephone at  5/20/2018 7:35 PM by Dr. Younger.        Recent vital signs:   /71  Pulse 75  Temp 98.1  F (36.7  C) (Oral)  Resp 18  Ht 1.676 m (5' 6\")  Wt 68 kg (150 lb)  LMP 05/20/2018  SpO2 99%  BMI 24.21 kg/m2    Cardiac Rhythm: Normal Sinus  Pt needs tele? No  Skin/wound Issues: None    Code Status: Full Code    Pain control: fair    Nausea control: " fair    Abnormal labs/tests/findings requiring intervention: N/A      Family present during ED course? No   Family Comments/Social Situation comments: N/A    Tasks needing completion: None    Boris Elliott RN  [facility]-- [phone] [facility]  [phone] [facility]

## 2018-05-21 NOTE — PROGRESS NOTES
Surgery Progress Note  5/21/2018     Subjective:  IRISH overnight. Pain controlled. Mild nausea, no vomiting. Denies chest pain.     Objective:  Temp:  [96.6  F (35.9  C)-98.3  F (36.8  C)] 98.3  F (36.8  C)  Pulse:  [72-76] 76  Resp:  [16-18] 16  BP: (102-169)/(51-96) 105/51  SpO2:  [96 %-100 %] 96 %  I/O last 3 completed shifts:  In: 686 [I.V.:686]  Out: -     Gen: Awake, alert, NAD   Resp: NLB on RA  Abd: Soft, mild distension, diffusely tender to palpation   Ext: WWP    BMP  Recent Labs  Lab 05/20/18  1659      POTASSIUM 3.7   CHLORIDE 100   ALEX 8.3*   CO2 27   BUN 11   CR 0.64   *     CBC  Recent Labs  Lab 05/20/18  1659   WBC 6.1   RBC 3.88   HGB 11.4*   HCT 34.4*   MCV 89   MCH 29.4   MCHC 33.1   RDW 11.8        INR  Recent Labs  Lab 05/20/18  1659   INR 1.05      AST/ALT & Alk Phos  Recent Labs  Lab 05/20/18  1659   *   *   ALKPHOS 96     Bili  Recent Labs   Lab Test  05/20/18   1659   BILITOTAL  1.1   DBIL  0.7*     Lipase/Amlyase  Recent Labs  Lab 05/20/18  1659   LIPASE 196       A/P: 47 year old female with recent stress test concerning for RCA ischemia who presents with epigastric and RUQ abdominal pain, transaminitis, and U/S with GB inflammation and CBD dilation concerning for cholecystitis vs choledocholithiasis. MRCP results pending.     - Cardiology consulted for possible coronary cath today given abnormal stress test  - If patient undergoes stenting will consider perc cholecystostomy tube placement. Otherwise, anticipate lap ben in next 1-2 days pending cardiac workup.  - GI consulted    Neuro/Pain: Tylenol, IV dilaudid   CV:  Aspirin, angio and formal echo today   Pulm:  Stable, IS  FEN/GI:  NPO, mIVF LR @ 100 ml/hr  Heme/ID: Zosyn  PPx:  Not indicated  Activity:   Up as tolerated       Seen and discussed with chief resident Dr. Mccarty.     Keiry Rivera MD  PGY-1, General Surgery

## 2018-05-21 NOTE — PLAN OF CARE
Problem: Patient Care Overview  Goal: Plan of Care/Patient Progress Review  Outcome: Improving  VSS. Prn dilaudid given x1 for upper R abd pain. Prn tylenol given for headache. LR at 100ml/hr. NPO. Check list complete. Report given to rossy Guerrier left via cart for angio.  present. Will give report to 6C.    Report given to 6C nurse at 1545.

## 2018-05-21 NOTE — ED PROVIDER NOTES
"     Emergency Department Patient Sign-out       Brief HPI and ED course:  This is a 47 year old female signed out to me by Dr. Simeon .  See initial ED Provider note for details of the presentation. In brief, patient presenting with epigastric pain, found to have elevated aminotransferases. Labs otherwise unremarkable. GI consulted. Abdominal ultrasound pending.     Vitals:   Patient Vitals for the past 24 hrs:   BP Temp Temp src Pulse Resp SpO2 Height Weight   05/20/18 1900 129/75 - - - - 98 % - -   05/20/18 1830 124/79 - - - - - - -   05/20/18 1800 125/77 - - - - 98 % - -   05/20/18 1746 - - - - - 99 % - -   05/20/18 1745 129/79 - - - - - - -   05/20/18 1604 (!) 169/96 98.1  F (36.7  C) Oral 75 18 100 % 1.676 m (5' 6\") 68 kg (150 lb)       Received Sign-out Plan:    Pending studies include abdominal ultrasound.    - discuss ultrasound findings with GI fellow  - discuss dispo options with patient and finalize disposition    Events after assuming care:  After care was assumed, a focused history and physical was performed. Agree with findings relayed by previous provider.     Exam with RUQ and epigastric tenderness, no peritoneal signs. Ultrasound with enlarged CBD, thick GB wall, pericholecystic fluid - concerning for potential cholecystitis. Zosyn given.     Discussed case with GI and general surgery. After discussion with those services, plan MRCP and admission to surgery.     --  Jarred Hardin   Emergency Medicine           Jarred Hardin MD  05/20/18 9699    "

## 2018-05-22 ENCOUNTER — ANESTHESIA (OUTPATIENT)
Dept: SURGERY | Facility: CLINIC | Age: 48
DRG: 418 | End: 2018-05-22
Payer: COMMERCIAL

## 2018-05-22 LAB
ABO + RH BLD: NORMAL
ABO + RH BLD: NORMAL
ALBUMIN SERPL-MCNC: 2.9 G/DL (ref 3.4–5)
ALP SERPL-CCNC: 111 U/L (ref 40–150)
ALT SERPL W P-5'-P-CCNC: 325 U/L (ref 0–50)
AST SERPL W P-5'-P-CCNC: 188 U/L (ref 0–45)
BILIRUB DIRECT SERPL-MCNC: 0.1 MG/DL (ref 0–0.2)
BILIRUB SERPL-MCNC: 0.5 MG/DL (ref 0.2–1.3)
BLD GP AB SCN SERPL QL: NORMAL
BLOOD BANK CMNT PATIENT-IMP: NORMAL
GLUCOSE BLDC GLUCOMTR-MCNC: 82 MG/DL (ref 70–99)
PROT SERPL-MCNC: 6.2 G/DL (ref 6.8–8.8)
SPECIMEN EXP DATE BLD: NORMAL

## 2018-05-22 PROCEDURE — 80076 HEPATIC FUNCTION PANEL: CPT | Performed by: STUDENT IN AN ORGANIZED HEALTH CARE EDUCATION/TRAINING PROGRAM

## 2018-05-22 PROCEDURE — 25000128 H RX IP 250 OP 636: Performed by: STUDENT IN AN ORGANIZED HEALTH CARE EDUCATION/TRAINING PROGRAM

## 2018-05-22 PROCEDURE — 36000059 ZZH SURGERY LEVEL 3 EA 15 ADDTL MIN UMMC: Performed by: SURGERY

## 2018-05-22 PROCEDURE — 40000170 ZZH STATISTIC PRE-PROCEDURE ASSESSMENT II: Performed by: SURGERY

## 2018-05-22 PROCEDURE — 25000132 ZZH RX MED GY IP 250 OP 250 PS 637: Performed by: STUDENT IN AN ORGANIZED HEALTH CARE EDUCATION/TRAINING PROGRAM

## 2018-05-22 PROCEDURE — 0FT44ZZ RESECTION OF GALLBLADDER, PERCUTANEOUS ENDOSCOPIC APPROACH: ICD-10-PCS | Performed by: SURGERY

## 2018-05-22 PROCEDURE — 12000008 ZZH R&B INTERMEDIATE UMMC

## 2018-05-22 PROCEDURE — 25000125 ZZHC RX 250: Performed by: ANESTHESIOLOGY

## 2018-05-22 PROCEDURE — C9290 INJ, BUPIVACAINE LIPOSOME: HCPCS | Performed by: STUDENT IN AN ORGANIZED HEALTH CARE EDUCATION/TRAINING PROGRAM

## 2018-05-22 PROCEDURE — 25000128 H RX IP 250 OP 636: Performed by: NURSE ANESTHETIST, CERTIFIED REGISTERED

## 2018-05-22 PROCEDURE — 25000128 H RX IP 250 OP 636: Performed by: ANESTHESIOLOGY

## 2018-05-22 PROCEDURE — 37000009 ZZH ANESTHESIA TECHNICAL FEE, EACH ADDTL 15 MIN: Performed by: SURGERY

## 2018-05-22 PROCEDURE — 88304 TISSUE EXAM BY PATHOLOGIST: CPT | Performed by: SURGERY

## 2018-05-22 PROCEDURE — 00000146 ZZHCL STATISTIC GLUCOSE BY METER IP

## 2018-05-22 PROCEDURE — 40000065 ZZH STATISTIC EKG NON-CHARGEABLE

## 2018-05-22 PROCEDURE — 27210794 ZZH OR GENERAL SUPPLY STERILE: Performed by: SURGERY

## 2018-05-22 PROCEDURE — 71000015 ZZH RECOVERY PHASE 1 LEVEL 2 EA ADDTL HR: Performed by: SURGERY

## 2018-05-22 PROCEDURE — 93010 ELECTROCARDIOGRAM REPORT: CPT | Performed by: INTERNAL MEDICINE

## 2018-05-22 PROCEDURE — 25000565 ZZH ISOFLURANE, EA 15 MIN: Performed by: SURGERY

## 2018-05-22 PROCEDURE — 25000125 ZZHC RX 250: Performed by: STUDENT IN AN ORGANIZED HEALTH CARE EDUCATION/TRAINING PROGRAM

## 2018-05-22 PROCEDURE — 36415 COLL VENOUS BLD VENIPUNCTURE: CPT | Performed by: STUDENT IN AN ORGANIZED HEALTH CARE EDUCATION/TRAINING PROGRAM

## 2018-05-22 PROCEDURE — 25000125 ZZHC RX 250: Performed by: NURSE ANESTHETIST, CERTIFIED REGISTERED

## 2018-05-22 PROCEDURE — 71000014 ZZH RECOVERY PHASE 1 LEVEL 2 FIRST HR: Performed by: SURGERY

## 2018-05-22 PROCEDURE — C9399 UNCLASSIFIED DRUGS OR BIOLOG: HCPCS | Performed by: NURSE ANESTHETIST, CERTIFIED REGISTERED

## 2018-05-22 PROCEDURE — 37000008 ZZH ANESTHESIA TECHNICAL FEE, 1ST 30 MIN: Performed by: SURGERY

## 2018-05-22 PROCEDURE — 36000057 ZZH SURGERY LEVEL 3 1ST 30 MIN - UMMC: Performed by: SURGERY

## 2018-05-22 PROCEDURE — 25000132 ZZH RX MED GY IP 250 OP 250 PS 637: Performed by: INTERNAL MEDICINE

## 2018-05-22 RX ORDER — NALOXONE HYDROCHLORIDE 0.4 MG/ML
.1-.4 INJECTION, SOLUTION INTRAMUSCULAR; INTRAVENOUS; SUBCUTANEOUS
Status: DISCONTINUED | OUTPATIENT
Start: 2018-05-22 | End: 2018-05-22

## 2018-05-22 RX ORDER — BUPIVACAINE HYDROCHLORIDE AND EPINEPHRINE 2.5; 5 MG/ML; UG/ML
INJECTION, SOLUTION INFILTRATION; PERINEURAL PRN
Status: DISCONTINUED | OUTPATIENT
Start: 2018-05-22 | End: 2018-05-22

## 2018-05-22 RX ORDER — NALOXONE HYDROCHLORIDE 0.4 MG/ML
.1-.4 INJECTION, SOLUTION INTRAMUSCULAR; INTRAVENOUS; SUBCUTANEOUS
Status: DISCONTINUED | OUTPATIENT
Start: 2018-05-22 | End: 2018-05-22 | Stop reason: HOSPADM

## 2018-05-22 RX ORDER — CEFAZOLIN SODIUM 1 G/3ML
1 INJECTION, POWDER, FOR SOLUTION INTRAMUSCULAR; INTRAVENOUS SEE ADMIN INSTRUCTIONS
Status: DISCONTINUED | OUTPATIENT
Start: 2018-05-22 | End: 2018-05-22 | Stop reason: HOSPADM

## 2018-05-22 RX ORDER — PROPOFOL 10 MG/ML
INJECTION, EMULSION INTRAVENOUS PRN
Status: DISCONTINUED | OUTPATIENT
Start: 2018-05-22 | End: 2018-05-22

## 2018-05-22 RX ORDER — CEFAZOLIN SODIUM 2 G/100ML
2 INJECTION, SOLUTION INTRAVENOUS
Status: COMPLETED | OUTPATIENT
Start: 2018-05-22 | End: 2018-05-22

## 2018-05-22 RX ORDER — HYDROMORPHONE HYDROCHLORIDE 1 MG/ML
.3-.5 INJECTION, SOLUTION INTRAMUSCULAR; INTRAVENOUS; SUBCUTANEOUS EVERY 10 MIN PRN
Status: DISCONTINUED | OUTPATIENT
Start: 2018-05-22 | End: 2018-05-22 | Stop reason: HOSPADM

## 2018-05-22 RX ORDER — ONDANSETRON 2 MG/ML
4 INJECTION INTRAMUSCULAR; INTRAVENOUS EVERY 30 MIN PRN
Status: DISCONTINUED | OUTPATIENT
Start: 2018-05-22 | End: 2018-05-22 | Stop reason: HOSPADM

## 2018-05-22 RX ORDER — FLUMAZENIL 0.1 MG/ML
0.2 INJECTION, SOLUTION INTRAVENOUS
Status: DISCONTINUED | OUTPATIENT
Start: 2018-05-22 | End: 2018-05-22 | Stop reason: HOSPADM

## 2018-05-22 RX ORDER — DEXAMETHASONE SODIUM PHOSPHATE 4 MG/ML
INJECTION, SOLUTION INTRA-ARTICULAR; INTRALESIONAL; INTRAMUSCULAR; INTRAVENOUS; SOFT TISSUE PRN
Status: DISCONTINUED | OUTPATIENT
Start: 2018-05-22 | End: 2018-05-22

## 2018-05-22 RX ORDER — OXYCODONE HYDROCHLORIDE 5 MG/1
5-10 TABLET ORAL EVERY 4 HOURS PRN
Status: DISCONTINUED | OUTPATIENT
Start: 2018-05-22 | End: 2018-05-23 | Stop reason: HOSPADM

## 2018-05-22 RX ORDER — FENTANYL CITRATE 50 UG/ML
25-50 INJECTION, SOLUTION INTRAMUSCULAR; INTRAVENOUS EVERY 5 MIN PRN
Status: DISCONTINUED | OUTPATIENT
Start: 2018-05-22 | End: 2018-05-22

## 2018-05-22 RX ORDER — MEPERIDINE HYDROCHLORIDE 50 MG/ML
12.5 INJECTION INTRAMUSCULAR; INTRAVENOUS; SUBCUTANEOUS
Status: DISCONTINUED | OUTPATIENT
Start: 2018-05-22 | End: 2018-05-22 | Stop reason: HOSPADM

## 2018-05-22 RX ORDER — FENTANYL CITRATE 50 UG/ML
25-50 INJECTION, SOLUTION INTRAMUSCULAR; INTRAVENOUS
Status: DISCONTINUED | OUTPATIENT
Start: 2018-05-22 | End: 2018-05-22 | Stop reason: HOSPADM

## 2018-05-22 RX ORDER — ONDANSETRON 4 MG/1
4 TABLET, ORALLY DISINTEGRATING ORAL EVERY 30 MIN PRN
Status: DISCONTINUED | OUTPATIENT
Start: 2018-05-22 | End: 2018-05-22 | Stop reason: HOSPADM

## 2018-05-22 RX ORDER — SODIUM CHLORIDE, SODIUM LACTATE, POTASSIUM CHLORIDE, CALCIUM CHLORIDE 600; 310; 30; 20 MG/100ML; MG/100ML; MG/100ML; MG/100ML
INJECTION, SOLUTION INTRAVENOUS CONTINUOUS
Status: DISCONTINUED | OUTPATIENT
Start: 2018-05-22 | End: 2018-05-22 | Stop reason: HOSPADM

## 2018-05-22 RX ORDER — FENTANYL CITRATE 50 UG/ML
25-50 INJECTION, SOLUTION INTRAMUSCULAR; INTRAVENOUS
Status: DISCONTINUED | OUTPATIENT
Start: 2018-05-22 | End: 2018-05-22

## 2018-05-22 RX ORDER — SCOLOPAMINE TRANSDERMAL SYSTEM 1 MG/1
1 PATCH, EXTENDED RELEASE TRANSDERMAL
Status: DISCONTINUED | OUTPATIENT
Start: 2018-05-22 | End: 2018-05-22 | Stop reason: HOSPADM

## 2018-05-22 RX ORDER — SODIUM CHLORIDE, SODIUM LACTATE, POTASSIUM CHLORIDE, CALCIUM CHLORIDE 600; 310; 30; 20 MG/100ML; MG/100ML; MG/100ML; MG/100ML
INJECTION, SOLUTION INTRAVENOUS CONTINUOUS
Status: DISCONTINUED | OUTPATIENT
Start: 2018-05-22 | End: 2018-05-22

## 2018-05-22 RX ORDER — SODIUM CHLORIDE, SODIUM LACTATE, POTASSIUM CHLORIDE, CALCIUM CHLORIDE 600; 310; 30; 20 MG/100ML; MG/100ML; MG/100ML; MG/100ML
INJECTION, SOLUTION INTRAVENOUS CONTINUOUS PRN
Status: DISCONTINUED | OUTPATIENT
Start: 2018-05-22 | End: 2018-05-22

## 2018-05-22 RX ORDER — HYDROMORPHONE HYDROCHLORIDE 1 MG/ML
.3-.5 INJECTION, SOLUTION INTRAMUSCULAR; INTRAVENOUS; SUBCUTANEOUS EVERY 10 MIN PRN
Status: DISCONTINUED | OUTPATIENT
Start: 2018-05-22 | End: 2018-05-22

## 2018-05-22 RX ORDER — ONDANSETRON 2 MG/ML
INJECTION INTRAMUSCULAR; INTRAVENOUS PRN
Status: DISCONTINUED | OUTPATIENT
Start: 2018-05-22 | End: 2018-05-22

## 2018-05-22 RX ORDER — ACETAMINOPHEN 325 MG/1
975 TABLET ORAL ONCE
Status: COMPLETED | OUTPATIENT
Start: 2018-05-22 | End: 2018-05-22

## 2018-05-22 RX ORDER — PROPOFOL 10 MG/ML
INJECTION, EMULSION INTRAVENOUS CONTINUOUS PRN
Status: DISCONTINUED | OUTPATIENT
Start: 2018-05-22 | End: 2018-05-22

## 2018-05-22 RX ORDER — HYDROMORPHONE HYDROCHLORIDE 1 MG/ML
.2-.4 INJECTION, SOLUTION INTRAMUSCULAR; INTRAVENOUS; SUBCUTANEOUS
Status: DISCONTINUED | OUTPATIENT
Start: 2018-05-22 | End: 2018-05-23

## 2018-05-22 RX ORDER — LIDOCAINE HYDROCHLORIDE 20 MG/ML
INJECTION, SOLUTION INFILTRATION; PERINEURAL PRN
Status: DISCONTINUED | OUTPATIENT
Start: 2018-05-22 | End: 2018-05-22

## 2018-05-22 RX ADMIN — ACETAMINOPHEN 975 MG: 325 TABLET, FILM COATED ORAL at 14:21

## 2018-05-22 RX ADMIN — HYDROMORPHONE HYDROCHLORIDE 0.4 MG: 1 INJECTION, SOLUTION INTRAMUSCULAR; INTRAVENOUS; SUBCUTANEOUS at 20:19

## 2018-05-22 RX ADMIN — SUGAMMADEX 150 MG: 100 INJECTION, SOLUTION INTRAVENOUS at 17:07

## 2018-05-22 RX ADMIN — PROPOFOL 80 MG: 10 INJECTION, EMULSION INTRAVENOUS at 15:41

## 2018-05-22 RX ADMIN — ROCURONIUM BROMIDE 50 MG: 10 INJECTION INTRAVENOUS at 15:41

## 2018-05-22 RX ADMIN — PIPERACILLIN SODIUM AND TAZOBACTAM SODIUM 3.38 G: 3; .375 INJECTION, POWDER, LYOPHILIZED, FOR SOLUTION INTRAVENOUS at 11:28

## 2018-05-22 RX ADMIN — HYDROMORPHONE HYDROCHLORIDE 0.4 MG: 1 INJECTION, SOLUTION INTRAMUSCULAR; INTRAVENOUS; SUBCUTANEOUS at 19:04

## 2018-05-22 RX ADMIN — FENTANYL CITRATE 50 MCG: 50 INJECTION INTRAMUSCULAR; INTRAVENOUS at 18:03

## 2018-05-22 RX ADMIN — PROPOFOL 200 MCG/KG/MIN: 10 INJECTION, EMULSION INTRAVENOUS at 15:41

## 2018-05-22 RX ADMIN — HYDROMORPHONE HYDROCHLORIDE 0.4 MG: 1 INJECTION, SOLUTION INTRAMUSCULAR; INTRAVENOUS; SUBCUTANEOUS at 17:53

## 2018-05-22 RX ADMIN — HYDROMORPHONE HYDROCHLORIDE 0.5 MG: 1 INJECTION, SOLUTION INTRAMUSCULAR; INTRAVENOUS; SUBCUTANEOUS at 15:33

## 2018-05-22 RX ADMIN — HYDROMORPHONE HYDROCHLORIDE 0.5 MG: 1 INJECTION, SOLUTION INTRAMUSCULAR; INTRAVENOUS; SUBCUTANEOUS at 16:12

## 2018-05-22 RX ADMIN — OXYCODONE HYDROCHLORIDE 5 MG: 5 TABLET ORAL at 22:24

## 2018-05-22 RX ADMIN — PIPERACILLIN SODIUM AND TAZOBACTAM SODIUM 3.38 G: 3; .375 INJECTION, POWDER, LYOPHILIZED, FOR SOLUTION INTRAVENOUS at 05:04

## 2018-05-22 RX ADMIN — PIPERACILLIN SODIUM AND TAZOBACTAM SODIUM 3.38 G: 3; .375 INJECTION, POWDER, LYOPHILIZED, FOR SOLUTION INTRAVENOUS at 22:24

## 2018-05-22 RX ADMIN — BUPIVACAINE 20 ML: 13.3 INJECTION, SUSPENSION, LIPOSOMAL INFILTRATION at 12:51

## 2018-05-22 RX ADMIN — FENTANYL CITRATE 50 MCG: 50 INJECTION INTRAMUSCULAR; INTRAVENOUS at 18:15

## 2018-05-22 RX ADMIN — DEXAMETHASONE SODIUM PHOSPHATE 8 MG: 4 INJECTION, SOLUTION INTRA-ARTICULAR; INTRALESIONAL; INTRAMUSCULAR; INTRAVENOUS; SOFT TISSUE at 15:45

## 2018-05-22 RX ADMIN — ACETAMINOPHEN 650 MG: 325 TABLET, FILM COATED ORAL at 05:09

## 2018-05-22 RX ADMIN — HYDROMORPHONE HYDROCHLORIDE 0.5 MG: 1 INJECTION, SOLUTION INTRAMUSCULAR; INTRAVENOUS; SUBCUTANEOUS at 15:35

## 2018-05-22 RX ADMIN — MIDAZOLAM 2 MG: 1 INJECTION INTRAMUSCULAR; INTRAVENOUS at 15:26

## 2018-05-22 RX ADMIN — LIDOCAINE HYDROCHLORIDE 100 MG: 20 INJECTION, SOLUTION INFILTRATION; PERINEURAL at 15:41

## 2018-05-22 RX ADMIN — FENTANYL CITRATE 50 MCG: 50 INJECTION, SOLUTION INTRAMUSCULAR; INTRAVENOUS at 12:42

## 2018-05-22 RX ADMIN — ONDANSETRON 4 MG: 2 INJECTION INTRAMUSCULAR; INTRAVENOUS at 15:45

## 2018-05-22 RX ADMIN — SODIUM CHLORIDE, POTASSIUM CHLORIDE, SODIUM LACTATE AND CALCIUM CHLORIDE: 600; 310; 30; 20 INJECTION, SOLUTION INTRAVENOUS at 17:02

## 2018-05-22 RX ADMIN — MIDAZOLAM HYDROCHLORIDE 1 MG: 1 INJECTION, SOLUTION INTRAMUSCULAR; INTRAVENOUS at 12:42

## 2018-05-22 RX ADMIN — ONDANSETRON 4 MG: 2 INJECTION INTRAMUSCULAR; INTRAVENOUS at 17:20

## 2018-05-22 RX ADMIN — ASPIRIN 325 MG ORAL TABLET 325 MG: 325 PILL ORAL at 07:53

## 2018-05-22 RX ADMIN — SCOPOLAMINE 1 PATCH: 1 PATCH, EXTENDED RELEASE TRANSDERMAL at 14:15

## 2018-05-22 RX ADMIN — CEFAZOLIN SODIUM 2 G: 2 INJECTION, SOLUTION INTRAVENOUS at 15:30

## 2018-05-22 RX ADMIN — BUPIVACAINE HYDROCHLORIDE AND EPINEPHRINE BITARTRATE 20 ML: 2.5; .005 INJECTION, SOLUTION INFILTRATION; PERINEURAL at 12:51

## 2018-05-22 RX ADMIN — OXYCODONE HYDROCHLORIDE 5 MG: 5 TABLET ORAL at 21:41

## 2018-05-22 RX ADMIN — HYDROMORPHONE HYDROCHLORIDE 0.3 MG: 1 INJECTION, SOLUTION INTRAMUSCULAR; INTRAVENOUS; SUBCUTANEOUS at 17:37

## 2018-05-22 RX ADMIN — SODIUM CHLORIDE, POTASSIUM CHLORIDE, SODIUM LACTATE AND CALCIUM CHLORIDE: 600; 310; 30; 20 INJECTION, SOLUTION INTRAVENOUS at 15:30

## 2018-05-22 ASSESSMENT — PAIN DESCRIPTION - DESCRIPTORS: DESCRIPTORS: ACHING

## 2018-05-22 NOTE — PLAN OF CARE
Problem: Pain, Acute (Adult)  Goal: Identify Related Risk Factors and Signs and Symptoms  Related risk factors and signs and symptoms are identified upon initiation of Human Response Clinical Practice Guideline (CPG).   Outcome: No Change  AVSS. Tylenol admin x1 for pain control. Telemetry maintained: NSR. MIVF infused/iv abx admin as ordered. NPO/meds for OR today. Pre op checklist started. Up independently. Void spontaneous. Not saving urine. Pre op shower needs to be completed. Rested btwn cares. Continue to monitor.

## 2018-05-22 NOTE — ANESTHESIA CARE TRANSFER NOTE
Patient: Edwina Wynne    Procedure(s):  LAPAROSCOPIC CHOLECYSTECTOMY . - Wound Class: II-Clean Contaminated    Diagnosis: cholecystitis  Diagnosis Additional Information: No value filed.    Anesthesia Type:   General     Note:  Airway :Nasal Cannula  Patient transferred to:PACU  Comments: Pt c/o moderate amount of pain and nausea.  Zofran given.  RN to give pain medication.  Report to RN.Handoff Report: Identifed the Patient, Identified the Reponsible Provider, Reviewed the pertinent medical history, Discussed the surgical course, Reviewed Intra-OP anesthesia mangement and issues during anesthesia, Set expectations for post-procedure period and Allowed opportunity for questions and acknowledgement of understanding      Vitals: (Last set prior to Anesthesia Care Transfer)    CRNA VITALS  5/22/2018 1651 - 5/22/2018 1724      5/22/2018             Pulse: 70    SpO2: 100 %    Resp Rate (observed): (!)  2    Resp Rate (set): 10                Electronically Signed By: ADDIE Kamara CRNA  May 22, 2018  5:24 PM

## 2018-05-22 NOTE — OR NURSING
Both Dr. Reagan and Dr. Fowler notified of pt taking 325 of aspirin this AM.  No new orders received.

## 2018-05-22 NOTE — BRIEF OP NOTE
[facility], [place]    Brief Operative Note    Pre-operative diagnosis: Cholecystitis  Post-operative diagnosis Cholecystitis  Procedure: Procedure(s):  LAPAROSCOPIC CHOLECYSTECTOMY . - Wound Class: II-Clean Contaminated  Surgeon: Surgeon(s) and Role:     * Brandon Reagan MD - Primary     * Eli Mccarty MD - Resident - Assisting  Anesthesia: General   Estimated blood loss: 35 ml  Drains: None  Specimens:   ID Type Source Tests Collected by Time Destination   A : gallbladder Tissue Gallbladder and Contents SURGICAL PATHOLOGY EXAM Brandon Reagan MD 5/22/2018  4:58 PM      Findings:   Acutely inflamed gallbladder.  Complications: None.  Implants:  None.

## 2018-05-22 NOTE — PROGRESS NOTES
Patient seen this a.m.  Questions answered.  Reviewed with her plans for today.  Surgery team had been notified that she wanted no residents in her case.  When I brought up this issue with her she was ok with residents being part of the surgery.  I emphasized that I am always in the OR, and bear responsibility that the care she gets on our service is the same as anywhere else.  She also enquired about her umbilical hernia which is asymptomatic.  She has noticed it more as she has been losing weight(intentional with healthier lifestyle choices).  It is about 2-3 cm in size, and would in all likelilhood need mesh for adequte repair.  As she is having gall bladder surgery, mesh in this setting is not indicated.  She is also not interested in mesh at all.  If her hernia becomes symptomatic, need for surgery can be readdressed, but she is aware that mesh would be needed to minimize recurrence rate.    She is understanding of discussion and agreeable to proceed.

## 2018-05-22 NOTE — ANESTHESIA PREPROCEDURE EVALUATION
Anesthesia Evaluation     . Pt has had prior anesthetic. Type: General    History of anesthetic complications   - PONV        ROS/MED HX    ENT/Pulmonary:  - neg pulmonary ROS     Neurologic:  - neg neurologic ROS     Cardiovascular:     (+) hypertension----. : . . . :. . Previous cardiac testing date:results:date: results: date: results:Cath date: 05/22/2018 results:Negative          METS/Exercise Tolerance:     Hematologic:         Musculoskeletal:         GI/Hepatic:  - neg GI/hepatic ROS       Renal/Genitourinary:  - ROS Renal section negative       Endo:  - neg endo ROS       Psychiatric:  - neg psychiatric ROS       Infectious Disease:         Malignancy:      - no malignancy   Other:    - neg other ROS                 Physical Exam  Normal systems: cardiovascular, pulmonary and dental    Airway   Mallampati: II  TM distance: <3 FB  Neck ROM: full    Dental     Cardiovascular       Pulmonary                     Anesthesia Plan      History & Physical Review      ASA Status:  2 .    NPO Status:  > 6 hours    Plan for General with Intravenous induction. Maintenance will be Inhalation.    PONV prophylaxis:  Ondansetron (or other 5HT-3)  Additional equipment: Videolaryngoscope and 2nd IV      Postoperative Care  Postoperative pain management:  IV analgesics.      Consents  Anesthetic plan, risks, benefits and alternatives discussed with:  Patient..                          .

## 2018-05-22 NOTE — OR NURSING
Dilaudid administered before fentanyl per CRNA recommendation but pt having little to no pain relief. IV fentanyl administered with relief. Pt denies nausea and feels ready to transfer back to . Will continue to monitor.

## 2018-05-22 NOTE — PLAN OF CARE
D:Dr Keiry Rivera was notified about patient wanting the Attending to do the procedure and possible repair of the ventral hernia  A:She stated that will be address before the surgery  P:surgery planned tomorrow afternoon per Primary

## 2018-05-22 NOTE — OP NOTE
Procedure Date: 05/22/2018      DATE OF SURGERY:  05/22/2018      PREOPERATIVE DIAGNOSIS:  Acute cholecystitis.      POSTOPERATIVE DIAGNOSIS:  Acute cholecystitis.      PROCEDURE:  Laparoscopic cholecystectomy.      STAFF SURGEON:  Brandon Reagan MD      ASSISTANTS:   1.  Eli Mccarty, PGY5.   2.  Teo Ricardo, MS4.      FINDINGS:  Inflamed, edematous gallbladder with some bile staining and hemorrhagic ascites.      COMPLICATIONS:  None.      ESTIMATED BLOOD LOSS:  35 mL      INDICATIONS FOR PROCEDURE:  Edwina Wynne is a 47-year-old female who presented with acute abdominal pain.  She also had some chest pain previously and was to get a cardiac workup.  Prior to our operation, she underwent a coronary angiogram and an echocardiogram.  Coronary angiogram was significant for 0% stenosis of any of her cardiac vessels.  She was cleared for the operation.  The risks and benefits of the operation were discussed with her, to which she agreed.      PROCEDURE IN DETAIL:  The patient was brought to the operating room and placed supine on the operating table.  General endotracheal anesthesia was achieved.  The abdomen was then prepped and draped in the usual sterile fashion.  We placed a Veress in the left upper quadrant and insufflated the abdomen.  Using the Optiview trocar, we were able to place a 5 mm port and survey the abdomen.  There were no obvious injuries and there were no injuries or insufflation of the omentum.        We placed two 5 mm ports in the right upper quadrant and given that she had an umbilical hernia, we placed a 5 mm ports lateral to the umbilicus.  We began our dissection by retracting the body of the gallbladder cephalad and exposing the infundibulum.  We were able to safely dissect to expose our triangle of safety.  Of note, on medializing the artery, we did get into cystic arterial bleeding and we had to clip the cystic artery a little higher on the gallbladder than on the infundibulum.  We  continued our dissection and we were able to then dissect out the cystic duct.  We had the triangle of safety as we had dissected the gallbladder off a 3rd of the liver bed.  We triply clipped the cystic duct and divided between clips.  We then began to dissecting the gallbladder off the liver bed.  Of note, while dissecting the gallbladder off the liver bed, there was some bile spillage.  As we were dissecting the gallbladder off the liver bed, we did come across the posterior cystic artery which also began bleeding.  We were able to safely place 2 clips across this artery.  Of note, this was readily identified as a posterior cystic artery as it ran alongside the gallbladder on the posterior side and did not go into the liver and did go into the gallbladder.  We then dissected the gallbladder off the liver bed.  We upsized the medial right upper quadrant 5 mm port to a 12 mm port, placed the EndoCatch bag and we were able to remove the gallbladder from that port site.  We took another survey of the abdomen.  Hemostasis was achieved.  We irrigated copiously until the return ran clear.  We closed that 12 mm port using an 0 Vicryl on a PMI suture passer device.  We closed Tiny fascia using 3-0 Vicryl.  We closed skin using 4-0 Vicryl.  Steri-Strips were applied.  The patient tolerated the procedure well.  She was extubated and taken to the PACU for recovery.         BRANDON ARREAGA MD       As dictated by DAVIDSON CAMACHO MD      Attending attestation:  I was present for entirety of procedure.     D: 2018   T: 2018   MT: DW      Name:     MARTHA SANTOYO   MRN:      [id number]        Account:        [id number]   :      1970           Procedure Date: 2018      Document: [id number]       cc: Brandon Arreaga MD

## 2018-05-22 NOTE — PROGRESS NOTES
5/22/2018 Gastroenterology Brief Note    Chart reviewed  Cardiac cath unremarkable yesterday  LFT improving, again MRCP without choledocholithiasis    Planning for lap ben today with gen surgery service  Would recommend IOC if possible    Discussed with surgery resident    The inpatient gastroenterology service will sign off at this time. Please call or repost with questions or if status changes. Thank you for allowing us to participate in the care of this patient.    Above in collaboration with Dr. Krista Peñaloza PA-C  [facility]  Pager *[phone]

## 2018-05-23 VITALS
HEIGHT: 66 IN | DIASTOLIC BLOOD PRESSURE: 71 MMHG | OXYGEN SATURATION: 99 % | HEART RATE: 70 BPM | RESPIRATION RATE: 18 BRPM | WEIGHT: 153 LBS | SYSTOLIC BLOOD PRESSURE: 126 MMHG | BODY MASS INDEX: 24.59 KG/M2 | TEMPERATURE: 98.5 F

## 2018-05-23 LAB
ALBUMIN SERPL-MCNC: 3.1 G/DL (ref 3.4–5)
ALP SERPL-CCNC: 99 U/L (ref 40–150)
ALT SERPL W P-5'-P-CCNC: 213 U/L (ref 0–50)
AST SERPL W P-5'-P-CCNC: 90 U/L (ref 0–45)
BILIRUB DIRECT SERPL-MCNC: <0.1 MG/DL (ref 0–0.2)
BILIRUB SERPL-MCNC: 0.4 MG/DL (ref 0.2–1.3)
PROT SERPL-MCNC: 6.2 G/DL (ref 6.8–8.8)

## 2018-05-23 PROCEDURE — 25000132 ZZH RX MED GY IP 250 OP 250 PS 637: Performed by: STUDENT IN AN ORGANIZED HEALTH CARE EDUCATION/TRAINING PROGRAM

## 2018-05-23 PROCEDURE — 25000128 H RX IP 250 OP 636: Performed by: STUDENT IN AN ORGANIZED HEALTH CARE EDUCATION/TRAINING PROGRAM

## 2018-05-23 PROCEDURE — 36415 COLL VENOUS BLD VENIPUNCTURE: CPT | Performed by: STUDENT IN AN ORGANIZED HEALTH CARE EDUCATION/TRAINING PROGRAM

## 2018-05-23 PROCEDURE — 25000132 ZZH RX MED GY IP 250 OP 250 PS 637: Performed by: INTERNAL MEDICINE

## 2018-05-23 PROCEDURE — 80076 HEPATIC FUNCTION PANEL: CPT | Performed by: STUDENT IN AN ORGANIZED HEALTH CARE EDUCATION/TRAINING PROGRAM

## 2018-05-23 PROCEDURE — 99231 SBSQ HOSP IP/OBS SF/LOW 25: CPT | Performed by: NURSE PRACTITIONER

## 2018-05-23 RX ORDER — ASPIRIN 325 MG
325 TABLET ORAL DAILY
Qty: 30 TABLET | Refills: 1 | Status: SHIPPED | OUTPATIENT
Start: 2018-05-24 | End: 2018-05-23

## 2018-05-23 RX ORDER — OXYCODONE HYDROCHLORIDE 5 MG/1
5-10 TABLET ORAL EVERY 4 HOURS PRN
Qty: 15 TABLET | Refills: 0 | Status: SHIPPED | OUTPATIENT
Start: 2018-05-23 | End: 2018-05-23

## 2018-05-23 RX ORDER — OXYCODONE HYDROCHLORIDE 5 MG/1
5-10 TABLET ORAL EVERY 6 HOURS PRN
Qty: 15 TABLET | Refills: 0 | Status: SHIPPED | OUTPATIENT
Start: 2018-05-23 | End: 2019-09-23

## 2018-05-23 RX ADMIN — HYDROMORPHONE HYDROCHLORIDE 0.4 MG: 1 INJECTION, SOLUTION INTRAMUSCULAR; INTRAVENOUS; SUBCUTANEOUS at 00:05

## 2018-05-23 RX ADMIN — OXYCODONE HYDROCHLORIDE 5 MG: 5 TABLET ORAL at 04:39

## 2018-05-23 RX ADMIN — PIPERACILLIN SODIUM AND TAZOBACTAM SODIUM 3.38 G: 3; .375 INJECTION, POWDER, LYOPHILIZED, FOR SOLUTION INTRAVENOUS at 04:39

## 2018-05-23 RX ADMIN — OXYCODONE HYDROCHLORIDE 10 MG: 5 TABLET ORAL at 08:43

## 2018-05-23 RX ADMIN — HYDROMORPHONE HYDROCHLORIDE 0.4 MG: 1 INJECTION, SOLUTION INTRAMUSCULAR; INTRAVENOUS; SUBCUTANEOUS at 02:17

## 2018-05-23 RX ADMIN — ACETAMINOPHEN 650 MG: 325 TABLET, FILM COATED ORAL at 11:57

## 2018-05-23 RX ADMIN — ACETAMINOPHEN 650 MG: 325 TABLET, FILM COATED ORAL at 07:45

## 2018-05-23 RX ADMIN — ASPIRIN 325 MG ORAL TABLET 325 MG: 325 PILL ORAL at 07:45

## 2018-05-23 NOTE — ANESTHESIA POSTPROCEDURE EVALUATION
Patient: Edwina Wynne    Procedure(s):  LAPAROSCOPIC CHOLECYSTECTOMY . - Wound Class: II-Clean Contaminated    Diagnosis:cholecystitis  Diagnosis Additional Information: No value filed.    Anesthesia Type:  General    Note:  Anesthesia Post Evaluation    Patient location during evaluation: PACU  Patient participation: Able to fully participate in evaluation  Level of consciousness: awake and alert  Pain management: adequate  Airway patency: patent  Cardiovascular status: acceptable  Respiratory status: acceptable  Hydration status: acceptable  PONV: none     Anesthetic complications: None          Last vitals:  Vitals:    05/22/18 1815 05/22/18 1830 05/22/18 1853   BP: 141/79 142/78 138/84   Pulse:      Resp: 16 16 17   Temp: 36.8  C (98.2  F) 36.8  C (98.3  F) 36.8  C (98.3  F)   SpO2: 100% 100%          Electronically Signed By: Sukhjinder Leon MD  May 22, 2018  7:10 PM

## 2018-05-23 NOTE — PLAN OF CARE
Problem: Patient Care Overview  Goal: Discharge Needs Assessment  Outcome: Adequate for Discharge Date Met: 05/23/18  Vitals:    05/23/18 0405 05/23/18 0734 05/23/18 0843 05/23/18 1300   BP: 106/48 118/57  126/71   BP Location: Left arm Left arm  Left arm   Pulse:       Resp: 19 16 18   Temp: 98.4  F (36.9  C) 97.9  F (36.6  C)  98.5  F (36.9  C)   TempSrc: Oral Oral  Oral   SpO2: 97% 99% 96% 99%   Weight:       Height:         Discharge instruction given. PIV remove prior to dc. Family here to pick her up & pt left with her family.

## 2018-05-23 NOTE — PROGRESS NOTES
D: s/p lap/ben    I: Monitored vitals and assessed pt status.   Changed: lap sites x4   Running: n/a  PRN: dilaudid 0.4mg qhour & oxy q4h    A: A0x4. VSS. Afebrile. Urinating adequately. Lap sites CDI with scant drainage on 1 dressing - unchanged. C/o 5/10 lower rib pain - Dilaudid given. Regular diet. Family present & supportive.     P: Continue to monitor Pt status and report changes to treatment team.    Temp:  [98.2  F (36.8  C)-98.8  F (37.1  C)] 98.3  F (36.8  C)  Pulse:  [70] 70  Heart Rate:  [54-83] 65  Resp:  [13-20] 17  BP: (114-156)/(74-94) 138/84  SpO2:  [95 %-100 %] 100 %

## 2018-05-23 NOTE — PROGRESS NOTES
Surgery Progress Note  5/23/2018     Subjective:  IRISH overnight. Pain controlled. Denies N/V. Passing flatus, no BM.    Objective:  Temp:  [97.9  F (36.6  C)-98.9  F (37.2  C)] 97.9  F (36.6  C)  Heart Rate:  [54-75] 56  Resp:  [13-22] 16  BP: (106-156)/(48-93) 118/57  SpO2:  [96 %-100 %] 96 %  I/O last 3 completed shifts:  In: 1450 [P.O.:50; I.V.:1400]  Out: 35 [Blood:35]    Gen: Awake, alert, NAD  Resp: NLB on 2L NC  Abd: Soft, ND, ATTP  Ext: WWP  Dressing/Incision: C/d/i     BMP    Recent Labs  Lab 05/21/18  0704 05/20/18  1659    133   POTASSIUM 3.6 3.7   CHLORIDE 108 100   ALEX 8.1* 8.3*   CO2 24 27   BUN 7 11   CR 0.58 0.64   GLC 99 106*   MAG 2.2  --    PHOS 3.2  --      CBC    Recent Labs  Lab 05/21/18  0704 05/20/18  1659   WBC 4.7 6.1   RBC 3.77* 3.88   HGB 11.1* 11.4*   HCT 33.8* 34.4*   MCV 90 89   MCH 29.4 29.4   MCHC 32.8 33.1   RDW 12.1 11.8    223     INR    Recent Labs  Lab 05/21/18  0704 05/20/18  1659   INR 1.03 1.05      AST/ALT & Alk Phos    Recent Labs  Lab 05/23/18  0802 05/22/18  0644 05/21/18  0704 05/20/18  1659   AST 90* 188* 645* 548*   * 325* 516* 310*   ALKPHOS 99 111 125 96     Bili  Recent Labs   Lab Test  05/22/18   0644  05/21/18   0704  05/20/18   1659   BILITOTAL  0.5  0.8  1.1   DBIL  0.1  0.3*  0.7*     Lipase/Amlyase    Recent Labs  Lab 05/20/18  1659   LIPASE 196       A/P: 47 year old female with recent stress test concerning for RCA ischemia who presents with epigastric and RUQ abdominal pain, transaminitis, and U/S with GB inflammation and CBD dilation concerning for cholecystitis vs choledocholithiasis. Cardiac workup negative. Currently POD #1 lap cholecystectomy, uncomplicated post-op course, tolerating regular diet, LFTs trending down    Neuro/Pain: Tylenol, oxycodone prn, IV dilaudid prn   CV:  ASA  Pulm:  Wean O2, IS  FEN/GI:  Regular diet  Heme/ID: Discontinue zosyn   PPx:  Not indicated   Activity:   Up as tolerated     Dispo: possible d/c today  "if tolerating diet and pain controlled with PO meds.    Seen and discussed with chief resident Dr. Mccarty.     Laya Foster. MS4    Patient seen. Interval history and examination performed by me     No issues overnight, ambulating, tolerating a regular diet     /57 (BP Location: Left arm)  Pulse 70  Temp 97.9  F (36.6  C) (Oral)  Resp 16  Ht 1.676 m (5' 6\")  Wt 69.4 kg (153 lb)  LMP 05/20/2018  SpO2 96%  BMI 24.69 kg/m2    NAD  NLB  Abd soft, appropriate, incisions clean and dry   Ext wwp     Regular diet, home today     Discussed with staff    Eli Mccarty MD  Surgery Resident  [phone]    "

## 2018-05-23 NOTE — DISCHARGE SUMMARY
"General Surgery Discharge Summary    Edwina Wynne MRN# [id number]   YOB: 1970 Age: 47 year old     Date of Admission:  5/20/2018  Date of Discharge::  5/23/2018  Admitting Physician:  Brandon Reagan MD  Discharge Physician:  Brandon Reagan MD  Primary Care Physician:         Blanca Heard          Admission Diagnoses:   Abdominal pain, right upper quadrant [R10.11]  Transaminitis [R74.0]  Acute cholecystitis [K81.0]  Recent abnormal stress test          Discharge Diagnosis:   Acute cholecystitis  S/p laparoscopic cholecystectomy          Procedures:   Laparoscopic cholecystectomy by Dr. Reagan on 5/22/18        Non-operative procedures:   Cardiac catheterization by Dr. Gerardo on 5/21/18          Consultations:   SURGERY GENERAL ADULT IP CONSULT  CARDIOLOGY GENERAL ADULT IP CONSULT  GI PANCREATICOBILIARY ADULT IP CONSULT           Medications Prior to Admission:     No prescriptions prior to admission.            Discharge Medications:      Edwina Wynnealphonserichi   Home Medication Instructions BASHIR:[id number]    Printed on:05/23/18 0854   Medication Information                      Calcium Carbonate-Vitamin D (CALCIUM + D PO)  Take 1 tablet by mouth daily.             clobetasol (TEMOVATE) 0.05 % external solution               IBUPROFEN PO  Take 600 mg by mouth every 4 hours as needed for moderate pain             ketoconazole (NIZORAL) 2 % shampoo               tretinoin (RETIN-A) 0.025 % cream  Use every night                       Day of Discharge Exam   /71 (BP Location: Left arm)  Pulse 70  Temp 98.5  F (36.9  C) (Oral)  Resp 18  Ht 1.676 m (5' 6\")  Wt 69.4 kg (153 lb)  LMP 05/20/2018  SpO2 99%  BMI 24.69 kg/m2    General:  A&Ox3, NAD  Cardio:   RRR  Chest:   Non labored breathing on RA  Abd:   Soft, non-distended, appropriately TTP, incision c/d/i  Ext:   WWP          Brief History of Illness:   Edwina Kat " Ricci is a 46 yo F who presents with 1 day of RUQ abdominal pain. Pain is constant, worse with meals, and associated with nausea. RUQ ultrasound findings concerning for cholecystitis and dilated CBD. She also had transaminitis but normal bilirubin and no leukocytosis. Of note, 3 weeks ago she had stress test for chest pain workup that was concerning for RCA ischemia. Currently does not have chest pain or SOB.            Hospital Course:   The patient was admitted and had a negative pre-op cardiac workup with an ECHO showing EF 60-65% without regional wall motion abnormalities and a cardiac catheterization with normal coronary blood flow. She subsequent underwent the above procedure. The patient tolerated the procedure well. There were no complications. Postoperatively the patient was transferred to the general floor for further care. The patient's diet was advanced as bowel function returned. Pain was controlled with oral pain medication and the patient was able to ambulate and void without difficulty. LFTs down trending and wnl. The patient received appropriate education post operatively. On POD #1 the patient was discharged to home with appropriate instructions and follow up. The patient acknowledged understanding and were in agreement with the plan.         Antibiotics Prescribed at Discharge:   None prescribed         Imaging Studies:     Results for orders placed or performed during the hospital encounter of 05/20/18   US Abdomen Complete w Doppler Complete    Narrative    EXAMINATION: US ABDOMEN COMPLETE WITH DOPPLER, 5/20/2018 7:14 PM     COMPARISON: None available    HISTORY: LFT elevation    TECHNIQUE: The abdomen was scanned in standard fashion with  specialized ultrasound transducer(s) using both gray-scale, color  Doppler, and spectral flow techniques.    Findings:  Liver: The liver demonstrates normal homogeneous echotexture. No  evidence of a focal hepatic mass. Liver is enlarged measuring 23 cm  in  length.    Extrahepatic portal vein flow is antegrade, measuring 19 cm/sec.  Right portal vein flow is antegrade, measuring 27 cm/sec.  Left portal vein flow is antegrade, measuring 15 cm/sec.    Flow in the hepatic artery is towards the liver and:  124 cm/sec peak systolic  0.61 resistive index.     The splenic vein is patent and flow is towards the liver.  The left,  middle, and right hepatic veins are patent with flow towards the IVC.  The IVC is patent with flow towards the heart.   The visualized aorta  is not dilated.    Gallbladder: There is gallbladder wall thickening and pericholecystic  fluid. Echogenic stone within the gallbladder. Negative sonographic  Hernandez sign.    Bile Ducts: No intrahepatic biliary ductal dilatation. The common bile  duct measures 8.7 mm in diameter near the pancreatic head and 5 mm  closer to the liver.    Pancreas: Visualized portions of the head and body of the pancreas are  unremarkable.     Kidneys: Both kidneys are of normal echotexture, without mass or  hydronephrosis.  The craniocaudal dimensions are: right- 10.7 cm,  left- 11.6 cm.    Spleen: The spleen measures 9.9 cm in sagittal dimension.    Fluid: No evidence of ascites or pleural effusions.        Impression    Impression:   1.  Mild hepatomegaly with otherwise normal Doppler evaluation of the  liver.  2.  Cholelithiasis, pericholecystic fluid, and gallbladder wall  thickening. Additionally there is increased diameter of the common  bile duct. Otherwise, negative sonographic Hernandez sign. Findings are  equivocal for cholecystitis. If clinically indicated, further  evaluation could be performed with HIDA scan.      Findings of gallbladder wall thickening and pericholecystic fluid with  cholelithiasis were discussed with Dr. Hardin by telephone at  5/20/2018 7:35 PM by Dr. Younger.     I have personally reviewed the examination and initial interpretation  and I agree with the findings.    JESSIE ALVES MD   MR  Abdomen MRCP w/o & w Contrast    Narrative    MRCP Without and With Contrast    CLINICAL HISTORY: eval cholecystitis;     DATE: 5/21/2018 12:27 AM    TECHNIQUE:     Images were acquired with and without intravenous gadolinium contrast  through the upper abdomen. The following MR images were acquired  without intravenous contrast: TrueFISP, multiplanar T2-weighted, axial  T1 in/out of phase, T2-weighted MRCP images, axial diffusion-weighted  and axial apparent diffusion coefficient. T1-weighted images were  obtained before contrast at the multiple time points following  contrast injection. 3-D reformatted images were generated by the  technologist. Contrast dose: 7.5 ml Gadavist injected    Comparison study: Ultrasound abdomen 5/20/2018    FINDINGS:    Biliary Tree: The common bile duct is mildly dilated up to 9 mm. There  is mild intrahepatic biliary ductal dilatation. No filling defect or  mass.    Pancreas: Normal.    Liver: No focal lesions. Noncirrhotic morphology. No steatosis. There  is mild heterogeneous arterial enhancement suggesting mild diffuse  inflammation. Additionally, there is mild periportal edema. A small  nonspecific area of increased T2 signal is seen in segment 7 (series  27 image 30) possibly representing a small amount of inflammation and  perihepatic fluid.    Gallbladder: There is pericholecystic fluid, with substantial  thickening of the gallbladder wall. There is a 1.4 x 1.3 cm gallstone  in the gallbladder neck.    Spleen: Normal.    Kidneys: Normal.    Adrenal glands: Normal.    Bowel: No bowel obstruction. Visualized portions of the stomach and  duodenum are normal. Mild gastric distention.    Lymph nodes: Several prominent lymph nodes in the ana luisa hepatis  measuring up to 11 mm (series 20, image 25).    Blood vessels: Normal    Lung bases: Clear.    Bones and soft tissues: Bilateral breast implants are intact.      Mesentery and abdominal wall: Normal.    Ascites: Mild amount of  perisplenic fluid.      Impression    IMPRESSION:   1. Substantial pericholecystic fluid and gallbladder wall thickening.  Stone in the gallbladder neck. Findings raise suspicion for  cholecystitis.  2. Mild extrahepatic and intrahepatic biliary ductal dilatation. No  common bile duct stone.  3. Periportal edema and heterogeneous enhancement indicating some  degree of diffuse hepatic inflammation.    I have personally reviewed the examination and initial interpretation  and I agree with the findings.    SHMUEL JAMA MD            Final Pathology Result:   Pending at time of discharge         Discharge Instructions:     - No lifting greater than 10 pounds for 6 weeks after surgery.   - You may shower and get incisions wet starting 48 hrs after surgery but do not scrub incisions or submerge wounds (aka, bath, pool, hot tub, ect.) for 2 weeks. Remove outer dressing (if placed) after 48 hrs from surgery. If dermabond was used, avoid applying any lotions or ointments. If steri-strips were used, they will fall off on their own. You may leave open to air or cover with dry gauze if needed for comfort.  - No driving while taking narcotic pain medications.   - If you develop constipation, take stool softeners such as colace or miralax but stop if you develop diarrhea. Wean yourself off of narcotics.   - Call your primary provider to touch base with them regarding your recent admission.   - If you develop any fever/chills, worsening pain, redness, swelling, or drainage from your wound please call (Clinic [phone]).          Follow-Up:   - Follow up with PCP regarding hospitalization for acute cholecystitis and negative cardiac workup    - Follow up with Dr. Reagan in clinic in 2-3 week(s) after discharge. You should be called to make an appointment within 3 business days. If you are not contacted, call [phone] to make an appointment        Home Health Care:   Not needed           Discharge Disposition:    Discharged to home      Condition at discharge: Stable

## 2018-05-23 NOTE — PROGRESS NOTES
Pt transferred back to unit via stretcher. Pt accompanied by  and transport. VSS on 1 L NC for comfort. C/o 5/10 lower rib pain - Dilaudid given. 4 incision sites on abd. - CDI - 1 with scant drainage marked & unchanged. Pt eating small dinner in room with family.

## 2018-05-23 NOTE — PLAN OF CARE
Problem: Pain, Acute (Adult)  Goal: Identify Related Risk Factors and Signs and Symptoms  Related risk factors and signs and symptoms are identified upon initiation of Human Response Clinical Practice Guideline (CPG).   Outcome: No Change  Pt is transferred from 6 C .Lap site intact.Capnography intact.pt is standby assist.On 2 L oxygen.Will monitor.

## 2018-05-23 NOTE — PLAN OF CARE
Problem: Patient Care Overview  Goal: Plan of Care/Patient Progress Review  Outcome: Improving  Afeb, vitals stable, 02 sats 99-97% on 2LPM/nc, c/o abd pain, dilaudid iv x2 and oxycodone po x1 with relief, abd lap drsg dry and intact, RLQ drsg with small amt of dried drng, belly round with positive bowel sounds and gas, up to bathroom per self, voiding, not saving, iv at tko with iv abx given as ordered, lungs clear, on reg diet, offers no further c/o, possible dc later today

## 2018-05-23 NOTE — PROGRESS NOTES
"REGIONAL ANESTHESIA PAIN SERVICE  SUBJECTIVE  Interval history: Patient reports nerve block injections helps with acetaminophen and PRN oxycodone to control pain.  Currently reports pain is manageable with acetaminophen PRN.  Patient tolerating regular diet,  denies nausea.  Denies any weakness, paresthesias, circumoral numbness, metallic taste or tinnitus, voiding without difficulty. Plans for discharge home soon.      Medications related to Pain Management (Future)    Start     Dose/Rate Route Frequency Ordered Stop    05/23/18 0000  oxyCODONE IR (ROXICODONE) 5 MG tablet      5-10 mg Oral EVERY 6 HOURS PRN 05/23/18 1218      05/22/18 1243  bupivacaine liposome (EXPAREL) LONG ACTING injection was administered into the infiltration site to produce postsurgical analgesia. Duration of action is up to 72 hours, and other \"rhina\" medications should not be given for 96 hours with the exception of the lidocaine 5% patch (LIDODERM) and the lidocaine 10mg in potassium infusions. This entry is for INFORMATION ONLY.       Does not apply CONTINUOUS PRN 05/22/18 1243 05/26/18 1242    05/22/18 0557  oxyCODONE IR (ROXICODONE) tablet 5-10 mg      5-10 mg Oral EVERY 4 HOURS PRN 05/22/18 0557      05/21/18 0800  aspirin tablet 325 mg      325 mg Oral DAILY 05/21/18 0005      05/21/18 0005  lidocaine 1 % 1 mL      1 mL Other EVERY 1 HOUR PRN 05/21/18 0005      05/21/18 0005  lidocaine (LMX4) kit       Topical EVERY 1 HOUR PRN 05/21/18 0005      05/21/18 0005  acetaminophen (TYLENOL) tablet 650 mg      650 mg Oral EVERY 4 HOURS PRN 05/21/18 0005            OBJECTIVE:    /71 (BP Location: Left arm)  Pulse 70  Temp 98.5  F (36.9  C) (Oral)  Resp 18  Ht 1.676 m (5' 6\")  Wt 69.4 kg (153 lb)  LMP 05/20/2018  SpO2 99%  BMI 24.69 kg/m2  Exam:  General: alert and no distress  Neuro: Strength 5/5 BLE    ASSESSMENT/PLAN:    Edwina Wynne is a 47 year old female with cholecystitis, now POD #1 s/p  LAPAROSCOPIC " CHOLECYSTECTOMY with single shot injection bilateral transversus abdominis plane (TAP) nerve block.  Total bupivacaine 0.25% with epinephrine 1:200,000 20 mL and liposomal (long-acting) bupivacaine (Exparel) 1.3% 20 mL administered 5/22/18 for postop pain control.  Pt is ambulating without difficulty.  No weakness or paresthesias.  No evidence of adverse side effects associated with nerve block injections.  Pt acheiving adequate pain control, with nerve block and oral analgesics.  Anticipate 48-72 hours of pain control with long-acting local anesthetic. Additionally, pt will continue to require multimodal analgesia for visceral/muscle/musculoskeletal pain not controlled with long-acting local anesthetic.      - NO other local anesthetic use within 96 hours of liposomal bupivacaine (Exparel), unless approved by [facility]  - patient received verbal and written instructions about liposomal bupivacaine and counseling about pharmacologic and nonpharmacologic measures for acute postoperative pain management  - please call [facility] if questions or concerns    ADDIE Echevarria [facility]  Regional Anesthesia Pain Service ([facility])  5/23/2018 2:10 PM    [facility] Contact Info (for in-house use only):  Job code ID: [id number]   [employer] 0599  [facility] 0602  [employer] phone: dial [phone], enter jobcode ID, then enter call-back number.    Text: Use EyeEm on the Intranet <Paging/Directory> tab and enter Jobcode ID.   If no call back at any time, contact the hospital  and ask for [facility] attending or backup

## 2018-05-23 NOTE — PROGRESS NOTES
Transfer note  Transferred to: [facility] at 2230  Via: wheelchair  Reason for transfer: Improving patient condition, no telemetry orders.  Family: Present during transfer  Belongings: Sent with pt  Chart: Sent with pt  Medications: Meds from bin sent with pt  Report called to: [facility] RN    Patient s/p laparoscopic cholecystectomy. Vital signs stable on 2L NC. Capnography in place. Lap sites intact. Pain managed with PRN dilaudid and oxycodone. Up with [facility].

## 2018-05-24 ENCOUNTER — CARE COORDINATION (OUTPATIENT)
Dept: SURGERY | Facility: CLINIC | Age: 48
End: 2018-05-24

## 2018-05-24 LAB — INTERPRETATION ECG - MUSE: NORMAL

## 2018-05-24 NOTE — PROGRESS NOTES
RN Post-Op/Post-Discharge Care Coordination Note    Ms. Edwina Wynne is a 47 year old female who underwent Laparoscopic cholecystectomy on 5/22 with  Dr. Brandon Reagan.  Spoke with Patient.    Support  Patient able to care for self independently     Health Status  Fevers/chills: Patient denies any fever or chills.  Nausea/Vomiting: Patient denies nausea/vomiting.  Eating/drinking: Patient is able to eat and drink without any complaints.  Bowel habits: Patient reports no bowel movement since surgery. She states she is feeling bloated but has been passing gas. She took a dose of Miralax yesterday and is planning to repeat the dose today. Discussed using a stool softener if needed.  Drains (CECE): N/A  Incisions: Patient denies any signs and symptoms of infection..  Wound closure:  Steri-strips. She will plan on removing the dressings today and will shower after.  Pain: she used Oxycodone last night and has been using Tylenol today. Discussed she can alternate between Tylenol and Ibuprofen Q6H prn. She may also switch to heat this afternoon to help with discomfort (using a cloth barrier).  New Medications:  Oxycodone    Activity/Restrictions  No lifting in excess of 15-20 pounds for 3-4 weeks    Equipment  None    Pathology reviewed with patient:  No: pending    All of her questions were answered including reviewing restrictions, and wound care.  She will call this office if she has any further questions and/or concerns.      She is scheduled for f/u with Dr. Reagan 6/15 at 8:30.    Whom and When to Call  Patient acknowledges understanding of how to manage any medication changes and   when to seek medical care.     Patient advised that if after hour medical concerns arise to please call [phone] and choose option 4 to speak to the physician on call.     A copy of this note was routed to the primary surgeon.

## 2018-05-25 LAB — COPATH REPORT: NORMAL

## 2018-06-01 ENCOUNTER — HOSPITAL ENCOUNTER (EMERGENCY)
Facility: CLINIC | Age: 48
Discharge: HOME | End: 2018-06-01
Attending: INTERNAL MEDICINE | Admitting: INTERNAL MEDICINE
Payer: COMMERCIAL

## 2018-06-01 VITALS
OXYGEN SATURATION: 99 % | WEIGHT: 155.8 LBS | SYSTOLIC BLOOD PRESSURE: 141 MMHG | HEART RATE: 87 BPM | HEIGHT: 66 IN | DIASTOLIC BLOOD PRESSURE: 66 MMHG | TEMPERATURE: 98.1 F | RESPIRATION RATE: 16 BRPM | BODY MASS INDEX: 25.04 KG/M2

## 2018-06-01 DIAGNOSIS — K91.89 OTHER POSTPROCEDURAL COMPLICATIONS AND DISORDERS OF DIGESTIVE SYSTEM: ICD-10-CM

## 2018-06-01 DIAGNOSIS — T14.8XXA WOUND DRAINAGE: ICD-10-CM

## 2018-06-01 PROCEDURE — 99282 EMERGENCY DEPT VISIT SF MDM: CPT | Mod: Z6 | Performed by: INTERNAL MEDICINE

## 2018-06-01 PROCEDURE — 99282 EMERGENCY DEPT VISIT SF MDM: CPT | Performed by: INTERNAL MEDICINE

## 2018-06-01 ASSESSMENT — ENCOUNTER SYMPTOMS
CONFUSION: 0
WOUND: 1
CHILLS: 0
WHEEZING: 0
DYSURIA: 0
ABDOMINAL PAIN: 0
COUGH: 0
SHORTNESS OF BREATH: 0
WEAKNESS: 0
FEVER: 0
VOMITING: 0
NAUSEA: 0
BACK PAIN: 0
NUMBNESS: 0

## 2018-06-01 NOTE — ED AVS SNAPSHOT
[facility], Emergency Department    [address] [facility] [address] [facility] [address]    Phone:  [phone]                                       Edwina Wynne   MRN: [id number]    Department:  [facility], Emergency Department   Date of Visit:  6/1/2018           Patient Information     Date Of Birth          1970        Your diagnoses for this visit were:     Wound drainage        You were seen by Juan Haley MD.      Follow-up Information     Follow up with Blanca Heard MD.    Specialty:  Internal Medicine    Contact information:    [facility]  [address]  [facility] [address] [phone]          Discharge Instructions       Leave the right lower wound open. Dress with bacitracin and dry gauze.  Return for fever, increasing redness, pain or drainage.      Your next 10 appointments already scheduled     Solitario 15, 2018  8:30 AM CDT   (Arrive by 8:15 AM)   Post-Op with Brandon Reagan MD   [facility] ([facility] [place] and [facility] [place])    [address]  [address] [facility] [address] [phone]              24 Hour Appointment Hotline       To make an appointment at any [facility], call [phone] ([phone]). If you don't have a family doctor or clinic, we will help you find one. [facility] clinics are conveniently located to serve the needs of you and your family.             Review of your medicines      Our records show that you are taking the medicines listed below. If these are incorrect, please call your family doctor or clinic.        Dose / Directions Last dose taken    CALCIUM + D PO   Dose:  1 tablet        Take 1 tablet by mouth daily.   Refills:  0        clobetasol 0.05 % external solution   Commonly known as:  TEMOVATE        Refills:  2        IBUPROFEN PO   Dose:  600 mg        Take 600 mg by mouth every 4 hours as needed for moderate pain   Refills:  0        ketoconazole 2 % shampoo    Commonly known as:  NIZORAL        Refills:  11        oxyCODONE IR 5 MG tablet   Commonly known as:  ROXICODONE   Dose:  5-10 mg   Quantity:  15 tablet        Take 1-2 tablets (5-10 mg) by mouth every 6 hours as needed for moderate to severe pain   Refills:  0        tretinoin 0.025 % cream   Commonly known as:  RETIN-A   Quantity:  45 g        Use every night   Refills:  6                Orders Needing Specimen Collection     None      Pending Results     No orders found from 5/30/2018 to 6/2/2018.            Pending Culture Results     No orders found from 5/30/2018 to 6/2/2018.            Pending Results Instructions     If you had any lab results that were not finalized at the time of your Discharge, you can call the ED Lab Result RN at [phone]. You will be contacted by this team for any positive Lab results or changes in treatment. The nurses are available 7 days a week from 10A to 6:30P.  You can leave a message 24 hours per day and they will return your call.        Thank you for choosing [facility]       Thank you for choosing [facility] for your care. Our goal is always to provide you with excellent care. Hearing back from our patients is one way we can continue to improve our services. Please take a few minutes to complete the written survey that you may receive in the mail after you visit with us. Thank you!        [employer] Information     [employer] gives you secure access to your electronic health record. If you see a primary care provider, you can also send messages to your care team and make appointments. If you have questions, please call your primary care clinic.  If you do not have a primary care provider, please call [phone] and they will assist you.        Care EveryWhere ID     This is your Care EveryWhere ID. This could be used by other organizations to access your [facility] medical records  [id number]        Equal Access to Services     MANISHA NEWTON AH: Darci Rodriguez  hortencia barclay, padmini mallory. So [facility] [phone].    ATENCIÓN: Si habla español, tiene a knight disposición servicios gratuitos de asistencia lingüística. Llame al [phone].    We comply with applicable federal civil rights laws and [place] laws. We do not discriminate on the basis of race, color, national origin, age, disability, sex, sexual orientation, or gender identity.            After Visit Summary       This is your record. Keep this with you and show to your community pharmacist(s) and doctor(s) at your next visit.

## 2018-06-01 NOTE — ED AVS SNAPSHOT
[facility], [place], Emergency Department    [address] [place] [address]    Phone:  [phone]                                       Edwina Wynne   MRN: [id number]    Department:  [facility], Emergency Department   Date of Visit:  6/1/2018           After Visit Summary Signature Page     I have received my discharge instructions, and my questions have been answered. I have discussed any challenges I see with this plan with the nurse or doctor.    ..........................................................................................................................................  Patient/Patient Representative Signature      ..........................................................................................................................................  Patient Representative Print Name and Relationship to Patient    ..................................................               ................................................  Date                                            Time    ..........................................................................................................................................  Reviewed by Signature/Title    ...................................................              ..............................................  Date                                                            Time

## 2018-06-02 NOTE — ED TRIAGE NOTES
Patient presents with c/o post-op problem. Patient had lap ben one week ago. States she noticed steri-strip came off one of the trocar sites tonight after she took a shower. Reports redness and drainage from site. Afebrile during triage.

## 2018-06-02 NOTE — DISCHARGE INSTRUCTIONS
Leave the right lower wound open. Dress with bacitracin and dry gauze.  Return for fever, increasing redness, pain or drainage.

## 2018-06-02 NOTE — ED PROVIDER NOTES
History     Chief Complaint   Patient presents with     Post-op Problem     HPI  Edwina Wynne is a 47 year old female who presents with concern for wound infection. She had a laparoscopic cholecystectomy 1 week ago. She has been doing well. Tonight the RLQ port site popped open and drained some purulent material. She has no fever, chills, sweats, cough, shortness of breath, nausea, vomiting, diarrhea or dysuria. She also notes bruising of her right volar forearm where she had arterial puncture for angiogram. She has no numbness or weakness in the hand.    PAST MEDICAL HISTORY:   Past Medical History:   Diagnosis Date     Anemia     PREGNANCY     Herpes simplex without mention of complication      HSV      Hypertension     Gestational     PONV (postoperative nausea and vomiting)     POST C SECTION     Pre-eclampsia        PAST SURGICAL HISTORY:   Past Surgical History:   Procedure Laterality Date     C BREAST PROSTHESIS, NOS  2004      SECTION       HERNIORRHAPHY VENTRAL  10/26/2012    Procedure: HERNIORRHAPHY VENTRAL;  Open Ventral Hernia Repair  ;  Surgeon: Shaun Clinton MD;  Location: [facility] OR     LAPAROSCOPIC CHOLECYSTECTOMY N/A 2018    Procedure: LAPAROSCOPIC CHOLECYSTECTOMY;  LAPAROSCOPIC CHOLECYSTECTOMY .;  Surgeon: Brandon Reagan MD;  Location: [facility] OR     LAPAROSCOPY DIAGNOSTIC (GYN) Bilateral 2016    Procedure: LAPAROSCOPY DIAGNOSTIC (GYN);  Surgeon: Ventura Flower MD;  Location: [facility]     LAPAROTOMY MINI, TUBAL LIGATION (POST PARTUM), COMBINED  2012    Procedure:COMBINED LAPAROTOMY MINI, TUBAL LIGATION (POST PARTUM); Surgeon:RAVEN YARBROUGH; Location:[facility] L[facility]D     TUBOPLASTY REANASTOMOSIS Bilateral 2016    Procedure: TUBOPLASTY REANASTOMOSIS;  Surgeon: Ventura Flower MD;  Location: [facility]       FAMILY HISTORY:   Family History   Problem Relation Age of Onset     Hypertension Father      Hypertension Brother      CEREBROVASCULAR DISEASE  "Maternal Grandmother 50     Macular Degeneration Paternal Grandmother      Eye Disorder Other      Great aunt, retinitis pigmentosa     Asthma No family hx of      C.A.D. No family hx of      DIABETES No family hx of      Breast Cancer No family hx of      Cancer - colorectal No family hx of      Prostate Cancer No family hx of      Glaucoma No family hx of        SOCIAL HISTORY:   Social History   Substance Use Topics     Smoking status: Former Smoker     Packs/day: 1.00     Years: 15.00     Types: Cigarettes     Quit date: 1/1/2000     Smokeless tobacco: Never Used     Alcohol use No      Comment: occas         I have reviewed the Medications, Allergies, Past Medical and Surgical History, and Social History in the Epic system.    Review of Systems   Constitutional: Negative for chills and fever.   HENT: Negative for congestion.    Eyes: Negative for visual disturbance.   Respiratory: Negative for cough, shortness of breath and wheezing.    Cardiovascular: Negative for chest pain.   Gastrointestinal: Negative for abdominal pain, nausea and vomiting.   Genitourinary: Negative for dysuria.   Musculoskeletal: Negative for back pain.   Skin: Positive for wound.   Neurological: Negative for weakness and numbness.   Psychiatric/Behavioral: Negative for confusion.       Physical Exam   BP: 141/66  Pulse: 87  Temp: 98.1  F (36.7  C)  Resp: 16  Height: 167.6 cm (5' 6\")  Weight: 70.7 kg (155 lb 12.8 oz)  SpO2: 99 %      Physical Exam   Constitutional: She is oriented to person, place, and time. She appears well-developed. No distress.   HENT:   Head: Normocephalic and atraumatic.   Right Ear: External ear normal.   Left Ear: External ear normal.   Eyes: EOM are normal. Pupils are equal, round, and reactive to light. No scleral icterus.   Neck: Normal range of motion. Neck supple. No JVD present.   Cardiovascular: Normal rate, regular rhythm and normal heart sounds.    No murmur heard.  Pulmonary/Chest: Effort normal and " breath sounds normal. She has no wheezes.   Abdominal: Soft. Bowel sounds are normal. There is tenderness in the right lower quadrant. There is no rebound, no guarding and no CVA tenderness.       Musculoskeletal: She exhibits no edema.        Right wrist: She exhibits normal range of motion.        Arms:  2+ radial and ulnar pulses, normal capillary refill, normal sensation and motor function right wrist and hand.   Lymphadenopathy:     She has no cervical adenopathy.   Neurological: She is alert and oriented to person, place, and time.   Skin: Skin is warm and dry.   Psychiatric: She has a normal mood and affect. Her behavior is normal.   Nursing note and vitals reviewed.      ED Course     ED Course     Procedures        Patient seen by general surgery.     Assessments & Plan (with Medical Decision Making)   Impression:  RLQ wound drainage. Minimal serous drainage following opening of the skin wound. No evidence of abscess or cellulitis. No evidence of hernia.    I have reviewed the nursing notes.    I have reviewed the findings, diagnosis, plan and need for follow up with the patient.    New Prescriptions    No medications on file       Final diagnoses:   Wound drainage       6/1/2018   [facility], [place], EMERGENCY DEPARTMENT     Juan Haley MD  06/01/18 6786

## 2018-06-14 ENCOUNTER — TELEPHONE (OUTPATIENT)
Dept: SURGERY | Facility: CLINIC | Age: 48
End: 2018-06-14

## 2018-06-14 NOTE — TELEPHONE ENCOUNTER
Established Patient Telephone Reminder Call    Date of call:  06/14/18  Phone numbers:  Home number on file [phone] (home)    Reached patient/confirmed appointment:  Yes  Appointment with:   Dr. Yanick Lenz  Reason for visit:  Post op

## 2018-06-15 ENCOUNTER — OFFICE VISIT (OUTPATIENT)
Dept: SURGERY | Facility: CLINIC | Age: 48
End: 2018-06-15
Payer: COMMERCIAL

## 2018-06-15 VITALS
HEIGHT: 66 IN | WEIGHT: 154.3 LBS | OXYGEN SATURATION: 100 % | DIASTOLIC BLOOD PRESSURE: 83 MMHG | BODY MASS INDEX: 24.8 KG/M2 | SYSTOLIC BLOOD PRESSURE: 124 MMHG | HEART RATE: 93 BPM

## 2018-06-15 DIAGNOSIS — Z98.890 POSTOPERATIVE STATE: Primary | ICD-10-CM

## 2018-06-15 ASSESSMENT — PAIN SCALES - GENERAL: PAINLEVEL: NO PAIN (0)

## 2018-06-15 NOTE — MR AVS SNAPSHOT
After Visit Summary   6/15/2018    Edwina Wynne    MRN: [id number]           Patient Information     Date Of Birth          1970        Visit Information        Provider Department      6/15/2018 2:00 PM Yanick Lenz MD [facility] General Surgery        Today's Diagnoses     Postoperative state    -  1      Care Instructions    You met with Dr. Yanick Lenz.      Today's visit instructions:    Return to the Surgery Clinic on an as needed basis.        If you have questions please contact Paul or Era during regular clinic hours, Monday through Friday 7:30 AM - 4:00 PM, or you can contact us via [employer] at anytime.       If you have urgent needs after-hours, weekends, or holidays please call the hospital at [phone] and ask to speak with our on-call General Surgery Team.    Appointment [phone], option #1   Nurse Advice ([place] Girard): [phone]   Surgery Scheduler ([place]): [phone]  Fax: [phone]                    Follow-ups after your visit        Who to contact     Please call your clinic at [phone] to:    Ask questions about your health    Make or cancel appointments    Discuss your medicines    Learn about your test results    Speak to your doctor            Additional Information About Your Visit        [employer] Information     [employer] gives you secure access to your electronic health record. If you see a primary care provider, you can also send messages to your care team and make appointments. If you have questions, please call your primary care clinic.  If you do not have a primary care provider, please call [phone] and they will assist you.      [employer] is an electronic gateway that provides easy, online access to your medical records. With [employer], you can request a clinic appointment, read your test results, renew a prescription or communicate with your care team.     To access your existing account, please contact your  "[facility] or call [phone] for assistance.        Care EveryWhere ID     This is your Care EveryWhere ID. This could be used by other organizations to access your [facility] medical records  [id number]        Your Vitals Were     Pulse Height Last Period Pulse Oximetry BMI (Body Mass Index)       93 5' 6\" 05/20/2018 100% 24.9 kg/m2        Blood Pressure from Last 3 Encounters:   06/15/18 124/83   06/01/18 141/66   05/23/18 126/71    Weight from Last 3 Encounters:   06/15/18 154 lb 4.8 oz   06/01/18 155 lb 12.8 oz   05/22/18 153 lb              Today, you had the following     No orders found for display       Primary Care Provider Office Phone # Fax #    Blanca Jennifer Heard [phone]       [facility] [address]  [facility] [address]        Equal Access to Services     [facility] Delta Medical CenterMARIBELL : Hadii aad ku deneeno Sodaniel, waaxda luqadaha, qaybta kaalmada adeegyada, padmini tejada . So [facility] [phone].    ATENCIÓN: Si habla español, tiene a knight disposición servicios gratuitos de asistencia lingüística. Llame al [phone].    We comply with applicable federal civil rights laws and [place] laws. We do not discriminate on the basis of race, color, national origin, age, disability, sex, sexual orientation, or gender identity.            Thank you!     Thank you for choosing [facility]  for your care. Our goal is always to provide you with excellent care. Hearing back from our patients is one way we can continue to improve our services. Please take a few minutes to complete the written survey that you may receive in the mail after your visit with us. Thank you!             Your Updated Medication List - Protect others around you: Learn how to safely use, store and throw away your medicines at www.disposemymeds.org.          This list is accurate as of 6/15/18  2:14 PM.  Always use your most recent med list.    "                Brand Name Dispense Instructions for use Diagnosis    CALCIUM + D PO      Take 1 tablet by mouth daily.        clobetasol 0.05 % external solution    TEMOVATE          IBUPROFEN PO      Take 600 mg by mouth every 4 hours as needed for moderate pain        ketoconazole 2 % shampoo    NIZORAL          oxyCODONE IR 5 MG tablet    ROXICODONE    15 tablet    Take 1-2 tablets (5-10 mg) by mouth every 6 hours as needed for moderate to severe pain    S/P laparoscopic cholecystectomy       tretinoin 0.025 % cream    RETIN-A    45 g    Use every night    Other acne

## 2018-06-15 NOTE — PATIENT INSTRUCTIONS
You met with Dr. Yanick Lenz.      Today's visit instructions:    Return to the Surgery Clinic on an as needed basis.        If you have questions please contact Paul or Era during regular clinic hours, Monday through Friday 7:30 AM - 4:00 PM, or you can contact us via [employer] at anytime.       If you have urgent needs after-hours, weekends, or holidays please call the hospital at [phone] and ask to speak with our on-call General Surgery Team.    Appointment [phone], option #1   Nurse Advice (Paul Girard): [phone]   Surgery Scheduler ([place]): [phone]  Fax: [phone]

## 2018-06-15 NOTE — NURSING NOTE
"Chief Complaint   Patient presents with     Surgical Followup     post op        Vitals:    06/15/18 1405   BP: 124/83   Pulse: 93   SpO2: 100%   Weight: 70 kg (154 lb 4.8 oz)   Height: 1.676 m (5' 6\")       Body mass index is 24.9 kg/(m^2).      WOUND EVALUATION:                        "

## 2018-06-15 NOTE — PROGRESS NOTES
Edwina Wynne is status post:  Lap ben with TH  Surgery without complications.  Post-op course without complications.  Incisions examined, no signs of infection.  All of the patients questions were answered.  Standard post-op instructions and restrictions given.  Follow-up with me on a PRN basis.

## 2018-06-15 NOTE — LETTER
6/15/2018       RE: Edwina Wynne  [address]  [facility] [address]     Dear Colleague,    Thank you for referring your patient, Edwina Wynne, to the [facility] SURGERY at [facility]. Please see a copy of my visit note below.    Edwina Wynne is status post:  Lap ben with TH  Surgery without complications.  Post-op course without complications.  Incisions examined, no signs of infection.  All of the patients questions were answered.  Standard post-op instructions and restrictions given.  Follow-up with me on a PRN basis.      Again, thank you for allowing me to participate in the care of your patient.      Sincerely,    Yanick Lenz MD

## 2018-10-05 ASSESSMENT — ENCOUNTER SYMPTOMS
HOT FLASHES: 0
DECREASED LIBIDO: 0

## 2018-10-08 ENCOUNTER — OFFICE VISIT (OUTPATIENT)
Dept: CARDIOLOGY | Facility: CLINIC | Age: 48
End: 2018-10-08
Attending: INTERNAL MEDICINE
Payer: COMMERCIAL

## 2018-10-08 VITALS
WEIGHT: 162 LBS | BODY MASS INDEX: 26.03 KG/M2 | HEIGHT: 66 IN | DIASTOLIC BLOOD PRESSURE: 93 MMHG | SYSTOLIC BLOOD PRESSURE: 140 MMHG | OXYGEN SATURATION: 100 % | HEART RATE: 73 BPM

## 2018-10-08 DIAGNOSIS — I10 ESSENTIAL HYPERTENSION, BENIGN: Primary | ICD-10-CM

## 2018-10-08 PROCEDURE — 93010 ELECTROCARDIOGRAM REPORT: CPT | Mod: ZP | Performed by: INTERNAL MEDICINE

## 2018-10-08 PROCEDURE — 93005 ELECTROCARDIOGRAM TRACING: CPT | Mod: ZF

## 2018-10-08 PROCEDURE — 99213 OFFICE O/P EST LOW 20 MIN: CPT | Mod: ZP | Performed by: INTERNAL MEDICINE

## 2018-10-08 PROCEDURE — G0463 HOSPITAL OUTPT CLINIC VISIT: HCPCS | Mod: 25,ZF

## 2018-10-08 ASSESSMENT — PAIN SCALES - GENERAL: PAINLEVEL: NO PAIN (0)

## 2018-10-08 NOTE — NURSING NOTE
Chief Complaint   Patient presents with     New Patient     referral from Dr. Heard for abnormal stress test     Medications reviewed and vitals and EKG performed.   Michelle Oakley CMA

## 2018-10-08 NOTE — LETTER
10/8/2018      RE: Edwina Wynne  [address]  [facility] [address]       Dear Colleague,    Thank you for the opportunity to participate in the care of your patient, Edwina Wynne, at the [facility] at [facility]. Please see a copy of my visit note below.    [facility]  CARDIOVASCULAR MEDICINE CLINIC NOTE    Referring Provider: Referred Self   Primary Care Provider: Blanca Heard     Patient Name: Edwina Wynne   MRN: [id number]     PERTINENT CLINICAL HISTORY:   Edwina Wynne is a 48 year old female gestational HTN and cholecystitis s/p cholecystectomy who is presenting for evaluation of an abnormal stress test.     Edwina had a stress exercise echocardiogram which showed RCA ischemia with a sub-optimal HR. Follow up coronary angiogram revealed non obstructive CAD with luminal irregularities. The stress test was done for atypical chest pain.    Patient reports no recurrent chest pain and denies any current life style limitations. She denies any palpitations, dyspnea, chest pain, orthopnea, PND, leg swelling, dizzyspells or syncope.     PAST MEDICAL HISTORY:     Past Medical History:   Diagnosis Date     Anemia     PREGNANCY     Herpes simplex without mention of complication      HSV      Hypertension     Gestational     PONV (postoperative nausea and vomiting)     POST C SECTION     Pre-eclampsia         PAST SURGICAL HISTORY:     Past Surgical History:   Procedure Laterality Date     C BREAST PROSTHESIS, NOS  2004      SECTION       HERNIORRHAPHY VENTRAL  10/26/2012    Procedure: HERNIORRHAPHY VENTRAL;  Open Ventral Hernia Repair  ;  Surgeon: Shaun Clinton MD;  Location: [facility]     LAPAROSCOPIC CHOLECYSTECTOMY N/A 2018    Procedure: LAPAROSCOPIC CHOLECYSTECTOMY;  LAPAROSCOPIC CHOLECYSTECTOMY .;  Surgeon: Brandon Reagan MD;  Location: [facility]      LAPAROSCOPY DIAGNOSTIC (GYN) Bilateral 9/27/2016    Procedure: LAPAROSCOPY DIAGNOSTIC (GYN);  Surgeon: Ventura Flower MD;  Location: [facility]     LAPAROTOMY MINI, TUBAL LIGATION (POST PARTUM), COMBINED  5/24/2012    Procedure:COMBINED LAPAROTOMY MINI, TUBAL LIGATION (POST PARTUM); Surgeon:RAVEN YARBROUGH; Location:[facility] L[facility]D     TUBOPLASTY REANASTOMOSIS Bilateral 9/27/2016    Procedure: TUBOPLASTY REANASTOMOSIS;  Surgeon: Ventura Flower MD;  Location: [facility]        CURRENT MEDICATIONS:     Current Outpatient Prescriptions   Medication Sig Dispense Refill     Calcium Carbonate-Vitamin D (CALCIUM + D PO) Take 1 tablet by mouth daily.       IBUPROFEN PO Take 600 mg by mouth every 4 hours as needed for moderate pain       ketoconazole (NIZORAL) 2 % shampoo   11     tretinoin (RETIN-A) 0.025 % cream Use every night 45 g 6     clobetasol (TEMOVATE) 0.05 % external solution   2     oxyCODONE IR (ROXICODONE) 5 MG tablet Take 1-2 tablets (5-10 mg) by mouth every 6 hours as needed for moderate to severe pain (Patient not taking: Reported on 10/8/2018) 15 tablet 0        ALLERGIES:   No Known Allergies     FAMILY HISTORY:     Family History   Problem Relation Age of Onset     Hypertension Father      Hypertension Brother      Cerebrovascular Disease Maternal Grandmother 50     Macular Degeneration Paternal Grandmother      Eye Disorder Other      Great aunt, retinitis pigmentosa     Asthma No family hx of      C.A.D. No family hx of      Diabetes No family hx of      Breast Cancer No family hx of      Cancer - colorectal No family hx of      Prostate Cancer No family hx of      Glaucoma No family hx of         SOCIAL HISTORY:     Social History     Social History     Marital status:      Spouse name: N/A     Number of children: N/A     Years of education: N/A     Social History Main Topics     Smoking status: Former Smoker     Packs/day: 1.00     Years: 15.00     Types: Cigarettes     Quit date: 1/1/2000     Smokeless  "tobacco: Never Used     Alcohol use No      Comment: occas     Drug use: No     Sexual activity: Yes     Partners: Male     Birth control/ protection: Female Surgical      Comment: tubal ligation     Other Topics Concern     None     Social History Narrative     Edwina  reports that she does not drink alcohol. and  reports that she quit smoking about 18 years ago. Her smoking use included Cigarettes. She has a 15.00 pack-year smoking history. She has never used smokeless tobacco..      PHYSICAL EXAMINATION:   BP (!) 140/93 (BP Location: Left arm, Patient Position: Chair, Cuff Size: Adult Regular)  Pulse 73  Ht 1.676 m (5' 6\")  Wt 73.5 kg (162 lb)  SpO2 100%  BMI 26.15 kg/m2  Body mass index is 26.15 kg/(m^2).  Wt Readings from Last 2 Encounters:   10/08/18 73.5 kg (162 lb)   06/15/18 70 kg (154 lb 4.8 oz)     Constitutional: no acute distress, pleasant and cooperative, appears overall well.  Eyes:sclera white, conjunctiva clear, without icterus or pallor   Ears/Nose/Mouth/Throat: Pinna, tragus, and external canal non-tender are normal, Nares patent b/l, moist mucous membranes  Cardiovascular: RRR nl S1S2, JVP not elevated, extremities with no edema or cyanosis  Respiratory: clear to auscultation and percussion bilaterally anterior and posterior  Gastrointestinal: soft, nontender, non distended, no hepatosplenomegaly or masses  Musculoskeletal: normal muscle bulk and tone, joints   Skin: normal skin appearance without worrisome lesions.   Neurologic: Alert and oriented, face symmetric, normal gait  Psychiatric: appropriate affect, eye contact, intact thought and speech       LABORATORY DATA:     LIPID RESULTS:  Recent Labs   Lab Test  02/06/18   0939  10/10/17   1040   CHOL  191  240*   HDL  60  66   LDL  116*  156*   TRIG  75  91        LIVER ENZYME RESULTS:  Recent Labs   Lab Test  05/23/18   0802  05/22/18   0644   AST  90*  188*   ALT  213*  325*       CBC RESULTS:  Recent Labs   Lab Test  05/21/18   0704  " 05/20/18   1659   WBC  4.7  6.1   HGB  11.1*  11.4*   HCT  33.8*  34.4*   PLT  199  223       BMP RESULTS:  Recent Labs   Lab Test  05/21/18   0704  05/20/18   1659   NA  139  133   POTASSIUM  3.6  3.7   CHLORIDE  108  100   CO2  24  27   ANIONGAP  6  5   GLC  99  106*   BUN  7  11   CR  0.58  0.64   ALEX  8.1*  8.3*       A1C RESULTS:  No results found for: A1C    INR RESULTS:  Recent Labs   Lab Test  05/21/18   0704  05/20/18   1659   INR  1.03  1.05          PROCEDURES & FURTHER ASSESSMENTS:     EKG: Sinus rhythm with an IRBBB    ECHO:   05/20/2018  Interpretation Summary  Global and regional left ventricular function is normal with an EF of 60-65%.  No regional wall motion abnormalities are seen.  Right ventricular function, chamber size, wall motion, and thickness are  normal.  The inferior vena cava is normal.    STRESS TEST:    05/09/2018  Interpretation Summary  Abnormal, intermediate-risk stress test with ECG changes and wall motion  abnormalities suggestive of ischemia in the RCA territory.     The target heart rate was not achieved. Exercise terminated due to leg  fatigue. Normal heart rate and BP response to exercise.  Normal biventricular size, thickness, and global systolic function at  baseline, LVEF=60-65%.  With exercise, LVEF failed to increase and LV cavity size did not decrease.  There is exercise-induced hypokinesis involving the [id number]-pj-llr inferior  myocardial segments. This pattern is suggestive of ischemia in the RCA  territory.  Resting ECG shows sinus rhythm with incomplete RBBB and inferior T-wave  inversions. With exercise, there are up to 2mm horizontal ST depressions in  the inferior leads, which resolve 2:50 into the recovery phase.  No angina was elicited.  Functional capacity is normal for age.  No significant valvular abnormalities are noted on screening Doppler exam.  The aortic root and visualized ascending aorta are normal.    CARDIAC CATH:    05/20/2018       CLINICAL  IMPRESSION:     Edwina Wynne is a 48 year old female gestational HTN and cholecystitis s/p cholecystectomy who had an episode of atypical chest pain with stress test showing possible RCA ischemia with subsequent coronary angiogram revealing normal vessels. No recurrent chest pain.     - Continue risk factor modification  - No need for further cardiac testing or interventions  - Follow up PRN    Thank you for allowing us to take part in the care of this very pleasant patient.  Please do not hesitate to call if any further questions or concerns arise.    Patient seen and discussed with Dr. Drew.     Barney Huang MD  Cardiology Fellow    October 8, 2018    ATTENDING ATTESTATION:   I personally examined and evaluated this patient on October 8, 2018.   I have reviewed today's vital signs, medications, labs, and imaging results. I have reviewed and edited, as necessary, the history, review of systems, physical examination, and assessment and plan. I discussed the patient with Dr. Huang and agree with the assessment and plan of care as documented in the note above.    Thank you for allowing us to take part in the care of this very pleasant patient.  Please do not hesitate to call if any further questions or concerns arise.    Gurpreet Drew MD, PhD  Interventional/Critical Care Cardiology  [phone]    October 8, 2018        CC  Patient Care Team:  Blanca Heard MD as PCP - General (Internal Medicine)  Blanca Heard MD as MD (Internal Medicine)  Damari Dunn OD as MD (Optometry)  SELF, REFERRED

## 2018-10-08 NOTE — MR AVS SNAPSHOT
After Visit Summary   10/8/2018    Edwina Wynne    MRN: [id number]           Patient Information     Date Of Birth          1970        Visit Information        Provider Department      10/8/2018 4:30 PM Gurpreet Drew MD [facility]        Today's Diagnoses     Essential hypertension, benign    -  1      Care Instructions    Patient Instructions:  It was a pleasure to see you in the cardiology clinic today.      If you have any questions, call  Kaity De Anda RN, at [phone].  Press Option #1 for the [facility], and then press Option #3 for nursing.  We are encouraging the use of [employer] to communicate with your HealthCare Provider    Note the new medications: none  Stop the following medications: none    The results from today include: none  Please follow up with primary care physician      If you have an urgent need after hours (8:00 am to 4:30 pm) please call [phone] and ask for the cardiology fellow on call.            Follow-ups after your visit        Who to contact     If you have questions or need follow up information about today's clinic visit or your schedule please contact [facility] directly at [phone].  Normal or non-critical lab and imaging results will be communicated to you by [employer], letter or phone within 4 business days after the clinic has received the results. If you do not hear from us within 7 days, please contact the clinic through [employer] or phone. If you have a critical or abnormal lab result, we will notify you by phone as soon as possible.  Submit refill requests through [employer] or call your pharmacy and they will forward the refill request to us. Please allow 3 business days for your refill to be completed.          Additional Information About Your Visit        MyChart Information     [employer] gives you secure access to your electronic health record. If you see a primary care provider, you can  "also send messages to your care team and make appointments. If you have questions, please call your primary care clinic.  If you do not have a primary care provider, please call [phone] and they will assist you.        Care EveryWhere ID     This is your Care EveryWhere ID. This could be used by other organizations to access your [facility] medical records  [id number]        Your Vitals Were     Pulse Height Pulse Oximetry BMI (Body Mass Index)          73 1.676 m (5' 6\") 100% 26.15 kg/m2         Blood Pressure from Last 3 Encounters:   10/08/18 (!) 140/93   06/15/18 124/83   06/01/18 141/66    Weight from Last 3 Encounters:   10/08/18 73.5 kg (162 lb)   06/15/18 70 kg (154 lb 4.8 oz)   06/01/18 70.7 kg (155 lb 12.8 oz)              We Performed the Following     EKG 12-lead, tracing only (Same Day)        Primary Care Provider Office Phone # Fax #    Blanca Jennifer Heard [phone]       [facility] [address]  [facility] [address]        Equal Access to Services     [facility]: Hadii aad ku hadasho Soomaali, waaxda luqadaha, qaybta kaalmada adeegyada, padmini whiting hayavtarn alphonso tejada ah. So [facility] [phone].    ATENCIÓN: Si habla español, tiene a knight disposición servicios gratuitos de asistencia lingüística. Llame al [phone].    We comply with applicable federal civil rights laws and [place] laws. We do not discriminate on the basis of race, color, national origin, age, disability, sex, sexual orientation, or gender identity.            Thank you!     Thank you for choosing [facility]  for your care. Our goal is always to provide you with excellent care. Hearing back from our patients is one way we can continue to improve our services. Please take a few minutes to complete the written survey that you may receive in the mail after your visit with us. Thank you!             Your Updated Medication List - Protect others around you: Learn how to safely " use, store and throw away your medicines at www.disposemymeds.org.          This list is accurate as of 10/8/18  4:48 PM.  Always use your most recent med list.                   Brand Name Dispense Instructions for use Diagnosis    CALCIUM + D PO      Take 1 tablet by mouth daily.        clobetasol 0.05 % external solution    TEMOVATE          IBUPROFEN PO      Take 600 mg by mouth every 4 hours as needed for moderate pain        ketoconazole 2 % shampoo    NIZORAL          oxyCODONE IR 5 MG tablet    ROXICODONE    15 tablet    Take 1-2 tablets (5-10 mg) by mouth every 6 hours as needed for moderate to severe pain    S/P laparoscopic cholecystectomy       tretinoin 0.025 % cream    RETIN-A    45 g    Use every night    Other acne

## 2018-10-08 NOTE — NURSING NOTE
Med Reconcile: Reviewed and verified all current medications with the patient. The updated medication list was printed and given to the patient.  Patient stated she understood all health information given and agreed to call with further questions or concerns.

## 2018-10-08 NOTE — PROGRESS NOTES
[facility]  CARDIOVASCULAR MEDICINE CLINIC NOTE    Referring Provider: Referred Self   Primary Care Provider: Blanca Heard     Patient Name: Edwina Wynne   MRN: [id number]     PERTINENT CLINICAL HISTORY:   Edwina Wynne is a 48 year old female gestational HTN and cholecystitis s/p cholecystectomy who is presenting for evaluation of an abnormal stress test.     Edwina had a stress exercise echocardiogram which showed RCA ischemia with a sub-optimal HR. Follow up coronary angiogram revealed non obstructive CAD with luminal irregularities. The stress test was done for atypical chest pain.    Patient reports no recurrent chest pain and denies any current life style limitations. She denies any palpitations, dyspnea, chest pain, orthopnea, PND, leg swelling, dizzyspells or syncope.     PAST MEDICAL HISTORY:     Past Medical History:   Diagnosis Date     Anemia     PREGNANCY     Herpes simplex without mention of complication      HSV      Hypertension     Gestational     PONV (postoperative nausea and vomiting)     POST C SECTION     Pre-eclampsia         PAST SURGICAL HISTORY:     Past Surgical History:   Procedure Laterality Date     C BREAST PROSTHESIS, NOS  2004      SECTION       HERNIORRHAPHY VENTRAL  10/26/2012    Procedure: HERNIORRHAPHY VENTRAL;  Open Ventral Hernia Repair  ;  Surgeon: Shaun Clinton MD;  Location: [facility]     LAPAROSCOPIC CHOLECYSTECTOMY N/A 2018    Procedure: LAPAROSCOPIC CHOLECYSTECTOMY;  LAPAROSCOPIC CHOLECYSTECTOMY .;  Surgeon: Brandon Reagan MD;  Location: [facility] OR     LAPAROSCOPY DIAGNOSTIC (GYN) Bilateral 2016    Procedure: LAPAROSCOPY DIAGNOSTIC (GYN);  Surgeon: Ventura Flower MD;  Location: [facility]     LAPAROTOMY MINI, TUBAL LIGATION (POST PARTUM), COMBINED  2012    Procedure:COMBINED LAPAROTOMY MINI, TUBAL LIGATION (POST PARTUM); Surgeon:RAVEN YARBROUGH; Location:[facility]     TUBOPLASTY  REANASTOMOSIS Bilateral 9/27/2016    Procedure: TUBOPLASTY REANASTOMOSIS;  Surgeon: Ventura Flower MD;  Location: [facility]        CURRENT MEDICATIONS:     Current Outpatient Prescriptions   Medication Sig Dispense Refill     Calcium Carbonate-Vitamin D (CALCIUM + D PO) Take 1 tablet by mouth daily.       IBUPROFEN PO Take 600 mg by mouth every 4 hours as needed for moderate pain       ketoconazole (NIZORAL) 2 % shampoo   11     tretinoin (RETIN-A) 0.025 % cream Use every night 45 g 6     clobetasol (TEMOVATE) 0.05 % external solution   2     oxyCODONE IR (ROXICODONE) 5 MG tablet Take 1-2 tablets (5-10 mg) by mouth every 6 hours as needed for moderate to severe pain (Patient not taking: Reported on 10/8/2018) 15 tablet 0        ALLERGIES:   No Known Allergies     FAMILY HISTORY:     Family History   Problem Relation Age of Onset     Hypertension Father      Hypertension Brother      Cerebrovascular Disease Maternal Grandmother 50     Macular Degeneration Paternal Grandmother      Eye Disorder Other      Great aunt, retinitis pigmentosa     Asthma No family hx of      C.A.D. No family hx of      Diabetes No family hx of      Breast Cancer No family hx of      Cancer - colorectal No family hx of      Prostate Cancer No family hx of      Glaucoma No family hx of         SOCIAL HISTORY:     Social History     Social History     Marital status:      Spouse name: N/A     Number of children: N/A     Years of education: N/A     Social History Main Topics     Smoking status: Former Smoker     Packs/day: 1.00     Years: 15.00     Types: Cigarettes     Quit date: 1/1/2000     Smokeless tobacco: Never Used     Alcohol use No      Comment: occas     Drug use: No     Sexual activity: Yes     Partners: Male     Birth control/ protection: Female Surgical      Comment: tubal ligation     Other Topics Concern     None     Social History Narrative     Edwina  reports that she does not drink alcohol. and  reports that she quit  "smoking about 18 years ago. Her smoking use included Cigarettes. She has a 15.00 pack-year smoking history. She has never used smokeless tobacco..     REVIEW OF SYSTEMS:   A comprehensive review of systems was performed and negative unless otherwise noted in the HPI above.      PHYSICAL EXAMINATION:   BP (!) 140/93 (BP Location: Left arm, Patient Position: Chair, Cuff Size: Adult Regular)  Pulse 73  Ht 1.676 m (5' 6\")  Wt 73.5 kg (162 lb)  SpO2 100%  BMI 26.15 kg/m2  Body mass index is 26.15 kg/(m^2).  Wt Readings from Last 2 Encounters:   10/08/18 73.5 kg (162 lb)   06/15/18 70 kg (154 lb 4.8 oz)     Constitutional: no acute distress, pleasant and cooperative, appears overall well.  Eyes:sclera white, conjunctiva clear, without icterus or pallor   Ears/Nose/Mouth/Throat: Pinna, tragus, and external canal non-tender are normal, Nares patent b/l, moist mucous membranes  Cardiovascular: RRR nl S1S2, JVP not elevated, extremities with no edema or cyanosis  Respiratory: clear to auscultation and percussion bilaterally anterior and posterior  Gastrointestinal: soft, nontender, non distended, no hepatosplenomegaly or masses  Musculoskeletal: normal muscle bulk and tone, joints   Skin: normal skin appearance without worrisome lesions.   Neurologic: Alert and oriented, face symmetric, normal gait  Psychiatric: appropriate affect, eye contact, intact thought and speech       LABORATORY DATA:     LIPID RESULTS:  Recent Labs   Lab Test  02/06/18   0939  10/10/17   1040   CHOL  191  240*   HDL  60  66   LDL  116*  156*   TRIG  75  91        LIVER ENZYME RESULTS:  Recent Labs   Lab Test  05/23/18   0802  05/22/18   0644   AST  90*  188*   ALT  213*  325*       CBC RESULTS:  Recent Labs   Lab Test  05/21/18   0704  05/20/18   1659   WBC  4.7  6.1   HGB  11.1*  11.4*   HCT  33.8*  34.4*   PLT  199  223       BMP RESULTS:  Recent Labs   Lab Test  05/21/18   0704  05/20/18   1659   NA  139  133   POTASSIUM  3.6  3.7   CHLORIDE  " 108  100   CO2  24  27   ANIONGAP  6  5   GLC  99  106*   BUN  7  11   CR  0.58  0.64   ALEX  8.1*  8.3*       A1C RESULTS:  No results found for: A1C    INR RESULTS:  Recent Labs   Lab Test  05/21/18   0704  05/20/18   1659   INR  1.03  1.05          PROCEDURES & FURTHER ASSESSMENTS:     EKG: Sinus rhythm with an IRBBB    ECHO:   05/20/2018  Interpretation Summary  Global and regional left ventricular function is normal with an EF of 60-65%.  No regional wall motion abnormalities are seen.  Right ventricular function, chamber size, wall motion, and thickness are  normal.  The inferior vena cava is normal.    STRESS TEST:    05/09/2018  Interpretation Summary  Abnormal, intermediate-risk stress test with ECG changes and wall motion  abnormalities suggestive of ischemia in the RCA territory.     The target heart rate was not achieved. Exercise terminated due to leg  fatigue. Normal heart rate and BP response to exercise.  Normal biventricular size, thickness, and global systolic function at  baseline, LVEF=60-65%.  With exercise, LVEF failed to increase and LV cavity size did not decrease.  There is exercise-induced hypokinesis involving the [id number] inferior  myocardial segments. This pattern is suggestive of ischemia in the RCA  territory.  Resting ECG shows sinus rhythm with incomplete RBBB and inferior T-wave  inversions. With exercise, there are up to 2mm horizontal ST depressions in  the inferior leads, which resolve 2:50 into the recovery phase.  No angina was elicited.  Functional capacity is normal for age.  No significant valvular abnormalities are noted on screening Doppler exam.  The aortic root and visualized ascending aorta are normal.    CARDIAC CATH:    05/20/2018       CLINICAL IMPRESSION:     Edwina Casiano Aicharichi Wynne is a 48 year old female gestational HTN and cholecystitis s/p cholecystectomy who had an episode of atypical chest pain with stress test showing possible RCA ischemia with  subsequent coronary angiogram revealing normal vessels. No recurrent chest pain.     - Continue risk factor modification  - No need for further cardiac testing or interventions  - Follow up PRN    Thank you for allowing us to take part in the care of this very pleasant patient.  Please do not hesitate to call if any further questions or concerns arise.    Patient seen and discussed with Dr. Drew.     Barney Huang MD  Cardiology Fellow    October 8, 2018    ATTENDING ATTESTATION:   I personally examined and evaluated this patient on October 8, 2018.   I have reviewed today's vital signs, medications, labs, and imaging results. I have reviewed and edited, as necessary, the history, review of systems, physical examination, and assessment and plan. I discussed the patient with Dr. Huang and agree with the assessment and plan of care as documented in the note above.    Thank you for allowing us to take part in the care of this very pleasant patient.  Please do not hesitate to call if any further questions or concerns arise.    Gurpreet Drew MD, PhD  Interventional/Critical Care Cardiology  [phone]    October 8, 2018        CC  Patient Care Team:  Blanca Heard MD as PCP - General (Internal Medicine)  Blanca Heard MD as MD (Internal Medicine)  Damari Dunn OD as MD (Optometry)  SELF, REFERRED

## 2018-10-08 NOTE — PATIENT INSTRUCTIONS
Patient Instructions:  It was a pleasure to see you in the cardiology clinic today.      If you have any questions, call  Kaity De Anda RN, at [phone].  Press Option #1 for the [facility], and then press Option #3 for nursing.  We are encouraging the use of [employer] to communicate with your HealthCare Provider    Note the new medications: none  Stop the following medications: none    The results from today include: none  Please follow up with primary care physician      If you have an urgent need after hours (8:00 am to 4:30 pm) please call [phone] and ask for the cardiology fellow on call.

## 2018-10-09 LAB — INTERPRETATION ECG - MUSE: NORMAL

## 2019-05-03 ENCOUNTER — HEALTH MAINTENANCE LETTER (OUTPATIENT)
Age: 49
End: 2019-05-03

## 2019-05-15 ENCOUNTER — ANCILLARY PROCEDURE (OUTPATIENT)
Dept: MAMMOGRAPHY | Facility: CLINIC | Age: 49
End: 2019-05-15
Attending: INTERNAL MEDICINE
Payer: COMMERCIAL

## 2019-05-15 DIAGNOSIS — Z12.31 SCREENING MAMMOGRAM, ENCOUNTER FOR: ICD-10-CM

## 2019-09-05 ENCOUNTER — OFFICE VISIT (OUTPATIENT)
Dept: OPHTHALMOLOGY | Facility: CLINIC | Age: 49
End: 2019-09-05
Payer: COMMERCIAL

## 2019-09-05 DIAGNOSIS — H52.03 HYPERMETROPIA OF BOTH EYES: Primary | ICD-10-CM

## 2019-09-05 ASSESSMENT — EXTERNAL EXAM - RIGHT EYE: OD_EXAM: NORMAL

## 2019-09-05 ASSESSMENT — REFRACTION_MANIFEST
OD_ADD: +1.50
OS_AXIS: 005
OS_SPHERE: +1.50
OD_CYLINDER: +0.75
OD_SPHERE: +1.25
OD_AXIS: 165
OS_ADD: +1.50
OS_CYLINDER: +0.75

## 2019-09-05 ASSESSMENT — CONF VISUAL FIELD
OD_NORMAL: 1
METHOD: COUNTING FINGERS
OS_NORMAL: 1

## 2019-09-05 ASSESSMENT — REFRACTION_CURRENTRX
OD_BRAND: B&L ULTRA
OS_BASECURVE: 8.5
OS_ADD: HI
OS_BRAND: BIOFINITY MULTIFOCAL
OS_BRAND: B&L ULTRA FOR PRESBYOPIA
OD_BRAND: BIOFINITY
OD_SPHERE: +1.50
OS_BASECURVE: 8.5
OS_ADD: +1.50D
OS_BRAND: B&L ULTRA FOR PRESBYOPIA
OD_DIAMETER: 14.0
OS_SPHERE: +1.75
OS_DIAMETER: 14.2
OD_BASECURVE: 8.6
OD_BRAND: B&L ULTRA
OD_SPHERE: +1.50
OD_DIAMETER: 14.2
OD_BASECURVE: 8.5
OS_ADD: LOW
OS_BASECURVE: 8.6
OS_SPHERE: +2.00
OS_SPHERE: +1.75
OD_BASECURVE: 8.5
OS_DIAMETER: 14.0
OD_SPHERE: +2.25
OD_DIAMETER: 14.2
OS_DIAMETER: 14.2

## 2019-09-05 ASSESSMENT — VISUAL ACUITY
OS_CC: J2
METHOD: SNELLEN - LINEAR
OD_CC: 20/20
OS_CC+: -2
OD_CC: J2
CORRECTION_TYPE: CONTACTS
OS_CC: 20/20

## 2019-09-05 ASSESSMENT — CUP TO DISC RATIO
OD_RATIO: 0.25
OS_RATIO: 0.25

## 2019-09-05 ASSESSMENT — SLIT LAMP EXAM - LIDS
COMMENTS: NORMAL
COMMENTS: NORMAL

## 2019-09-05 ASSESSMENT — TONOMETRY
IOP_METHOD: ICARE
OD_IOP_MMHG: 18
OS_IOP_MMHG: 17

## 2019-09-05 ASSESSMENT — EXTERNAL EXAM - LEFT EYE: OS_EXAM: NORMAL

## 2019-09-05 NOTE — PROGRESS NOTES
"History  HPI     Annual Eye Exam     Associated symptoms include dryness and glare.  Negative for eye pain, tearing, floaters, flashes and haloes.              Comments     Pt feels VA has gone down in past year.  Looking to possibly change contact brand.  Current CL get \"scratchy\" and dry.  Pt has no pain or other concerns today.    ROEL Mabry September 5, 2019 11:40 AM            Last edited by Rosita Pinedo COT on 9/5/2019 11:40 AM. (History)          Assessment/Plan  (H52.03) Hypermetropia of both eyes  (primary encounter diagnosis)  Comment: Hyperopia with presbyopia both eyes   Plan: REFRACTION, C CONTACT LENS FITTING COSMETIC LVL 1 - ADULT         Educated patient on condition and clinical findings. Dispensed spectacle prescription for full time wear. Educated patient on possibility of adaptation period, if symptoms do not improve return to clinic for further testing.   Dispensed trial lenses and finalized contact lens prescription for 2 years. Modified monovision, distance both eyes with multifocal left eye.    Return to clinic in 1 year for comprehensive eye exam.    Contact Lens Billing  V-Code:  - Soft spherical  Final Contact Lens Rx       Brand Base Curve Diameter Sphere Add    Right B&L Ultra 8.5 14.2 +1.50     Left B&L Ultra for Presbyopia 8.5 14.2 +1.75 Hi    Expiration Date:  9/5/2021    Replacement:  Monthly    Wearing Schedule:  Daily wear         CL Fitting Fee: $75    These are for cosmetic contact lenses.    Encounter Diagnosis   Name Primary?     Hypermetropia of both eyes Yes     Complete documentation of historical and exam elements from today's encounter can  be found in the full encounter summary report (not reduplicated in this progress  note). I personally obtained the chief complaint(s) and history of present illness. I  confirmed and edited as necessary the review of systems, past medical/surgical  history, family history, social history, and examination findings as " documented by  others; and I examined the patient myself. I personally reviewed the relevant tests,  images, and reports as documented above. I formulated and edited as necessary the  assessment and plan and discussed the findings and management plan with the  patient and family.    Hao Barahona OD, FAAO

## 2019-09-05 NOTE — NURSING NOTE
"Chief Complaints and History of Present Illnesses   Patient presents with     Annual Eye Exam     Chief Complaint(s) and History of Present Illness(es)     Annual Eye Exam     Associated symptoms: dryness and glare.  Negative for eye pain, tearing, floaters, flashes and haloes              Comments     Pt feels VA has gone down in past year.  Looking to possibly change contact brand.  Current CL get \"scratchy\" and dry.  Pt has no pain or other concerns today.    ROEL Mabry September 5, 2019 11:40 AM                  "

## 2019-09-23 ENCOUNTER — OFFICE VISIT (OUTPATIENT)
Dept: INTERNAL MEDICINE | Facility: CLINIC | Age: 49
End: 2019-09-23
Attending: INTERNAL MEDICINE
Payer: COMMERCIAL

## 2019-09-23 VITALS
BODY MASS INDEX: 26.2 KG/M2 | SYSTOLIC BLOOD PRESSURE: 130 MMHG | HEIGHT: 66 IN | HEART RATE: 88 BPM | DIASTOLIC BLOOD PRESSURE: 88 MMHG | WEIGHT: 163 LBS

## 2019-09-23 DIAGNOSIS — N92.0 MENORRHAGIA WITH REGULAR CYCLE: Primary | ICD-10-CM

## 2019-09-23 DIAGNOSIS — D25.9 UTERINE LEIOMYOMA, UNSPECIFIED LOCATION: ICD-10-CM

## 2019-09-23 DIAGNOSIS — Z00.00 ROUTINE GENERAL MEDICAL EXAMINATION AT A HEALTH CARE FACILITY: ICD-10-CM

## 2019-09-23 DIAGNOSIS — Z00.00 PREVENTATIVE HEALTH CARE: ICD-10-CM

## 2019-09-23 LAB
ANION GAP SERPL CALCULATED.3IONS-SCNC: 7 MMOL/L (ref 3–14)
BUN SERPL-MCNC: 13 MG/DL (ref 7–30)
CALCIUM SERPL-MCNC: 9 MG/DL (ref 8.5–10.1)
CHLORIDE SERPL-SCNC: 104 MMOL/L (ref 94–109)
CHOLEST SERPL-MCNC: 259 MG/DL
CO2 SERPL-SCNC: 28 MMOL/L (ref 20–32)
CREAT SERPL-MCNC: 0.59 MG/DL (ref 0.52–1.04)
ERYTHROCYTE [DISTWIDTH] IN BLOOD BY AUTOMATED COUNT: 12.4 % (ref 10–15)
FERRITIN SERPL-MCNC: 7 NG/ML (ref 8–252)
GFR SERPL CREATININE-BSD FRML MDRD: >90 ML/MIN/{1.73_M2}
GLUCOSE SERPL-MCNC: 81 MG/DL (ref 70–99)
HCT VFR BLD AUTO: 39.6 % (ref 35–47)
HDLC SERPL-MCNC: 64 MG/DL
HGB BLD-MCNC: 13.2 G/DL (ref 11.7–15.7)
LDLC SERPL CALC-MCNC: 171 MG/DL
MCH RBC QN AUTO: 28.8 PG (ref 26.5–33)
MCHC RBC AUTO-ENTMCNC: 33.3 G/DL (ref 31.5–36.5)
MCV RBC AUTO: 86 FL (ref 78–100)
NONHDLC SERPL-MCNC: 195 MG/DL
PLATELET # BLD AUTO: 253 10E9/L (ref 150–450)
POTASSIUM SERPL-SCNC: 3.7 MMOL/L (ref 3.4–5.3)
RBC # BLD AUTO: 4.59 10E12/L (ref 3.8–5.2)
SODIUM SERPL-SCNC: 139 MMOL/L (ref 133–144)
TRIGL SERPL-MCNC: 119 MG/DL
WBC # BLD AUTO: 5.9 10E9/L (ref 4–11)

## 2019-09-23 PROCEDURE — G0463 HOSPITAL OUTPT CLINIC VISIT: HCPCS | Mod: 25,ZF

## 2019-09-23 PROCEDURE — 80061 LIPID PANEL: CPT | Performed by: INTERNAL MEDICINE

## 2019-09-23 PROCEDURE — 90686 IIV4 VACC NO PRSV 0.5 ML IM: CPT | Mod: ZF

## 2019-09-23 PROCEDURE — 36415 COLL VENOUS BLD VENIPUNCTURE: CPT | Performed by: INTERNAL MEDICINE

## 2019-09-23 PROCEDURE — 82728 ASSAY OF FERRITIN: CPT | Performed by: INTERNAL MEDICINE

## 2019-09-23 PROCEDURE — G0008 ADMIN INFLUENZA VIRUS VAC: HCPCS | Mod: ZF

## 2019-09-23 PROCEDURE — 25000128 H RX IP 250 OP 636: Mod: ZF

## 2019-09-23 PROCEDURE — 85027 COMPLETE CBC AUTOMATED: CPT | Performed by: INTERNAL MEDICINE

## 2019-09-23 PROCEDURE — 80048 BASIC METABOLIC PNL TOTAL CA: CPT | Performed by: INTERNAL MEDICINE

## 2019-09-23 ASSESSMENT — PATIENT HEALTH QUESTIONNAIRE - PHQ9
SUM OF ALL RESPONSES TO PHQ QUESTIONS 1-9: 3
5. POOR APPETITE OR OVEREATING: NOT AT ALL

## 2019-09-23 ASSESSMENT — MIFFLIN-ST. JEOR: SCORE: 1391.11

## 2019-09-23 ASSESSMENT — ANXIETY QUESTIONNAIRES
GAD7 TOTAL SCORE: 0
1. FEELING NERVOUS, ANXIOUS, OR ON EDGE: NOT AT ALL
2. NOT BEING ABLE TO STOP OR CONTROL WORRYING: NOT AT ALL
7. FEELING AFRAID AS IF SOMETHING AWFUL MIGHT HAPPEN: NOT AT ALL
6. BECOMING EASILY ANNOYED OR IRRITABLE: NOT AT ALL
3. WORRYING TOO MUCH ABOUT DIFFERENT THINGS: NOT AT ALL
5. BEING SO RESTLESS THAT IT IS HARD TO SIT STILL: NOT AT ALL

## 2019-09-23 ASSESSMENT — PAIN SCALES - GENERAL: PAINLEVEL: NO PAIN (0)

## 2019-09-23 NOTE — LETTER
2019       RE: Edwina Wynne  [address]  [place] [facility] [address]     Dear Colleague,    Thank you for referring your patient, Edwina Wynne, to the [facility]  at [facility]. Please see a copy of my visit note below.     SUBJECTIVE:   CC: Edwina Wynne is an 49 year old woman who presents for preventive health visit.     Healthy Habits:    Do you get at least three servings of calcium containing foods daily (dairy, green leafy vegetables, etc.)? yes    Amount of exercise or daily activities, outside of work: not regular    Problems taking medications regularly No    Medication side effects: No    Have you had an eye exam in the past two years? yes    Do you see a dentist twice per year? yes    Do you have sleep apnea, excessive snoring or daytime drowsiness?no      -------------------------------------    Today's PHQ-2 Score:   PHQ-2 (  [employer]) 2019   Q1: Little interest or pleasure in doing things 0 0   Q2: Feeling down, depressed or hopeless 0 0   PHQ-2 Score 0 0   Q1: Little interest or pleasure in doing things - -   Q2: Feeling down, depressed or hopeless - -   PHQ-2 Score - -       Abuse: Current or Past(Physical, Sexual or Emotional)- No  Do you feel safe in your environment? Yes    Social History     Tobacco Use     Smoking status: Former Smoker     Packs/day: 1.00     Years: 15.00     Pack years: 15.00     Types: Cigarettes     Last attempt to quit: 2000     Years since quittin.7     Smokeless tobacco: Never Used   Substance Use Topics     Alcohol use: No     Alcohol/week: 0.0 standard drinks     Comment: occas     If you drink alcohol do you typically have >3 drinks per day or >7 drinks per week? No                     Reviewed orders with patient.  Reviewed health maintenance and updated orders accordingly - Yes  Lab work is in process    Mammogram Screening: Patient  under age 50, mutual decision reflected in health maintenance.      Pertinent mammograms are reviewed under the imaging tab.  History of abnormal Pap smear:   NO - age 30-65 PAP every 5 years with negative HPV co-testing recommended  Last 3 Pap and HPV Results:   PAP / HPV Latest Ref Rng & Units 2016   PAP - NIL NIL   HPV 16 DNA NEG Negative -   HPV 18 DNA NEG Negative -   OTHER HR HPV NEG Negative -     PAP / HPV Latest Ref Rng & Units 2016   PAP - NIL NIL   HPV 16 DNA NEG Negative -   HPV 18 DNA NEG Negative -   OTHER HR HPV NEG Negative -     Reviewed and updated as needed this visit by clinical staff  Tobacco  Allergies  Meds         Reviewed and updated as needed this visit by Provider        Past Medical History:   Diagnosis Date     Anemia     PREGNANCY     Herpes simplex without mention of complication      HSV      Hypertension     Gestational     PONV (postoperative nausea and vomiting)     POST C SECTION     Pre-eclampsia       Past Surgical History:   Procedure Laterality Date     C BREAST PROSTHESIS, NOS  2004      SECTION       HERNIORRHAPHY VENTRAL  10/26/2012    Procedure: HERNIORRHAPHY VENTRAL;  Open Ventral Hernia Repair  ;  Surgeon: hSaun Clinton MD;  Location: [facility]     LAPAROSCOPIC CHOLECYSTECTOMY N/A 2018    Procedure: LAPAROSCOPIC CHOLECYSTECTOMY;  LAPAROSCOPIC CHOLECYSTECTOMY .;  Surgeon: Brandon Reagan MD;  Location: [facility]     LAPAROSCOPY DIAGNOSTIC (GYN) Bilateral 2016    Procedure: LAPAROSCOPY DIAGNOSTIC (GYN);  Surgeon: Ventura Flower MD;  Location: [facility]     LAPAROTOMY MINI, TUBAL LIGATION (POST PARTUM), COMBINED  2012    Procedure:COMBINED LAPAROTOMY MINI, TUBAL LIGATION (POST PARTUM); Surgeon:RAVEN YARBROUGH; [facility]     TUBOPLASTY REANASTOMOSIS Bilateral 2016    Procedure: TUBOPLASTY REANASTOMOSIS;  Surgeon: Ventura Flower MD;  Location: [facility]       ROS:  CONSTITUTIONAL: NEGATIVE for  "fever, chills, change in weight  INTEGUMENTARU/SKIN: NEGATIVE for worrisome rashes, moles or lesions  EYES: NEGATIVE for vision changes or irritation  ENT: NEGATIVE for ear, mouth and throat problems  RESP: NEGATIVE for significant cough or SOB  CV: NEGATIVE for chest pain, palpitations or peripheral edema  GI: NEGATIVE for nausea, abdominal pain, heartburn, or change in bowel habits  : NEGATIVE for unusual urinary or vaginal symptoms. Periods are regular and heavy.  MUSCULOSKELETAL: NEGATIVE for significant arthralgias or myalgia  NEURO: NEGATIVE for weakness, dizziness or paresthesias  PSYCHIATRIC: NEGATIVE for changes in mood or affect    OBJECTIVE:   Ht 1.676 m (5' 6\")   Wt 73.9 kg (163 lb)   LMP 08/16/2019   Breastfeeding? No   BMI 26.31 kg/m     EXAM:  GENERAL: healthy, alert and no distress  EYES: Eyes grossly normal to inspection, PERRL and conjunctivae and sclerae normal  HENT: ear canals and TM's normal, nose and mouth without ulcers or lesions  NECK: no adenopathy, no asymmetry, masses, or scars and thyroid normal to palpation  RESP: lungs clear to auscultation - no rales, rhonchi or wheezes  CV: regular rate and rhythm, normal S1 S2, no S3 or S4, no murmur, click or rub, no peripheral edema and peripheral pulses strong  ABDOMEN: soft, nontender, no hepatosplenomegaly, no masses and bowel sounds normal  MS: no gross musculoskeletal defects noted, no edema  SKIN: no suspicious lesions or rashes  NEURO: Normal strength and tone, mentation intact and speech normal  PSYCH: mentation appears normal, affect normal/bright    Diagnostic Test Results:  Labs reviewed in Epic    ASSESSMENT/PLAN:   1. Menorrhagia with regular cycle  Discussed the need for further evaluation and management of menorrhagia with patient.  Referral for pelvic ultrasound as well as consultation with OB/GYN was given to the patient.  Patient was also advised on the need for evaluation for iron deficiency.  - US Pelvic Complete with " "Transvaginal; Future  - OB/GYN REFERRAL  - Ferritin    2. Preventative health care  Discussed preventive healthcare needs with patient.  Will check lipid panel as well as CBC and a basic chemistry panel to screen for hyperlipidemia, anemia, and diabetes.  Discussed vaccinations with patient.  Patient will be advised on test results accordingly.  - CBC with Platelets  - Lipid Profile  - Basic Metabolic Panel    3. Routine general medical examination at a health care facility    COUNSELING:   Reviewed preventive health counseling, as reflected in patient instructions       Regular exercise       Healthy diet/nutrition       Vision screening    Estimated body mass index is 26.31 kg/m  as calculated from the following:    Height as of this encounter: 1.676 m (5' 6\").    Weight as of this encounter: 73.9 kg (163 lb).         reports that she quit smoking about 19 years ago. Her smoking use included cigarettes. She has a 15.00 pack-year smoking history. She has never used smokeless tobacco.      Counseling Resources:  ATP IV Guidelines  Pooled Cohorts Equation Calculator  Breast Cancer Risk Calculator  FRAX Risk Assessment  ICSI Preventive Guidelines  Dietary Guidelines for Americans, 2010  USDA's MyPlate  ASA Prophylaxis  Lung CA Screening    Blanca Heard MD  [facility]         "

## 2019-09-23 NOTE — PROGRESS NOTES
SUBJECTIVE:   CC: Edwina Wynne is an 49 year old woman who presents for preventive health visit.     Healthy Habits:    Do you get at least three servings of calcium containing foods daily (dairy, green leafy vegetables, etc.)? yes    Amount of exercise or daily activities, outside of work: not regular    Problems taking medications regularly No    Medication side effects: No    Have you had an eye exam in the past two years? yes    Do you see a dentist twice per year? yes    Do you have sleep apnea, excessive snoring or daytime drowsiness?no      -------------------------------------    Today's PHQ-2 Score:   PHQ-2 (  [employer]) 2019   Q1: Little interest or pleasure in doing things 0 0   Q2: Feeling down, depressed or hopeless 0 0   PHQ-2 Score 0 0   Q1: Little interest or pleasure in doing things - -   Q2: Feeling down, depressed or hopeless - -   PHQ-2 Score - -       Abuse: Current or Past(Physical, Sexual or Emotional)- No  Do you feel safe in your environment? Yes    Social History     Tobacco Use     Smoking status: Former Smoker     Packs/day: 1.00     Years: 15.00     Pack years: 15.00     Types: Cigarettes     Last attempt to quit: 2000     Years since quittin.7     Smokeless tobacco: Never Used   Substance Use Topics     Alcohol use: No     Alcohol/week: 0.0 standard drinks     Comment: occas     If you drink alcohol do you typically have >3 drinks per day or >7 drinks per week? No                     Reviewed orders with patient.  Reviewed health maintenance and updated orders accordingly - Yes  Lab work is in process    Mammogram Screening: Patient under age 50, mutual decision reflected in health maintenance.      Pertinent mammograms are reviewed under the imaging tab.  History of abnormal Pap smear:   NO - age 30-65 PAP every 5 years with negative HPV co-testing recommended  Last 3 Pap and HPV Results:   PAP / HPV Latest Ref Rng & Units 2016    PAP - NIL NIL   HPV 16 DNA NEG Negative -   HPV 18 DNA NEG Negative -   OTHER HR HPV NEG Negative -     PAP / HPV Latest Ref Rng & Units 2016   PAP - NIL NIL   HPV 16 DNA NEG Negative -   HPV 18 DNA NEG Negative -   OTHER HR HPV NEG Negative -     Reviewed and updated as needed this visit by clinical staff  Tobacco  Allergies  Meds         Reviewed and updated as needed this visit by Provider        Past Medical History:   Diagnosis Date     Anemia     PREGNANCY     Herpes simplex without mention of complication      HSV      Hypertension     Gestational     PONV (postoperative nausea and vomiting)     POST C SECTION     Pre-eclampsia       Past Surgical History:   Procedure Laterality Date     C BREAST PROSTHESIS, NOS  2004      SECTION       HERNIORRHAPHY VENTRAL  10/26/2012    Procedure: HERNIORRHAPHY VENTRAL;  Open Ventral Hernia Repair  ;  Surgeon: Shaun Clinton MD;  Location: [facility]     LAPAROSCOPIC CHOLECYSTECTOMY N/A 2018    Procedure: LAPAROSCOPIC CHOLECYSTECTOMY;  LAPAROSCOPIC CHOLECYSTECTOMY .;  Surgeon: Brandon Reagan MD;  Location: [facility] OR     LAPAROSCOPY DIAGNOSTIC (GYN) Bilateral 2016    Procedure: LAPAROSCOPY DIAGNOSTIC (GYN);  Surgeon: Ventura Flower MD;  Location: [facility]     LAPAROTOMY MINI, TUBAL LIGATION (POST PARTUM), COMBINED  2012    Procedure:COMBINED LAPAROTOMY MINI, TUBAL LIGATION (POST PARTUM); Surgeon:RAVEN YARBROUGH; Location: [facility]     TUBOPLASTY REANASTOMOSIS Bilateral 2016    Procedure: TUBOPLASTY REANASTOMOSIS;  Surgeon: Ventura Flower MD;  Location: [facility]       ROS:  CONSTITUTIONAL: NEGATIVE for fever, chills, change in weight  INTEGUMENTARU/SKIN: NEGATIVE for worrisome rashes, moles or lesions  EYES: NEGATIVE for vision changes or irritation  ENT: NEGATIVE for ear, mouth and throat problems  RESP: NEGATIVE for significant cough or SOB  CV: NEGATIVE for chest pain, palpitations or peripheral edema  GI:  "NEGATIVE for nausea, abdominal pain, heartburn, or change in bowel habits  : NEGATIVE for unusual urinary or vaginal symptoms. Periods are regular and heavy.  MUSCULOSKELETAL: NEGATIVE for significant arthralgias or myalgia  NEURO: NEGATIVE for weakness, dizziness or paresthesias  PSYCHIATRIC: NEGATIVE for changes in mood or affect    OBJECTIVE:   Ht 1.676 m (5' 6\")   Wt 73.9 kg (163 lb)   LMP 08/16/2019   Breastfeeding? No   BMI 26.31 kg/m    EXAM:  GENERAL: healthy, alert and no distress  EYES: Eyes grossly normal to inspection, PERRL and conjunctivae and sclerae normal  HENT: ear canals and TM's normal, nose and mouth without ulcers or lesions  NECK: no adenopathy, no asymmetry, masses, or scars and thyroid normal to palpation  RESP: lungs clear to auscultation - no rales, rhonchi or wheezes  CV: regular rate and rhythm, normal S1 S2, no S3 or S4, no murmur, click or rub, no peripheral edema and peripheral pulses strong  ABDOMEN: soft, nontender, no hepatosplenomegaly, no masses and bowel sounds normal  MS: no gross musculoskeletal defects noted, no edema  SKIN: no suspicious lesions or rashes  NEURO: Normal strength and tone, mentation intact and speech normal  PSYCH: mentation appears normal, affect normal/bright    Diagnostic Test Results:  Labs reviewed in Epic    ASSESSMENT/PLAN:   1. Menorrhagia with regular cycle  Discussed the need for further evaluation and management of menorrhagia with patient.  Referral for pelvic ultrasound as well as consultation with OB/GYN was given to the patient.  Patient was also advised on the need for evaluation for iron deficiency.  - US Pelvic Complete with Transvaginal; Future  - OB/GYN REFERRAL  - Ferritin    2. Preventative health care  Discussed preventive healthcare needs with patient.  Will check lipid panel as well as CBC and a basic chemistry panel to screen for hyperlipidemia, anemia, and diabetes.  Discussed vaccinations with patient.  Patient will be " "advised on test results accordingly.  - CBC with Platelets  - Lipid Profile  - Basic Metabolic Panel    3. Routine general medical examination at a health care facility          COUNSELING:   Reviewed preventive health counseling, as reflected in patient instructions       Regular exercise       Healthy diet/nutrition       Vision screening    Estimated body mass index is 26.31 kg/m  as calculated from the following:    Height as of this encounter: 1.676 m (5' 6\").    Weight as of this encounter: 73.9 kg (163 lb).         reports that she quit smoking about 19 years ago. Her smoking use included cigarettes. She has a 15.00 pack-year smoking history. She has never used smokeless tobacco.      Counseling Resources:  ATP IV Guidelines  Pooled Cohorts Equation Calculator  Breast Cancer Risk Calculator  FRAX Risk Assessment  ICSI Preventive Guidelines  Dietary Guidelines for Americans, 2010  USDA's MyPlate  ASA Prophylaxis  Lung CA Screening    Blanca Heard MD  [facility]   "

## 2019-09-24 ASSESSMENT — ANXIETY QUESTIONNAIRES: GAD7 TOTAL SCORE: 0

## 2019-10-02 ENCOUNTER — ANCILLARY PROCEDURE (OUTPATIENT)
Dept: ULTRASOUND IMAGING | Facility: CLINIC | Age: 49
End: 2019-10-02
Attending: INTERNAL MEDICINE
Payer: COMMERCIAL

## 2019-10-02 ENCOUNTER — OFFICE VISIT (OUTPATIENT)
Dept: OBGYN | Facility: CLINIC | Age: 49
End: 2019-10-02
Payer: COMMERCIAL

## 2019-10-02 VITALS
DIASTOLIC BLOOD PRESSURE: 89 MMHG | HEART RATE: 85 BPM | BODY MASS INDEX: 26.2 KG/M2 | WEIGHT: 163 LBS | SYSTOLIC BLOOD PRESSURE: 143 MMHG | HEIGHT: 66 IN

## 2019-10-02 DIAGNOSIS — R93.89 INCREASED ENDOMETRIAL STRIPE THICKNESS: ICD-10-CM

## 2019-10-02 DIAGNOSIS — N92.0 MENORRHAGIA WITH REGULAR CYCLE: ICD-10-CM

## 2019-10-02 DIAGNOSIS — N92.1 MENORRHAGIA WITH IRREGULAR CYCLE: Primary | ICD-10-CM

## 2019-10-02 LAB
HCG UR QL: NEGATIVE
INTERNAL QC OK POCT: YES

## 2019-10-02 PROCEDURE — 76830 TRANSVAGINAL US NON-OB: CPT

## 2019-10-02 PROCEDURE — G0463 HOSPITAL OUTPT CLINIC VISIT: HCPCS | Mod: ZF

## 2019-10-02 PROCEDURE — 81025 URINE PREGNANCY TEST: CPT | Mod: ZF | Performed by: STUDENT IN AN ORGANIZED HEALTH CARE EDUCATION/TRAINING PROGRAM

## 2019-10-02 PROCEDURE — 58100 BIOPSY OF UTERUS LINING: CPT | Mod: ZF | Performed by: STUDENT IN AN ORGANIZED HEALTH CARE EDUCATION/TRAINING PROGRAM

## 2019-10-02 PROCEDURE — 88305 TISSUE EXAM BY PATHOLOGIST: CPT | Performed by: STUDENT IN AN ORGANIZED HEALTH CARE EDUCATION/TRAINING PROGRAM

## 2019-10-02 RX ORDER — TRANEXAMIC ACID 650 MG/1
1300 TABLET ORAL 3 TIMES DAILY
Qty: 30 TABLET | Refills: 3 | Status: SHIPPED | OUTPATIENT
Start: 2019-10-02 | End: 2021-02-05

## 2019-10-02 ASSESSMENT — ANXIETY QUESTIONNAIRES
1. FEELING NERVOUS, ANXIOUS, OR ON EDGE: NOT AT ALL
5. BEING SO RESTLESS THAT IT IS HARD TO SIT STILL: NOT AT ALL
GAD7 TOTAL SCORE: 0
7. FEELING AFRAID AS IF SOMETHING AWFUL MIGHT HAPPEN: NOT AT ALL
2. NOT BEING ABLE TO STOP OR CONTROL WORRYING: NOT AT ALL
3. WORRYING TOO MUCH ABOUT DIFFERENT THINGS: NOT AT ALL
6. BECOMING EASILY ANNOYED OR IRRITABLE: NOT AT ALL

## 2019-10-02 ASSESSMENT — PATIENT HEALTH QUESTIONNAIRE - PHQ9
5. POOR APPETITE OR OVEREATING: NOT AT ALL
SUM OF ALL RESPONSES TO PHQ QUESTIONS 1-9: 0

## 2019-10-02 ASSESSMENT — MIFFLIN-ST. JEOR: SCORE: 1391.11

## 2019-10-02 NOTE — PROGRESS NOTES
"Endometrial Biopsy    Menstrual History:  Menstrual History 2018 2019 10/2/2019   LAST MENSTRUAL PERIOD 2018       Time Out - \"Pause for the Cause\"  Just before the procedure begins, through verbal and active participation of team members, verify:                      Initials   Patient Name ***   Patient  ***   Procedure to be performed ***                                                                                                                                      Indication: {Endo indications:954030090}  Faculty:  I was present with the {[facility] RES OR FELLOW:365495749} throughout {[facility] ENTIRE OR PORTION:275201171} procedure. My additional comments are ***    Pregnancy test:  {POSITIVE/NEGATIVE:635411::\"negative\"}    Consent: {Treatment consent:569930306}    Using a {SMALL MEDIUM LARGE:264116} {Endo Speculum type:376214732} speculum, the cervix was visualized. The cervix was prepped with {[facility] CERVIX PREP:317380968}.  A single tooth tenaculum was applied to the anterior lip of the cervix. The endometrial pipelle was advanced through the cervix without difficulty and a sample collected. {[facility] ONE OR NONE:019702155} additional pass was made.  The tenaculum was removed from the cervix and the tenaculum site made hemostatic with {Controlling bleedin}.    EBL: ***    Complications:  ***  Pathology: EMB sample {was:452722506} sent to pathology.  Tolerance of Procedure:  Patient {DID:937134::\"did not\"} tolerate the procedure well.     Patient was instructed to call if she experiences any heavy bleeding, severe cramping, or abnormal vaginal discharge.  May take ibuprofen 400-800 mg PO TID PRN or naproxen 500 mg PO BID for cramping.    Will notify patient of results.    ***    Danna Pinedo MD   OB/GYN Resident PGY-3  10/2/2019 1:15 PM         "

## 2019-10-03 ASSESSMENT — ANXIETY QUESTIONNAIRES: GAD7 TOTAL SCORE: 0

## 2019-10-03 NOTE — PROGRESS NOTES
[facility]  Gynecology Visit    CC: abnormal uterine bleeding    HPI:    Edwina Wynne is a 49 year old , here for follow up after pelvic ultrasound. Patient has been getting menses every 1.5-2 months. When they do occur she intermittently has very heave menses. Passing lots of clots and having to change pads every few hours. She last had regular menses 3 years ago, but reports for past year she has increased bleeding.  She wants to make sure everything is okay. She is also very nervous because her sister was just diagnosed with leiomyosarcoma.     Obstetrics History:  OB History    Para Term  AB Living   4 3 3 0 1 3   SAB TAB Ectopic Multiple Live Births   0 0 0 0 1      # Outcome Date GA Lbr Laurent/2nd Weight Sex Delivery Anes PTL Lv   4 Term 12 39w0d 03:10 / 00:24 3.204 kg (7 lb 1 oz) F  EPI N KARINA      Name: MARYBETH WYNNE      Apgar1: 9  Apgar5: 9   3 Term 09/15/10 37w0d  3.459 kg (7 lb 10 oz) F CS-LTranv Spinal        Birth Comments: breech, complicated gestional hypertension      Name: Dara   2 AB 2007              Birth Comments: no complications   1 Term 94 40w4d 04:00 3.459 kg (7 lb 10 oz) F  EPI        Birth Comments: delivery c/b fever       Gynecologic History:  - LMP: Patient's last menstrual period was 2019 (approximate).  - Last Pap: NILM, HPV: negative (2016)  - Denies any history of abnormal pap smears  - Denies prior cervical surgery or procedures  - Contraception:  None  - Sexual Activity: male partner    Past Medical History:  Anemia    Past Surgical History:  Past Surgical History:   Procedure Laterality Date     C BREAST PROSTHESIS, NOS  2004      SECTION       HERNIORRHAPHY VENTRAL  10/26/2012    Procedure: HERNIORRHAPHY VENTRAL;  Open Ventral Hernia Repair  ;  Surgeon: Shaun Clinton MD;  Location: [facility] OR     LAPAROSCOPIC CHOLECYSTECTOMY N/A 2018    Procedure: LAPAROSCOPIC CHOLECYSTECTOMY;   LAPAROSCOPIC CHOLECYSTECTOMY .;  Surgeon: Brandon Reagan MD;  Location: [facility] OR     LAPAROSCOPY DIAGNOSTIC (GYN) Bilateral 9/27/2016    Procedure: LAPAROSCOPY DIAGNOSTIC (GYN);  Surgeon: Ventura Flower MD;  Location: [facility]     LAPAROTOMY MINI, TUBAL LIGATION (POST PARTUM), COMBINED  5/24/2012    Procedure:COMBINED LAPAROTOMY MINI, TUBAL LIGATION (POST PARTUM); Surgeon:RAVEN YARBROUGH; Location[facility]     TUBOPLASTY REANASTOMOSIS Bilateral 9/27/2016    Procedure: TUBOPLASTY REANASTOMOSIS;  Surgeon: Ventura Flower MD;  Location: [facility]     Current Medications:  Prior to Admission medications    Medication Sig Last Dose Taking? Auth Provider   tranexamic acid (LYSTEDA) 650 MG tablet Take 2 tablets (1,300 mg) by mouth 3 times daily Take for maximum 5 days during menses  Yes Elena Oliveros MD   Calcium Carbonate-Vitamin D (CALCIUM + D PO) Take 1 tablet by mouth daily. Taking  Reported, Patient   clobetasol (TEMOVATE) 0.05 % external solution  Taking  Reported, Patient   IBUPROFEN PO Take 600 mg by mouth every 4 hours as needed for moderate pain Taking  Reported, Patient   ketoconazole (NIZORAL) 2 % shampoo  Taking  Reported, Patient   tretinoin (RETIN-A) 0.025 % cream Use every night Taking  ALEXANDRIA Sheehan MD     Allergies:  Patient has no known allergies.    Social History:     Denies any tobacco or illicit drug use.       Family History:  Sister has leiomyosarcoma  Denies any family history of bleeding or clotting disorders   Family History   Problem Relation Age of Onset     Hypertension Father      Hypertension Brother      Cerebrovascular Disease Maternal Grandmother 50     Macular Degeneration Paternal Grandmother      Eye Disorder Other         Great aunt, retinitis pigmentosa     Asthma No family hx of      C.A.D. No family hx of      Diabetes No family hx of      Breast Cancer No family hx of      Cancer - colorectal No family hx of      Prostate Cancer No family hx of       "Glaucoma No family hx of      ROS:  10-point ROS negative except as in HPI     Physical Exam  BP (!) 143/89   Pulse 85   Ht 1.676 m (5' 6\")   Wt 73.9 kg (163 lb)   LMP 2019 (Approximate)   BMI 26.31 kg/m    Gen: Well-appearing, NAD  HEENT: Normocephalic, atraumatic  CV:  Regular rate  Pulm: No respiratory distress   Abd: Soft, non-tender, non-distended  Ext: No LE edema, extremities warm and well perfused    I have reviewed labs and imaging    Labs:   Hgb 13.2     Imaging:   Pelvic US 10/2/19  Uterine findings:  Presence: Visible Size: Normal 6.7 x 6.4 x 5.1 cm.  Endometrium = 5.4 mm.  Cx length = 25.3 mm.  Flexion:  Anteverted    Position: Midline          Margins: Smooth         Shape: Normal Contour: Regular.  Texture: Homogeneous. Cavity: Normal. Masses: Abnormal, myoma, ill defined borders ~ 3.1 x 2.9 x 2.4cm  Right Adnexa: Normal  Left Adnexa: Normal  Bladder:  Normal                                               Cul - de - sac fluid: None    Assessment/Plan  Edwina Wynne is a 49 year old  female here for follow up after pelvic ultrasound. Patient is having irregular periods, likely perimenopausal. Though no other symptoms of menopause. Small intramural myoma seen on ultrasound, will plan repeat pelvic ultrasound in 2-4 months. Endometrial stripe 5.4 mm, given patient's age with increased vaginal bleeding recommended proceeding with endometrial biopsy to rule out any hyperplasia or malignancy. Reviewed risks and benefits, consents signed. See procedure note above.    Reviewed options for management of heavy menses including. Non-hormonal - least invasive, nonsteroidal anti-inflammatory agents, tranexamic acid  Hormonal options - cyclic oral progestogens, depot medroxyprogesterone acetate injections, the levonorgestrel releasing intrauterine system  Surgical options - most invasive, endometrial ablation and hysterectomy.     - Reviewed options as listed above, patient would like " "to proceed with TXA for management of menorrhagia. Reviewed with patient increased risks of blood clots with long term use, discussed importance of short term use only with menses.   - Endometrial biopsy performed as described below, if results normal okay with MyChart   - Repeat pelvic ultrasound in 2-4 months     Patient staffed with Dr. Domo Pinedo MD  OB/GYN Resident, PGY-3  10/2/2019, 7:39 PM    The Patient was seen in [facility] by AGATHA PINEDO.  I reviewed the history & exam. Assessment and plan were jointly made.    Elena Oliveros MD  I was present for procedure.   Elena Oliveros MD    Procedure Note: Endometrial Biopsy    Menstrual History:  Menstrual History 2018 2019 10/2/2019   LAST MENSTRUAL PERIOD 2018       Time Out - \"Pause for the Cause\"  Just before the procedure begins, through verbal and active participation of team members, verify:                      Initials   Patient Name PRM   Patient  PRM   Procedure to be performed PRM                                                                                                                                      Indication: menorrhagia    Pregnancy test:  negative    Consent: Risks, benefits of treatment, and no treatment were discussed.  Patient's questions were elicited and answered. , Written consent signed and scanned into medical record. and Patient received and verbalized understanding of discharge instructions    Using a medium Graves speculum, the cervix was visualized. The cervix was prepped with Betadine. The endometrial pipelle was advanced through the cervix without difficulty and a sample collected. No additional pass was made.     EBL: < 5 ml     Complications:  None  Pathology: EMB sample was sent to pathology.  Tolerance of Procedure:  Patient did tolerate the procedure well.     Patient was instructed to call if she experiences any heavy bleeding, " severe cramping, or abnormal vaginal discharge.  May take ibuprofen 400-800 mg PO TID PRN or naproxen 500 mg PO BID for cramping.    Dr. Oliveros present for entire procedure.       Danna Pinedo MD   OB/GYN Resident PGY-3  10/2/2019 1:15 PM

## 2019-10-04 LAB — COPATH REPORT: NORMAL

## 2020-01-13 ENCOUNTER — OFFICE VISIT (OUTPATIENT)
Dept: DERMATOLOGY | Facility: CLINIC | Age: 50
End: 2020-01-13
Payer: COMMERCIAL

## 2020-01-13 DIAGNOSIS — L70.0 ACNE VULGARIS: Primary | ICD-10-CM

## 2020-01-13 DIAGNOSIS — Z79.899 ON ISOTRETINOIN THERAPY: ICD-10-CM

## 2020-01-13 RX ORDER — DOXYCYCLINE 100 MG/1
100 CAPSULE ORAL 2 TIMES DAILY
Qty: 60 CAPSULE | Refills: 0 | Status: SHIPPED | OUTPATIENT
Start: 2020-01-13 | End: 2021-02-10

## 2020-01-13 RX ORDER — TRETINOIN 0.5 MG/G
CREAM TOPICAL AT BEDTIME
Qty: 45 G | Refills: 0 | Status: SHIPPED | OUTPATIENT
Start: 2020-01-13 | End: 2020-01-13

## 2020-01-13 RX ORDER — TRETINOIN 0.5 MG/G
CREAM TOPICAL AT BEDTIME
Qty: 45 G | Refills: 0 | Status: SHIPPED | OUTPATIENT
Start: 2020-01-13 | End: 2021-03-22

## 2020-01-13 ASSESSMENT — PAIN SCALES - GENERAL: PAINLEVEL: NO PAIN (0)

## 2020-01-13 NOTE — LETTER
"1/13/2020       RE: Ediwna Wynne  [address]  [facility] [address]     Dear Colleague,    Thank you for referring your patient, Edwina Wynne, to the [facility] DERMATOLOGY at [facility]. Please see a copy of my visit note below.    Formerly [facility] Dermatology Note      Dermatology Problem List:  1. Hyperkeratosis of the elbows   2. Acne vulgaris  -Current tx: plan to start isotretinoin 40 mg every day next month - 2/2020, doxycycline 100 mg BID, tretinoin 0.05% cream  -Previous tx: tretinoin 0.1% cream, clindamycin, BPO    Encounter Date: Jan 13, 2020    CC:  Chief Complaint   Patient presents with     Acne     Edwina is here today to be seen for acne.          History of Present Illness:  Ms. Edwina Wynne is a 49 year old female who is a return patient to the clinic and presents for acne. The patient was last seen on 10/06/2017 by Dr. Sheehan when a dermatofibroma was identified and Urea cream 40% was started for hyperkeratosis of the elbows. At today's visit, the patient notes she got tretinoin from [place] when she went there and has been using this for quite some time but it is not that effective. She is unsure whether the tretinoin works as she still notices new acne. The patient states her period is irregular and believes she is in a pre-menopausal stage. Notes that I see her daughter and prescribed her daughter Accutane and she is particularly interested in starting this because her daughter's skin looks so good. She has not been on isotretinoin before. Notes deep \"underground\" cystic type acne on the lower face. She notes that she has some dry skin on the back of her neck and upper chest which cleared up with hydrocortisone cream. The patient denies body acne bad notes it is mostly on the lower face and forehead. She states that she has been getting acne in the area for many years, but notes that it has " worsened lately. The patient notes she had a tubal ligation many years ago but then had the tubal ligation reversed. She is not currently on any form of contraception but states she does not believe she can get pregnant. She is perimenopausal but still gets periods. She says they are very irregular however. She was going to start tranexamic acid for heavy menstrual flow, but has not started this yet and won't if it will interfere with her ability to be on isotretinoin.  has not had vasectomy. She has not had a hysterectomy - partial or full.  She denies hx of anxiety/depression and IBD.  The patient denies painful, itching, tingling or bleeding lesions unless otherwise noted.    Past Medical History:   Patient Active Problem List   Diagnosis     Tubal ligation status     Ventral hernia, recurrent     Essential hypertension, benign     Tubal occlusion     Choledocholithiasis with acute cholecystitis     Past Medical History:   Diagnosis Date     Anemia     PREGNANCY     Herpes simplex without mention of complication      HSV      Hypertension     Gestational     PONV (postoperative nausea and vomiting)     POST C SECTION     Pre-eclampsia      Past Surgical History:   Procedure Laterality Date     C BREAST PROSTHESIS, NOS  2004      SECTION       HERNIORRHAPHY VENTRAL  10/26/2012    Procedure: HERNIORRHAPHY VENTRAL;  Open Ventral Hernia Repair  ;  Surgeon: Shaun Clinton MD;  Location: [facility] OR     LAPAROSCOPIC CHOLECYSTECTOMY N/A 2018    Procedure: LAPAROSCOPIC CHOLECYSTECTOMY;  LAPAROSCOPIC CHOLECYSTECTOMY .;  Surgeon: Brandon Reagan MD;  Location: [facility] OR     LAPAROSCOPY DIAGNOSTIC (GYN) Bilateral 2016    Procedure: LAPAROSCOPY DIAGNOSTIC (GYN);  Surgeon: Ventura Flower MD;  Location: [facility]     LAPAROTOMY MINI, TUBAL LIGATION (POST PARTUM), COMBINED  2012    Procedure:COMBINED LAPAROTOMY MINI, TUBAL LIGATION (POST PARTUM); Surgeon:RAVEN YARBROUGH; Location:[facility]      TUBOPLASTY REANASTOMOSIS Bilateral 9/27/2016    Procedure: TUBOPLASTY REANASTOMOSIS;  Surgeon: Ventura Flower MD;  Location: [facility]       Social History:   reports that she quit smoking about 20 years ago. Her smoking use included cigarettes. She has a 15.00 pack-year smoking history. She has never used smokeless tobacco. She reports that she does not drink alcohol or use drugs.    Family History:  Family History   Problem Relation Age of Onset     Hypertension Father      Hypertension Brother      Cerebrovascular Disease Maternal Grandmother 50     Macular Degeneration Paternal Grandmother      Eye Disorder Other         Great aunt, retinitis pigmentosa     Asthma No family hx of      C.A.D. No family hx of      Diabetes No family hx of      Breast Cancer No family hx of      Cancer - colorectal No family hx of      Prostate Cancer No family hx of      Glaucoma No family hx of        Medications:  Current Outpatient Medications   Medication Sig Dispense Refill     Calcium Carbonate-Vitamin D (CALCIUM + D PO) Take 1 tablet by mouth daily.       clobetasol (TEMOVATE) 0.05 % external solution   2     IBUPROFEN PO Take 600 mg by mouth every 4 hours as needed for moderate pain       ketoconazole (NIZORAL) 2 % shampoo   11     tranexamic acid (LYSTEDA) 650 MG tablet Take 2 tablets (1,300 mg) by mouth 3 times daily Take for maximum 5 days during menses 30 tablet 3     tretinoin (RETIN-A) 0.025 % cream Use every night 45 g 6       No Known Allergies    Review of Systems:  -Constitutional: The patient denies fatigue, fevers, chills, unintended weight loss, and night sweats.  -HEENT: Patient denies nonhealing oral sores.  -Skin: As above in HPI. No additional skin concerns.    Physical exam:  Vitals: There were no vitals taken for this visit.  GEN: This is a well developed, well-nourished male in no acute distress, in a pleasant mood.    SKIN: Acne exam, which includes the face, neck, upper central chest, and upper  central back was performed.  -Multiple nodules with few small pustules on the right chin and lower face, x1 papule on the glabella  -chest/back clear  -No other lesions of concern on areas examined.       Impression/Plan:  1. Acne vulgaris - likely hormonal due to location of acne and patient's perimenopausal state- discussed that isotretinoin can help treat this kind of acne however you may have recurrent acne following discontinuation    Educated on the etiology    Discussed treatment options including topical medications, oral antibiotics, spironolactone and accutane    Discussed need for two forms of birth control while on accutane as patient is unwilling to be abstinent and although she feels she cannot get pregnant we still must follow the iPledge guidelines as she has not fully underwent menopasue at this time and has no other contraceptive methods. Discussed primary and secondary forms of contraception.     Urine pregnancy test will be obtained after patient obtains IUD, and at that time we will register her with iPledge.  Will start isotretinoin 40mg 1 month after patient's initial pregnancy test. Goal dose is 150-220mg/kg for this patient. We will not have initial hcg obtained until patient has procedure for IUD placement.  Method of contraception includes: IUD (once she has this placed) and male condoms  Discussion of the risks and side effects of isotretinoin including but not limited to mucocutaneous dryness, arthralgias, myalgias, depression, suicidal ideation, headache, blurred vision, increase in liver function test and increase in lipids. The iPledge program brochure was provided and the contents discussed with the patient. The patient was counseled that they cannot give blood while on isotretinoin. Advised against tattoos and waxing. No personal or family history of inflammatory bowel disease or hypertriglyceridemia known to patient. Reviewed need to avoid alcohol on medication. The iPledge program  consent was obtained. Patient counseled that if they wear contacts, the eyes may become too dry to tolerate. Recommend follow up with eye doctor if this occurs.    Discussed need for sun protection, at least SPF 30+.  Next visit: baseline labs including qualitative hCG, CBC, BUN/Cr, fasting lipids and AST/ALT will be obtained.   Patient's iPledge # is [id number].   The patient will stop all other acne medications prior to next visit.  Total dose: 0mg/kg  Start doxycycline 100 mg po BID for now until we can begin isotretinoin, then will plan to discontinue  Hold tranexamic acid until after course of isotretinoin is complete  Start tretinoin 0.05% cream, apply topically qPM for now until we can begin isotretinoin    CC Dr. Nguyen on close of this encounter.  Follow-up in 1-2 months, earlier for new or changing lesions.       Staff Involved:  Staff/Scribe    Scribe Disclosure:  KYLIE, Flex Gan, am serving as a scribe to document services personally performed by Chhaya Estes PA-C, based on data collection and the provider's statements to me.     Provider Disclosure:   The documentation recorded by the scribe accurately reflects the services I personally performed and the decisions made by me.    All risks, benefits and alternatives were discussed with patient.  Patient is in agreement and understands the assessment and plan.  All questions were answered.    Chhaya Estes PA-C, MPAS  [facility] [place]: Phone: [phone], Fax: [phone]  [facility]: Phone: [phone],  Fax: [phone]

## 2020-01-13 NOTE — NURSING NOTE
Dermatology Rooming Note    Edwina Wynne's goals for this visit include:   Chief Complaint   Patient presents with     Acne     Edwina is here today to be seen for acne.      JEN Diaz

## 2020-01-13 NOTE — PROGRESS NOTES
"[facility] Dermatology Note      Dermatology Problem List:  1. Hyperkeratosis of the elbows   2. Acne vulgaris  -Current tx: plan to start isotretinoin 40 mg every day next month - 2/2020, doxycycline 100 mg BID, tretinoin 0.05% cream  -Previous tx: tretinoin 0.1% cream, clindamycin, BPO    Encounter Date: Jan 13, 2020    CC:  Chief Complaint   Patient presents with     Acne     Edwina is here today to be seen for acne.          History of Present Illness:  Ms. Edwina Wynne is a 49 year old female who is a return patient to the clinic and presents for acne. The patient was last seen on 10/06/2017 by Dr. Sheehan when a dermatofibroma was identified and Urea cream 40% was started for hyperkeratosis of the elbows. At today's visit, the patient notes she got tretinoin from [place] when she went there and has been using this for quite some time but it is not that effective. She is unsure whether the tretinoin works as she still notices new acne. The patient states her period is irregular and believes she is in a pre-menopausal stage. Notes that I see her daughter and prescribed her daughter Accutane and she is particularly interested in starting this because her daughter's skin looks so good. She has not been on isotretinoin before. Notes deep \"underground\" cystic type acne on the lower face. She notes that she has some dry skin on the back of her neck and upper chest which cleared up with hydrocortisone cream. The patient denies body acne bad notes it is mostly on the lower face and forehead. She states that she has been getting acne in the area for many years, but notes that it has worsened lately. The patient notes she had a tubal ligation many years ago but then had the tubal ligation reversed. She is not currently on any form of contraception but states she does not believe she can get pregnant. She is perimenopausal but still gets periods. She says they are very irregular however. " She was going to start tranexamic acid for heavy menstrual flow, but has not started this yet and won't if it will interfere with her ability to be on isotretinoin.  has not had vasectomy. She has not had a hysterectomy - partial or full.  She denies hx of anxiety/depression and IBD.  The patient denies painful, itching, tingling or bleeding lesions unless otherwise noted.    Past Medical History:   Patient Active Problem List   Diagnosis     Tubal ligation status     Ventral hernia, recurrent     Essential hypertension, benign     Tubal occlusion     Choledocholithiasis with acute cholecystitis     Past Medical History:   Diagnosis Date     Anemia     PREGNANCY     Herpes simplex without mention of complication      HSV      Hypertension     Gestational     PONV (postoperative nausea and vomiting)     POST C SECTION     Pre-eclampsia      Past Surgical History:   Procedure Laterality Date     C BREAST PROSTHESIS, NOS        SECTION       HERNIORRHAPHY VENTRAL  10/26/2012    Procedure: HERNIORRHAPHY VENTRAL;  Open Ventral Hernia Repair  ;  Surgeon: Shaun Clinton MD;  Location: [facility] OR     LAPAROSCOPIC CHOLECYSTECTOMY N/A 2018    Procedure: LAPAROSCOPIC CHOLECYSTECTOMY;  LAPAROSCOPIC CHOLECYSTECTOMY .;  Surgeon: Brandon Reagan MD;  Location: [facility] OR     LAPAROSCOPY DIAGNOSTIC (GYN) Bilateral 2016    Procedure: LAPAROSCOPY DIAGNOSTIC (GYN);  Surgeon: Ventura Flower MD;  Location: [facility]     LAPAROTOMY MINI, TUBAL LIGATION (POST PARTUM), COMBINED  2012    Procedure:COMBINED LAPAROTOMY MINI, TUBAL LIGATION (POST PARTUM); Surgeon:RAVEN YARBROUGH; [facility]     TUBOPLASTY REANASTOMOSIS Bilateral 2016    Procedure: TUBOPLASTY REANASTOMOSIS;  Surgeon: Ventura Flower MD;  Location: [facility]       Social History:   reports that she quit smoking about 20 years ago. Her smoking use included cigarettes. She has a 15.00 pack-year smoking history. She has never  used smokeless tobacco. She reports that she does not drink alcohol or use drugs.    Family History:  Family History   Problem Relation Age of Onset     Hypertension Father      Hypertension Brother      Cerebrovascular Disease Maternal Grandmother 50     Macular Degeneration Paternal Grandmother      Eye Disorder Other         Great aunt, retinitis pigmentosa     Asthma No family hx of      C.A.D. No family hx of      Diabetes No family hx of      Breast Cancer No family hx of      Cancer - colorectal No family hx of      Prostate Cancer No family hx of      Glaucoma No family hx of        Medications:  Current Outpatient Medications   Medication Sig Dispense Refill     Calcium Carbonate-Vitamin D (CALCIUM + D PO) Take 1 tablet by mouth daily.       clobetasol (TEMOVATE) 0.05 % external solution   2     IBUPROFEN PO Take 600 mg by mouth every 4 hours as needed for moderate pain       ketoconazole (NIZORAL) 2 % shampoo   11     tranexamic acid (LYSTEDA) 650 MG tablet Take 2 tablets (1,300 mg) by mouth 3 times daily Take for maximum 5 days during menses 30 tablet 3     tretinoin (RETIN-A) 0.025 % cream Use every night 45 g 6       No Known Allergies    Review of Systems:  -Constitutional: The patient denies fatigue, fevers, chills, unintended weight loss, and night sweats.  -HEENT: Patient denies nonhealing oral sores.  -Skin: As above in HPI. No additional skin concerns.    Physical exam:  Vitals: There were no vitals taken for this visit.  GEN: This is a well developed, well-nourished male in no acute distress, in a pleasant mood.    SKIN: Acne exam, which includes the face, neck, upper central chest, and upper central back was performed.  -Multiple nodules with few small pustules on the right chin and lower face, x1 papule on the glabella  -chest/back clear  -No other lesions of concern on areas examined.       Impression/Plan:  1. Acne vulgaris - likely hormonal due to location of acne and patient's  perimenopausal state- discussed that isotretinoin can help treat this kind of acne however you may have recurrent acne following discontinuation    Educated on the etiology    Discussed treatment options including topical medications, oral antibiotics, spironolactone and accutane    Discussed need for two forms of birth control while on accutane as patient is unwilling to be abstinent and although she feels she cannot get pregnant we still must follow the iPledge guidelines as she has not fully underwent menopasue at this time and has no other contraceptive methods. Discussed primary and secondary forms of contraception.     Urine pregnancy test will be obtained after patient obtains IUD, and at that time we will register her with iPledge.  Will start isotretinoin 40mg 1 month after patient's initial pregnancy test. Goal dose is 150-220mg/kg for this patient. We will not have initial hcg obtained until patient has procedure for IUD placement.  Method of contraception includes: IUD (once she has this placed) and male condoms  Discussion of the risks and side effects of isotretinoin including but not limited to mucocutaneous dryness, arthralgias, myalgias, depression, suicidal ideation, headache, blurred vision, increase in liver function test and increase in lipids. The iPledge program brochure was provided and the contents discussed with the patient. The patient was counseled that they cannot give blood while on isotretinoin. Advised against tattoos and waxing. No personal or family history of inflammatory bowel disease or hypertriglyceridemia known to patient. Reviewed need to avoid alcohol on medication. The iPledge program consent was obtained. Patient counseled that if they wear contacts, the eyes may become too dry to tolerate. Recommend follow up with eye doctor if this occurs.    Discussed need for sun protection, at least SPF 30+.  Next visit: baseline labs including qualitative hCG, CBC, BUN/Cr, fasting  lipids and AST/ALT will be obtained.   Patient's iPledge # is [id number].   The patient will stop all other acne medications prior to next visit.  Total dose: 0mg/kg  Start doxycycline 100 mg po BID for now until we can begin isotretinoin, then will plan to discontinue  Hold tranexamic acid until after course of isotretinoin is complete  Start tretinoin 0.05% cream, apply topically qPM for now until we can begin isotretinoin    CC Dr. Nguyen on close of this encounter.  Follow-up in 1-2 months, earlier for new or changing lesions.       Staff Involved:  Staff/Scribe    Scribe Disclosure:  I, Flex Gan, am serving as a scribe to document services personally performed by Chhaya Estes PA-C, based on data collection and the provider's statements to me.     Provider Disclosure:   The documentation recorded by the scribe accurately reflects the services I personally performed and the decisions made by me.    All risks, benefits and alternatives were discussed with patient.  Patient is in agreement and understands the assessment and plan.  All questions were answered.    Chhaya Estes PA-C, MPAS  [facility] [place]: Phone: [phone], Fax: [phone]  [facility]: Phone: [phone],  Fax: [phone]

## 2020-01-29 ENCOUNTER — OFFICE VISIT (OUTPATIENT)
Dept: OBGYN | Facility: CLINIC | Age: 50
End: 2020-01-29
Payer: COMMERCIAL

## 2020-01-29 ENCOUNTER — TELEPHONE (OUTPATIENT)
Dept: DERMATOLOGY | Facility: CLINIC | Age: 50
End: 2020-01-29

## 2020-01-29 ENCOUNTER — ANCILLARY PROCEDURE (OUTPATIENT)
Dept: ULTRASOUND IMAGING | Facility: CLINIC | Age: 50
End: 2020-01-29
Attending: OBSTETRICS & GYNECOLOGY
Payer: COMMERCIAL

## 2020-01-29 VITALS
DIASTOLIC BLOOD PRESSURE: 85 MMHG | HEIGHT: 66 IN | WEIGHT: 173.3 LBS | HEART RATE: 78 BPM | SYSTOLIC BLOOD PRESSURE: 131 MMHG | BODY MASS INDEX: 27.85 KG/M2

## 2020-01-29 DIAGNOSIS — Z51.81 MEDICATION MONITORING ENCOUNTER: Primary | ICD-10-CM

## 2020-01-29 DIAGNOSIS — D21.9 MYOMA: Primary | ICD-10-CM

## 2020-01-29 DIAGNOSIS — N92.1 MENORRHAGIA WITH IRREGULAR CYCLE: ICD-10-CM

## 2020-01-29 PROCEDURE — 76856 US EXAM PELVIC COMPLETE: CPT

## 2020-01-29 PROCEDURE — G0463 HOSPITAL OUTPT CLINIC VISIT: HCPCS | Mod: 25,ZF

## 2020-01-29 ASSESSMENT — MIFFLIN-ST. JEOR: SCORE: 1437.83

## 2020-01-29 NOTE — PROGRESS NOTES
[facility]  Gynecology Visit    CC: follow up ultrasound     HPI:    Edwina Wynne is a 49 year old , here for follow up after repeat pelvic ultrasound. Patient is doing well. She continues to have irregular menses. When she is bleeding, describes as a few days of very heavy bleeding. She says bleeding is manageable. Just wants to make sure everything is okay given her sister's history of leiomyosarcoma.     Obstetrics History:  OB History    Para Term  AB Living   4 3 3 0 1 3   SAB TAB Ectopic Multiple Live Births   0 0 0 0 1      # Outcome Date GA Lbr Laurent/2nd Weight Sex Delivery Anes PTL Lv   4 Term 12 39w0d 03:10 / 00:24 3.204 kg (7 lb 1 oz) F  EPI N KARINA      Name: MARYBETH WYNNE      Apgar1: 9  Apgar5: 9   3 Term 09/15/10 37w0d  3.459 kg (7 lb 10 oz) F CS-LTranv Spinal        Birth Comments: breech, complicated gestional hypertension      Name: Dara   2 AB 2007              Birth Comments: no complications   1 Term 94 40w4d 04:00 3.459 kg (7 lb 10 oz) F  EPI        Birth Comments: delivery c/b fever     Gynecologic History:  - LMP: Patient's last menstrual period was 2019 (approximate).  - Last Pap: NILM, HPV: negative (2016)  - Denies any history of abnormal pap smears  - Denies prior cervical surgery or procedures  - Contraception:  None  - Sexual Activity: male partner    Past Medical History:  Denies    Past Surgical History:  Past Surgical History:   Procedure Laterality Date     C BREAST PROSTHESIS, NOS  2004      SECTION       HERNIORRHAPHY VENTRAL  10/26/2012    Procedure: HERNIORRHAPHY VENTRAL;  Open Ventral Hernia Repair  ;  Surgeon: Shaun Clinton MD;  Location: [facility] OR     LAPAROSCOPIC CHOLECYSTECTOMY N/A 2018    Procedure: LAPAROSCOPIC CHOLECYSTECTOMY;  LAPAROSCOPIC CHOLECYSTECTOMY .;  Surgeon: Brandon Reagan MD;  Location: [facility] OR     LAPAROSCOPY DIAGNOSTIC (GYN) Bilateral 2016     Procedure: LAPAROSCOPY DIAGNOSTIC (GYN);  Surgeon: Ventura Flower MD;  [facility]     LAPAROTOMY MINI, TUBAL LIGATION (POST PARTUM), COMBINED  5/24/2012    Procedure:COMBINED LAPAROTOMY MINI, TUBAL LIGATION (POST PARTUM); Surgeon:RAVEN YARBROUGH; Location: [facility]     TUBOPLASTY REANASTOMOSIS Bilateral 9/27/2016    Procedure: TUBOPLASTY REANASTOMOSIS;  Surgeon: Ventura Flower MD;  Location: [facility]       Current Medications:  Prior to Admission medications    Medication Sig Last Dose Taking? Auth Provider   Calcium Carbonate-Vitamin D (CALCIUM + D PO) Take 1 tablet by mouth daily. Taking  Reported, Patient   clobetasol (TEMOVATE) 0.05 % external solution  Taking  Reported, Patient   doxycycline monohydrate (MONODOX) 100 MG capsule Take 1 capsule (100 mg) by mouth 2 times daily   Chhaya Estes PA-C   IBUPROFEN PO Take 600 mg by mouth every 4 hours as needed for moderate pain Taking  Reported, Patient   ketoconazole (NIZORAL) 2 % shampoo  Taking  Reported, Patient   tranexamic acid (LYSTEDA) 650 MG tablet Take 2 tablets (1,300 mg) by mouth 3 times daily Take for maximum 5 days during menses Taking  Elena Oliveros MD   tretinoin (RETIN-A) 0.05 % external cream Apply topically At Bedtime   Chhaya Estes PA-C       Allergies:  Patient has no known allergies.    Social History:   Social History     Socioeconomic History     Marital status:      Spouse name: Not on file     Number of children: Not on file     Years of education: Not on file     Highest education level: Not on file   Occupational History     Not on file   Social Needs     Financial resource strain: Not on file     Food insecurity:     Worry: Not on file     Inability: Not on file     Transportation needs:     Medical: Not on file     Non-medical: Not on file   Tobacco Use     Smoking status: Former Smoker     Packs/day: 1.00     Years: 15.00     Pack years: 15.00     Types: Cigarettes     Last attempt to quit: 1/1/2000      "Years since quittin.0     Smokeless tobacco: Never Used   Substance and Sexual Activity     Alcohol use: No     Alcohol/week: 0.0 standard drinks     Comment: occas     Drug use: No     Sexual activity: Yes     Partners: Male     Birth control/protection: Female Surgical     Comment: tubal ligation   Lifestyle     Physical activity:     Days per week: Not on file     Minutes per session: Not on file     Stress: Not on file   Relationships     Social connections:     Talks on phone: Not on file     Gets together: Not on file     Attends [employer] service: Not on file     Active member of club or organization: Not on file     Attends meetings of clubs or organizations: Not on file     Relationship status: Not on file     Intimate partner violence:     Fear of current or ex partner: Not on file     Emotionally abused: Not on file     Physically abused: Not on file     Forced sexual activity: Not on file   Other Topics Concern     Parent/sibling w/ CABG, MI or angioplasty before 65F 55M? Not Asked   Social History Narrative     Not on file       Family History:  Sister has leiomyosarcoma  Denies any family history of bleeding or clotting disorders   Family History   Problem Relation Age of Onset     Hypertension Father      Hypertension Brother      Cerebrovascular Disease Maternal Grandmother 50     Macular Degeneration Paternal Grandmother      Eye Disorder Other         Great aunt, retinitis pigmentosa     Asthma No family hx of      C.A.D. No family hx of      Diabetes No family hx of      Breast Cancer No family hx of      Cancer - colorectal No family hx of      Prostate Cancer No family hx of      Glaucoma No family hx of        ROS:  10-point ROS negative except as in HPI     Physical Exam  /85 (BP Location: Left arm, Patient Position: Chair)   Pulse 78   Ht 1.676 m (5' 6\")   Wt 78.6 kg (173 lb 4.8 oz)   BMI 27.97 kg/m    Gen: Well-appearing, NAD  HEENT: Normocephalic, atraumatic  CV:  Regular " "rate  Pulm: Non labored respirations    I have reviewed labs and imaging    Imaging:   Repeat pelvic US    Uterine findings:              Presence: Visible Size: Normal 7.0 x 6.5 x 6.2 cm.  Endometrium = 12.8 mm.              Cx length = 30.1 mm.                 Flexion:  Anteverted    Position: Midline          Margins: Smooth         Shape: Normal              Contour: Regular        Texture: Homogeneous          Cavity: Normal            Masses: Abnormal Anterior myoma measuring 3.2 x 3.0 x 3.0cm     Pelvic findings:               Right Adnexa: Normal              Left Adnexa: Normal              Bladder:  Normal                                                           Cul - de - sac fluid: None  Ovarian follicles:              Right ovary:  2.3 x 2.5 x 1.6cm.              Left ovary:  2.7 x 2.0 x 1.3cm.    Assessment/Plan  Edwina Wynne is a 49 year old  female here for follow up ultrasound. Endometrial biopsy performed that was negative for malignancy or hyperplasia (10/2/19) at that time she had ultrasound that showed myoma with ill defined borders, recommended repeat ultrasound in 3 months. Ultrasound completed today that showed stable anterior myoma measuring 3.2 cm. Discussed with patient that with family history of leiomyosarcoma and fibroid that was initially said to have \"ill defined border\", I cannot rule out malignancy. Overall reassured given that size of myoma is unchanged. Due to patient's significant anxiety over sisters recent diagnosis and abnormal ultrasound findings hysterectomy offered. Patient declines at this time. She elects to follow up with repeat ultrasound in three months. Reviewed return precautions with patient.     -- Return for repeat pelvic US in 3 months  -- Continue annual preventative exams.     Patient staffed with Dr. Edna Pinedo MD  OB/GYN Resident, PGY-3    The Patient was seen in [facility] by Dr. Pinedo.   I reviewed the " history & exam. Assessment and plan were jointly made.   Evelyne Bishop MD, MPH

## 2020-01-29 NOTE — TELEPHONE ENCOUNTER
[facility] Call Center    Phone Message    May a detailed message be left on voicemail: yes    Reason for Call: Other: pt stated her and Chhaya discussed acne options, and pt would like to go the spironolactone route 1st, before trying accutane, pt stated she would like to start the process and have blood work ordered, please call pt thanks     Action Taken: Message routed to:  [facility] ([facility] derm

## 2020-01-30 NOTE — TELEPHONE ENCOUNTER
Called pt and LVM. I wanted to let her know the message below. Clinic number provided.  JEN Diaz

## 2020-09-08 ENCOUNTER — OFFICE VISIT (OUTPATIENT)
Dept: OPHTHALMOLOGY | Facility: CLINIC | Age: 50
End: 2020-09-08
Payer: COMMERCIAL

## 2020-09-08 DIAGNOSIS — H52.03 HYPERMETROPIA OF BOTH EYES: Primary | ICD-10-CM

## 2020-09-08 RX ORDER — CETIRIZINE HYDROCHLORIDE 10 MG/1
10 TABLET ORAL DAILY
COMMUNITY
End: 2022-05-11

## 2020-09-08 RX ORDER — MULTIPLE VITAMINS W/ MINERALS TAB 9MG-400MCG
1 TAB ORAL DAILY
COMMUNITY

## 2020-09-08 RX ORDER — MULTIVITAMIN WITH IRON
1 TABLET ORAL DAILY
COMMUNITY
End: 2022-05-11

## 2020-09-08 ASSESSMENT — REFRACTION_CURRENTRX
OD_BASECURVE: 8.5
OS_ADD: HI
OD_DIAMETER: 14.2
OD_BRAND: B&L ULTRA
OS_BASECURVE: 8.5
OD_SPHERE: +1.50
OS_SPHERE: +3.00
OS_BASECURVE: 8.5
OS_DIAMETER: 14.2
OS_ADD: HI
OD_BASECURVE: 8.5
OS_DIAMETER: 14.2
OD_SPHERE: +2.00
OD_BRAND: B&L ULTRA
OS_BRAND: B&L ULTRA FOR PRESBYOPIA
OS_BRAND: B&L ULTRA FOR PRESBYOPIA
OD_DIAMETER: 14.2
OS_SPHERE: +1.75

## 2020-09-08 ASSESSMENT — VISUAL ACUITY
CORRECTION_TYPE: CONTACTS
OS_CC: 20/20
OS_SC: J1 OU
OD_CC: 20/20
OD_SC: J1 OU
OD_CC+: -1
METHOD: SNELLEN - LINEAR

## 2020-09-08 ASSESSMENT — TONOMETRY
OS_IOP_MMHG: 18
OD_IOP_MMHG: 17
IOP_METHOD: ICARE

## 2020-09-08 ASSESSMENT — SLIT LAMP EXAM - LIDS
COMMENTS: NORMAL
COMMENTS: NORMAL

## 2020-09-08 ASSESSMENT — REFRACTION_MANIFEST
OS_CYLINDER: +1.00
OS_AXIS: 005
OD_SPHERE: +1.50
OS_ADD: +1.50
OD_AXIS: 165
OD_CYLINDER: +1.00
OD_ADD: +1.50
OS_SPHERE: +1.75

## 2020-09-08 ASSESSMENT — CONF VISUAL FIELD
METHOD: COUNTING FINGERS
OD_NORMAL: 1
OS_NORMAL: 1

## 2020-09-08 ASSESSMENT — EXTERNAL EXAM - LEFT EYE: OS_EXAM: NORMAL

## 2020-09-08 ASSESSMENT — EXTERNAL EXAM - RIGHT EYE: OD_EXAM: NORMAL

## 2020-09-08 ASSESSMENT — CUP TO DISC RATIO
OS_RATIO: 0.25
OD_RATIO: 0.25

## 2020-09-08 NOTE — PROGRESS NOTES
"History  HPI     COMPREHENSIVE EYE EXAM     In both eyes.  Charactertized as  blurred vision.  Context:  near vision.  Associated symptoms include dryness.  Negative for eye pain, tearing, flashes and floaters.  Pain was noted as 0/10.              Comments     Patient notes that NVA is blurred having to put reading gls on over gls and feels that CTL could be changed as well. CTL are \"alright\", more comfortable than the last pair but at the end of the day eyes are dry.     Iris Arroyo COT September 8, 2020 10:15 AM            Last edited by Ciara Arroyo on 9/8/2020 10:15 AM. (History)          Assessment/Plan  (H52.03) Hypermetropia of both eyes  (primary encounter diagnosis)  Comment: Hyperopia both eyes with presbyopia, monovision right eye distance  Plan: REFRACTION, C CONTACT LENS FITTING COSMETIC LVL 1 - ADULT         Educated patient on condition and clinical findings. Dispensed spectacle prescription for full time wear. Monitor annually.   Dispensed trial lenses and finalized contact lens prescription for 2 years.    Return to clinic in 1 year for comprehensive eye exam.    Contact Lens Billing  V-Code:  - Soft spherical, modified monovision  Final Contact Lens Rx       Brand Base Curve Diameter Sphere Add    Right B&L Ultra 8.5 14.2 +2.00     Left B&L Ultra for Presbyopia 8.5 14.2 +3.00 Hi    Expiration Date:  9/9/2022    Replacement:  Monthly    Wearing Schedule:  Daily wear         CL Fitting Fee: $75    These are for cosmetic contact lenses.    Encounter Diagnosis   Name Primary?     Hypermetropia of both eyes Yes      Complete documentation of historical and exam elements from today's encounter can  be found in the full encounter summary report (not reduplicated in this progress  note). I personally obtained the chief complaint(s) and history of present illness. I  confirmed and edited as necessary the review of systems, past medical/surgical  history, family history, social history, and " examination findings as documented by  others; and I examined the patient myself. I personally reviewed the relevant tests,  images, and reports as documented above. I formulated and edited as necessary the  assessment and plan and discussed the findings and management plan with the  patient and family.    Hao Barahona OD, FAAO

## 2020-09-08 NOTE — NURSING NOTE
"Chief Complaints and History of Present Illnesses   Patient presents with     COMPREHENSIVE EYE EXAM     Chief Complaint(s) and History of Present Illness(es)     COMPREHENSIVE EYE EXAM     Laterality: both eyes    Quality: blurred vision    Context: near vision    Associated symptoms: dryness.  Negative for eye pain, tearing, flashes and floaters    Pain scale: 0/10              Comments     Patient notes that NVA is blurred having to put reading gls on over gls and feels that CTL could be changed as well. CTL are \"alright\", more comfortable than the last pair but at the end of the day eyes are dry.     Iris SEVILLA September 8, 2020 10:15 AM                  "

## 2020-12-13 ENCOUNTER — HEALTH MAINTENANCE LETTER (OUTPATIENT)
Age: 50
End: 2020-12-13

## 2021-01-15 ENCOUNTER — TELEPHONE (OUTPATIENT)
Dept: OBGYN | Facility: CLINIC | Age: 51
End: 2021-01-15

## 2021-01-15 DIAGNOSIS — N92.1 MENORRHAGIA WITH IRREGULAR CYCLE: Primary | ICD-10-CM

## 2021-01-25 ENCOUNTER — DOCUMENTATION ONLY (OUTPATIENT)
Dept: CARE COORDINATION | Facility: CLINIC | Age: 51
End: 2021-01-25

## 2021-02-05 ENCOUNTER — TELEPHONE (OUTPATIENT)
Dept: GASTROENTEROLOGY | Facility: OUTPATIENT CENTER | Age: 51
End: 2021-02-05

## 2021-02-05 ENCOUNTER — OFFICE VISIT (OUTPATIENT)
Dept: INTERNAL MEDICINE | Facility: CLINIC | Age: 51
End: 2021-02-05
Attending: INTERNAL MEDICINE
Payer: COMMERCIAL

## 2021-02-05 VITALS
HEART RATE: 80 BPM | BODY MASS INDEX: 26.52 KG/M2 | HEIGHT: 66 IN | WEIGHT: 165 LBS | SYSTOLIC BLOOD PRESSURE: 122 MMHG | DIASTOLIC BLOOD PRESSURE: 79 MMHG

## 2021-02-05 DIAGNOSIS — R92.30 DENSE BREAST TISSUE ON MAMMOGRAM: ICD-10-CM

## 2021-02-05 DIAGNOSIS — Z00.00 PREVENTATIVE HEALTH CARE: Primary | ICD-10-CM

## 2021-02-05 DIAGNOSIS — Z00.00 ROUTINE GENERAL MEDICAL EXAMINATION AT A HEALTH CARE FACILITY: ICD-10-CM

## 2021-02-05 PROCEDURE — G0463 HOSPITAL OUTPT CLINIC VISIT: HCPCS

## 2021-02-05 PROCEDURE — 99396 PREV VISIT EST AGE 40-64: CPT | Performed by: INTERNAL MEDICINE

## 2021-02-05 ASSESSMENT — PATIENT HEALTH QUESTIONNAIRE - PHQ9
5. POOR APPETITE OR OVEREATING: SEVERAL DAYS
SUM OF ALL RESPONSES TO PHQ QUESTIONS 1-9: 3

## 2021-02-05 ASSESSMENT — ANXIETY QUESTIONNAIRES
7. FEELING AFRAID AS IF SOMETHING AWFUL MIGHT HAPPEN: SEVERAL DAYS
GAD7 TOTAL SCORE: 8
5. BEING SO RESTLESS THAT IT IS HARD TO SIT STILL: SEVERAL DAYS
2. NOT BEING ABLE TO STOP OR CONTROL WORRYING: SEVERAL DAYS
3. WORRYING TOO MUCH ABOUT DIFFERENT THINGS: SEVERAL DAYS
6. BECOMING EASILY ANNOYED OR IRRITABLE: MORE THAN HALF THE DAYS
1. FEELING NERVOUS, ANXIOUS, OR ON EDGE: SEVERAL DAYS

## 2021-02-05 ASSESSMENT — MIFFLIN-ST. JEOR: SCORE: 1391.22

## 2021-02-05 NOTE — LETTER
2021       RE: Edwina Wynne  [address]  [place] [facility] [address]     Dear Colleague,    Thank you for referring your patient, Edwina Wynne, to the [facility] [place] [facility]. Please see a copy of my visit note below.     SUBJECTIVE:   CC: Edwina Wynne is an 50 year old woman who presents for preventive health visit.       Patient has been advised of split billing requirements and indicates understanding: Yes  Healthy Habits:    Do you get at least three servings of calcium containing foods daily (dairy, green leafy vegetables, etc.)? yes    Amount of exercise or daily activities, outside of work: treadmill - 5 times a week, 2-3 miles    Problems taking medications regularly No    Medication side effects: No    Have you had an eye exam in the past two years? yes    Do you see a dentist twice per year? yes    Do you have sleep apnea, excessive snoring or daytime drowsiness?no      -------------------------------------    Today's PHQ-2 Score:   PHQ-2 (  [employer]) 2020 10/2/2019   Q1: Little interest or pleasure in doing things 0 0   Q2: Feeling down, depressed or hopeless 0 0   PHQ-2 Score 0 0   Q1: Little interest or pleasure in doing things - -   Q2: Feeling down, depressed or hopeless - -   PHQ-2 Score - -       Abuse: Current or Past(Physical, Sexual or Emotional)- No  Do you feel safe in your environment? Yes        Social History     Tobacco Use     Smoking status: Former Smoker     Packs/day: 1.00     Years: 15.00     Pack years: 15.00     Types: Cigarettes     Quit date: 2000     Years since quittin.1     Smokeless tobacco: Never Used   Substance Use Topics     Alcohol use: No     Alcohol/week: 0.0 standard drinks     Comment: occas     If you drink alcohol do you typically have >3 drinks per day or >7 drinks per week? No                     Reviewed orders with  patient.  Reviewed health maintenance and updated orders accordingly - Yes  Labs reviewed in EPIC    Breast CA Risk Screening:    Mammogram Screening: Recommended annual mammography  Pertinent mammograms are reviewed under the imaging tab.    Pertinent mammograms are reviewed under the imaging tab.  History of abnormal Pap smear: NO - age 30-65 PAP every 5 years with negative HPV co-testing recommended  PAP / HPV Latest Ref Rng & Units 2016   PAP - NIL NIL   HPV 16 DNA NEG Negative -   HPV 18 DNA NEG Negative -   OTHER HR HPV NEG Negative -     Reviewed and updated as needed this visit by clinical staff   Allergies  Meds              Reviewed and updated as needed this visit by Provider                Past Medical History:   Diagnosis Date     Anemia     PREGNANCY     Herpes simplex without mention of complication      HSV      Hypertension     Gestational     PONV (postoperative nausea and vomiting)     POST C SECTION     Pre-eclampsia       Past Surgical History:   Procedure Laterality Date     C BREAST PROSTHESIS, NOS        SECTION       HERNIORRHAPHY VENTRAL  10/26/2012    Procedure: HERNIORRHAPHY VENTRAL;  Open Ventral Hernia Repair  ;  Surgeon: Shaun Clinton MD;  Location: [facility]     LAPAROSCOPIC CHOLECYSTECTOMY N/A 2018    Procedure: LAPAROSCOPIC CHOLECYSTECTOMY;  LAPAROSCOPIC CHOLECYSTECTOMY .;  Surgeon: Brandon Reagan MD;  Location: [facility]     LAPAROSCOPY DIAGNOSTIC (GYN) Bilateral 2016    Procedure: LAPAROSCOPY DIAGNOSTIC (GYN);  Surgeon: Ventura Flower MD;  Location: [facility]     LAPAROTOMY MINI, TUBAL LIGATION (POST PARTUM), COMBINED  2012    Procedure:COMBINED LAPAROTOMY MINI, TUBAL LIGATION (POST PARTUM); Surgeon:RAVEN YARBROUGH; Location[facility]     TUBOPLASTY REANASTOMOSIS Bilateral 2016    Procedure: TUBOPLASTY REANASTOMOSIS;  Surgeon: Ventura Flower MD;  Location: [facility]       ROS:  CONSTITUTIONAL: NEGATIVE for fever, chills,  "change in weight  INTEGUMENTARY/SKIN: NEGATIVE for worrisome rashes, moles or lesions  EYES: NEGATIVE for vision changes or irritation  ENT: NEGATIVE for ear, mouth and throat problems  RESP: NEGATIVE for significant cough or SOB  BREAST: NEGATIVE for masses, tenderness or discharge  CV: NEGATIVE for chest pain, palpitations or peripheral edema  GI: NEGATIVE for nausea, abdominal pain, heartburn, or change in bowel habits  : NEGATIVE for unusual urinary or vaginal symptoms. No vaginal bleeding.  MUSCULOSKELETAL: NEGATIVE for significant arthralgias or myalgia  NEURO: NEGATIVE for weakness, dizziness or paresthesias  PSYCHIATRIC: NEGATIVE for changes in mood or affect     OBJECTIVE:   /79   Pulse 80   Ht 1.67 m (5' 5.75\")   Wt 74.8 kg (165 lb)   LMP  (LMP Unknown)   Breastfeeding No   BMI 26.83 kg/m    EXAM:  GENERAL: healthy, alert and no distress  EYES: Eyes grossly normal to inspection, PERRL and conjunctivae and sclerae normal  HENT: normal cephalic/atraumatic  NECK: no adenopathy, no asymmetry, masses, or scars and thyroid normal to palpation  RESP: lungs clear to auscultation - no rales, rhonchi or wheezes  CV: regular rate and rhythm, normal S1 S2, no S3 or S4, no murmur, click or rub, no peripheral edema and peripheral pulses strong  ABDOMEN: soft, nontender, no hepatosplenomegaly, no masses and bowel sounds normal  MS: no gross musculoskeletal defects noted, no edema  SKIN: no suspicious lesions or rashes  NEURO: Normal strength and tone, mentation intact and speech normal  PSYCH: mentation appears normal, affect normal/bright    Diagnostic Test Results:  Labs reviewed in Epic    ASSESSMENT/PLAN:   1. Preventative health care  Discussed colorectal screening with patient. Recommend colonoscopy. Also recommend lipid screening as well as screening for diabetes. Patient is up-to-date on Pap smear.  - GASTROENTEROLOGY ADULT REF PROCEDURE ONLY; Future  - CBC with platelets; Future  - Ferritin; " "Future  - Lipid Profile; Future  - Basic metabolic panel; Future  - Hepatic Panel; Future    2. Dense breast tissue on mammogram  Discussed options for breast cancer screening with patient. Recommend mammography.  - MA Screen Bilateral w/Anand; Future    3. Routine general medical examination at a health care facility  Reviewed vaccinations. Recommend Tdap. Given recent vaccination with COVID-19 vaccine, recommend postponing further vaccinations until 2 weeks after the booster shot.      Patient has been advised of split billing requirements and indicates understanding: Yes  COUNSELING:   Reviewed preventive health counseling, as reflected in patient instructions       Regular exercise       Healthy diet/nutrition       Vision screening    Estimated body mass index is 26.83 kg/m  as calculated from the following:    Height as of this encounter: 1.67 m (5' 5.75\").    Weight as of this encounter: 74.8 kg (165 lb).        She reports that she quit smoking about 21 years ago. Her smoking use included cigarettes. She has a 15.00 pack-year smoking history. She has never used smokeless tobacco.      Counseling Resources:  ATP IV Guidelines  Pooled Cohorts Equation Calculator  Breast Cancer Risk Calculator  BRCA-Related Cancer Risk Assessment: FHS-7 Tool  FRAX Risk Assessment  ICSI Preventive Guidelines  Dietary Guidelines for Americans, 2010  [employer]'s MyPlate  ASA Prophylaxis  Lung CA Screening    Blanca Heard MD  [facility] [place]      "

## 2021-02-05 NOTE — PROGRESS NOTES
SUBJECTIVE:   CC: Edwina Wynne is an 50 year old woman who presents for preventive health visit.       Patient has been advised of split billing requirements and indicates understanding: Yes  Healthy Habits:    Do you get at least three servings of calcium containing foods daily (dairy, green leafy vegetables, etc.)? yes    Amount of exercise or daily activities, outside of work: treadmill - 5 times a week, 2-3 miles    Problems taking medications regularly No    Medication side effects: No    Have you had an eye exam in the past two years? yes    Do you see a dentist twice per year? yes    Do you have sleep apnea, excessive snoring or daytime drowsiness?no      -------------------------------------    Today's PHQ-2 Score:   PHQ-2 (  [employer]) 2020 10/2/2019   Q1: Little interest or pleasure in doing things 0 0   Q2: Feeling down, depressed or hopeless 0 0   PHQ-2 Score 0 0   Q1: Little interest or pleasure in doing things - -   Q2: Feeling down, depressed or hopeless - -   PHQ-2 Score - -       Abuse: Current or Past(Physical, Sexual or Emotional)- No  Do you feel safe in your environment? Yes        Social History     Tobacco Use     Smoking status: Former Smoker     Packs/day: 1.00     Years: 15.00     Pack years: 15.00     Types: Cigarettes     Quit date: 2000     Years since quittin.1     Smokeless tobacco: Never Used   Substance Use Topics     Alcohol use: No     Alcohol/week: 0.0 standard drinks     Comment: occas     If you drink alcohol do you typically have >3 drinks per day or >7 drinks per week? No                     Reviewed orders with patient.  Reviewed health maintenance and updated orders accordingly - Yes  Labs reviewed in [facility]    Breast CA Risk Screening:    Mammogram Screening: Recommended annual mammography  Pertinent mammograms are reviewed under the imaging tab.    Pertinent mammograms are reviewed under the imaging tab.  History of abnormal Pap smear: NO - age  30-65 PAP every 5 years with negative HPV co-testing recommended  PAP / HPV Latest Ref Rng & Units 2016   PAP - NIL NIL   HPV 16 DNA NEG Negative -   HPV 18 DNA NEG Negative -   OTHER HR HPV NEG Negative -     Reviewed and updated as needed this visit by clinical staff   Allergies  Meds              Reviewed and updated as needed this visit by Provider                Past Medical History:   Diagnosis Date     Anemia     PREGNANCY     Herpes simplex without mention of complication      HSV      Hypertension     Gestational     PONV (postoperative nausea and vomiting)     POST C SECTION     Pre-eclampsia       Past Surgical History:   Procedure Laterality Date     C BREAST PROSTHESIS, NOS  2004      SECTION       HERNIORRHAPHY VENTRAL  10/26/2012    Procedure: HERNIORRHAPHY VENTRAL;  Open Ventral Hernia Repair  ;  Surgeon: Shaun Clinton MD;  Location: [facility]     LAPAROSCOPIC CHOLECYSTECTOMY N/A 2018    Procedure: LAPAROSCOPIC CHOLECYSTECTOMY;  LAPAROSCOPIC CHOLECYSTECTOMY .;  Surgeon: Brandon Reagan MD;  Location: [facility]     LAPAROSCOPY DIAGNOSTIC (GYN) Bilateral 2016    Procedure: LAPAROSCOPY DIAGNOSTIC (GYN);  Surgeon: Ventura Flower MD;  [facility]     LAPAROTOMY MINI, TUBAL LIGATION (POST PARTUM), COMBINED  2012    Procedure:COMBINED LAPAROTOMY MINI, TUBAL LIGATION (POST PARTUM); Surgeon:RAVEN YARBROUGH; [facility]     TUBOPLASTY REANASTOMOSIS Bilateral 2016    Procedure: TUBOPLASTY REANASTOMOSIS;  Surgeon: Ventura Flower MD;  Location: [facility]       ROS:  CONSTITUTIONAL: NEGATIVE for fever, chills, change in weight  INTEGUMENTARY/SKIN: NEGATIVE for worrisome rashes, moles or lesions  EYES: NEGATIVE for vision changes or irritation  ENT: NEGATIVE for ear, mouth and throat problems  RESP: NEGATIVE for significant cough or SOB  BREAST: NEGATIVE for masses, tenderness or discharge  CV: NEGATIVE for chest pain, palpitations or peripheral  "edema  GI: NEGATIVE for nausea, abdominal pain, heartburn, or change in bowel habits  : NEGATIVE for unusual urinary or vaginal symptoms. No vaginal bleeding.  MUSCULOSKELETAL: NEGATIVE for significant arthralgias or myalgia  NEURO: NEGATIVE for weakness, dizziness or paresthesias  PSYCHIATRIC: NEGATIVE for changes in mood or affect     OBJECTIVE:   /79   Pulse 80   Ht 1.67 m (5' 5.75\")   Wt 74.8 kg (165 lb)   LMP  (LMP Unknown)   Breastfeeding No   BMI 26.83 kg/m    EXAM:  GENERAL: healthy, alert and no distress  EYES: Eyes grossly normal to inspection, PERRL and conjunctivae and sclerae normal  HENT: normal cephalic/atraumatic  NECK: no adenopathy, no asymmetry, masses, or scars and thyroid normal to palpation  RESP: lungs clear to auscultation - no rales, rhonchi or wheezes  CV: regular rate and rhythm, normal S1 S2, no S3 or S4, no murmur, click or rub, no peripheral edema and peripheral pulses strong  ABDOMEN: soft, nontender, no hepatosplenomegaly, no masses and bowel sounds normal  MS: no gross musculoskeletal defects noted, no edema  SKIN: no suspicious lesions or rashes  NEURO: Normal strength and tone, mentation intact and speech normal  PSYCH: mentation appears normal, affect normal/bright    Diagnostic Test Results:  Labs reviewed in Epic    ASSESSMENT/PLAN:   1. Preventative health care  Discussed colorectal screening with patient. Recommend colonoscopy. Also recommend lipid screening as well as screening for diabetes. Patient is up-to-date on Pap smear.  - GASTROENTEROLOGY ADULT REF PROCEDURE ONLY; Future  - CBC with platelets; Future  - Ferritin; Future  - Lipid Profile; Future  - Basic metabolic panel; Future  - Hepatic Panel; Future    2. Dense breast tissue on mammogram  Discussed options for breast cancer screening with patient. Recommend mammography.  - MA Screen Bilateral w/Anand; Future    3. Routine general medical examination at a health care facility  Reviewed vaccinations. " "Recommend Tdap. Given recent vaccination with COVID-19 vaccine, recommend postponing further vaccinations until 2 weeks after the booster shot.      Patient has been advised of split billing requirements and indicates understanding: Yes  COUNSELING:   Reviewed preventive health counseling, as reflected in patient instructions       Regular exercise       Healthy diet/nutrition       Vision screening    Estimated body mass index is 26.83 kg/m  as calculated from the following:    Height as of this encounter: 1.67 m (5' 5.75\").    Weight as of this encounter: 74.8 kg (165 lb).        She reports that she quit smoking about 21 years ago. Her smoking use included cigarettes. She has a 15.00 pack-year smoking history. She has never used smokeless tobacco.      Counseling Resources:  ATP IV Guidelines  Pooled Cohorts Equation Calculator  Breast Cancer Risk Calculator  BRCA-Related Cancer Risk Assessment: FHS-7 Tool  FRAX Risk Assessment  ICSI Preventive Guidelines  Dietary Guidelines for Americans, 2010  USDA's MyPlate  ASA Prophylaxis  Lung CA Screening    Blanca Heard MD  [facility]  "

## 2021-02-05 NOTE — TELEPHONE ENCOUNTER
Patient is scheduled for COLON with Dr. HELMS    Spoke with: SCOOTER    Date of Procedure: 2/22/2021    Location: [facility]    Sedation Type MAC    Pre-op for Unit J MAC N    (if yes advise patient they will need a pre-op prior to procedure)      Is patient on blood thinners? -N (If yes- inform patient to follow up with PCP or provider for follow up instructions)     Informed patient they will need an adult  Y    Informed Patient of COVID Test Requirement Y-SCHED    Preferred Pharmacy for Pre Prescription N/A    Confirmed Nurse will call to complete assessment Y    Additional comments: N/A

## 2021-02-06 ASSESSMENT — ANXIETY QUESTIONNAIRES: GAD7 TOTAL SCORE: 8

## 2021-02-07 DIAGNOSIS — Z11.59 ENCOUNTER FOR SCREENING FOR OTHER VIRAL DISEASES: ICD-10-CM

## 2021-02-09 ENCOUNTER — ANCILLARY PROCEDURE (OUTPATIENT)
Dept: ULTRASOUND IMAGING | Facility: CLINIC | Age: 51
End: 2021-02-09
Attending: OBSTETRICS & GYNECOLOGY
Payer: COMMERCIAL

## 2021-02-09 ENCOUNTER — OFFICE VISIT (OUTPATIENT)
Dept: OBGYN | Facility: CLINIC | Age: 51
End: 2021-02-09
Attending: OBSTETRICS & GYNECOLOGY
Payer: COMMERCIAL

## 2021-02-09 ENCOUNTER — ANCILLARY PROCEDURE (OUTPATIENT)
Dept: MAMMOGRAPHY | Facility: CLINIC | Age: 51
End: 2021-02-09
Attending: INTERNAL MEDICINE
Payer: COMMERCIAL

## 2021-02-09 VITALS
BODY MASS INDEX: 26.74 KG/M2 | SYSTOLIC BLOOD PRESSURE: 141 MMHG | WEIGHT: 166.4 LBS | HEIGHT: 66 IN | HEART RATE: 74 BPM | DIASTOLIC BLOOD PRESSURE: 83 MMHG

## 2021-02-09 DIAGNOSIS — Z12.4 CERVICAL CANCER SCREENING: ICD-10-CM

## 2021-02-09 DIAGNOSIS — Z30.011 ENCOUNTER FOR INITIAL PRESCRIPTION OF CONTRACEPTIVE PILLS: ICD-10-CM

## 2021-02-09 DIAGNOSIS — D25.9 UTERINE LEIOMYOMA, UNSPECIFIED LOCATION: Primary | ICD-10-CM

## 2021-02-09 DIAGNOSIS — R92.30 DENSE BREAST TISSUE ON MAMMOGRAM: ICD-10-CM

## 2021-02-09 PROCEDURE — 99213 OFFICE O/P EST LOW 20 MIN: CPT | Mod: GE | Performed by: OBSTETRICS & GYNECOLOGY

## 2021-02-09 PROCEDURE — G0145 SCR C/V CYTO,THINLAYER,RESCR: HCPCS | Performed by: OBSTETRICS & GYNECOLOGY

## 2021-02-09 PROCEDURE — 77067 SCR MAMMO BI INCL CAD: CPT | Performed by: RADIOLOGY

## 2021-02-09 PROCEDURE — 76830 TRANSVAGINAL US NON-OB: CPT | Mod: 26 | Performed by: OBSTETRICS & GYNECOLOGY

## 2021-02-09 PROCEDURE — 76830 TRANSVAGINAL US NON-OB: CPT

## 2021-02-09 PROCEDURE — 87624 HPV HI-RISK TYP POOLED RSLT: CPT | Performed by: OBSTETRICS & GYNECOLOGY

## 2021-02-09 PROCEDURE — G0463 HOSPITAL OUTPT CLINIC VISIT: HCPCS

## 2021-02-09 PROCEDURE — 77063 BREAST TOMOSYNTHESIS BI: CPT | Performed by: RADIOLOGY

## 2021-02-09 RX ORDER — ACETAMINOPHEN AND CODEINE PHOSPHATE 120; 12 MG/5ML; MG/5ML
0.35 SOLUTION ORAL DAILY
Qty: 90 TABLET | Refills: 3 | Status: SHIPPED | OUTPATIENT
Start: 2021-02-09 | End: 2021-02-18

## 2021-02-09 ASSESSMENT — MIFFLIN-ST. JEOR: SCORE: 1401.54

## 2021-02-09 ASSESSMENT — ANXIETY QUESTIONNAIRES
7. FEELING AFRAID AS IF SOMETHING AWFUL MIGHT HAPPEN: NOT AT ALL
3. WORRYING TOO MUCH ABOUT DIFFERENT THINGS: NOT AT ALL
GAD7 TOTAL SCORE: 0
5. BEING SO RESTLESS THAT IT IS HARD TO SIT STILL: NOT AT ALL
6. BECOMING EASILY ANNOYED OR IRRITABLE: NOT AT ALL
2. NOT BEING ABLE TO STOP OR CONTROL WORRYING: NOT AT ALL
1. FEELING NERVOUS, ANXIOUS, OR ON EDGE: NOT AT ALL

## 2021-02-09 ASSESSMENT — PATIENT HEALTH QUESTIONNAIRE - PHQ9: 5. POOR APPETITE OR OVEREATING: NOT AT ALL

## 2021-02-09 NOTE — PROGRESS NOTES
[facility]  Gynecology Visit    CC: follow up ultrasound      HPI:    Edwina Wynne is a 50 year old , here for follow up after repeat pelvic ultrasound. Patient says that she overall has been doing well. She  Has no specific concerns today. She is starting Accutane soon and needs second form of contraception (condoms as other form). She still gets periods, though they have become irregular, last one was 4 months ago. She denies any perimenopausal symptoms. She has no other concerns today.      Obstetrics History:  OB History    Para Term  AB Living   4 3 3 0 1 3   SAB TAB Ectopic Multiple Live Births   0 0 0 0 1      # Outcome Date GA Lbr Laurent/2nd Weight Sex Delivery Anes PTL Lv   4 Term 12 39w0d 03:10 / 00:24 3.204 kg (7 lb 1 oz) F  EPI N KARINA      Name: MARYBETH WYNNE      Apgar1: 9  Apgar5: 9   3 Term 09/15/10 37w0d  3.459 kg (7 lb 10 oz) F CS-LTranv Spinal        Birth Comments: breech, complicated gestional hypertension      Name: Dara   2 AB 2007              Birth Comments: no complications   1 Term 94 40w4d 04:00 3.459 kg (7 lb 10 oz) F  EPI        Birth Comments: delivery c/b fever     Gynecologic History:  - LMP: > 4 months ago  - Last Pap: NILM, HPV: negative (2016)  - Denies any history of abnormal pap smears  - Denies prior cervical surgery or procedures  - Contraception:  None  - Sexual Activity: male partner     Past Medical History:  Past Medical History:   Diagnosis Date     Anemia     PREGNANCY     Herpes simplex without mention of complication      HSV      Hypertension     Gestational     PONV (postoperative nausea and vomiting)     POST C SECTION     Pre-eclampsia        Past Surgical History:  Past Surgical History:   Procedure Laterality Date     C BREAST PROSTHESIS, NOS  2004      SECTION       HERNIORRHAPHY VENTRAL  10/26/2012    Procedure: HERNIORRHAPHY VENTRAL;  Open Ventral Hernia Repair  ;  Surgeon: Oz  Shaun Marques MD;  Location: [facility]     LAPAROSCOPIC CHOLECYSTECTOMY N/A 5/22/2018    Procedure: LAPAROSCOPIC CHOLECYSTECTOMY;  LAPAROSCOPIC CHOLECYSTECTOMY .;  Surgeon: Brandon Reagan MD;  Location: [facility]     LAPAROSCOPY DIAGNOSTIC (GYN) Bilateral 9/27/2016    Procedure: LAPAROSCOPY DIAGNOSTIC (GYN);  Surgeon: Ventura Flower MD;  Location: [facility]     LAPAROTOMY MINI, TUBAL LIGATION (POST PARTUM), COMBINED  5/24/2012    Procedure:COMBINED LAPAROTOMY MINI, TUBAL LIGATION (POST PARTUM); Surgeon:RAVEN YARBROUGH; [facility]     TUBOPLASTY REANASTOMOSIS Bilateral 9/27/2016    Procedure: TUBOPLASTY REANASTOMOSIS;  Surgeon: Ventura Flower MD;  Location: [facility]       Current Medications:  Prior to Admission medications    Medication Sig Last Dose Taking? Auth Provider   Calcium Carbonate-Vitamin D (CALCIUM + D PO) Take 1 tablet by mouth daily.   Reported, Patient   cetirizine (ZYRTEC) 10 MG tablet Take 10 mg by mouth daily   Reported, Patient   clobetasol (TEMOVATE) 0.05 % external solution    Reported, Patient   doxycycline monohydrate (MONODOX) 100 MG capsule Take 1 capsule (100 mg) by mouth 2 times daily   Chhaya Estes PA-C   IBUPROFEN PO Take 600 mg by mouth every 4 hours as needed for moderate pain   Reported, Patient   ketoconazole (NIZORAL) 2 % shampoo    Reported, Patient   magnesium 250 MG tablet Take 1 tablet by mouth daily   Reported, Patient   multivitamin w/minerals (THERA-VIT-M) tablet Take 1 tablet by mouth daily   Reported, Patient   tretinoin (RETIN-A) 0.05 % external cream Apply topically At Bedtime   Chhaya Estes PA-C       Allergies:  Patient has no known allergies.    Social History:   Social History     Socioeconomic History     Marital status:      Spouse name: Not on file     Number of children: Not on file     Years of education: Not on file     Highest education level: Not on file   Occupational History     Not on file   Social Needs     Financial resource  strain: Not on file     Food insecurity     Worry: Not on file     Inability: Not on file     Transportation needs     Medical: Not on file     Non-medical: Not on file   Tobacco Use     Smoking status: Former Smoker     Packs/day: 1.00     Years: 15.00     Pack years: 15.00     Types: Cigarettes     Quit date: 2000     Years since quittin.1     Smokeless tobacco: Never Used   Substance and Sexual Activity     Alcohol use: No     Alcohol/week: 0.0 standard drinks     Comment: occas     Drug use: No     Sexual activity: Yes     Partners: Male     Birth control/protection: Female Surgical     Comment: tubal ligation   Lifestyle     Physical activity     Days per week: Not on file     Minutes per session: Not on file     Stress: Not on file   Relationships     Social connections     Talks on phone: Not on file     Gets together: Not on file     Attends Denominational service: Not on file     Active member of club or organization: Not on file     Attends meetings of clubs or organizations: Not on file     Relationship status: Not on file     Intimate partner violence     Fear of current or ex partner: Not on file     Emotionally abused: Not on file     Physically abused: Not on file     Forced sexual activity: Not on file   Other Topics Concern     Parent/sibling w/ CABG, MI or angioplasty before 65F 55M? Not Asked   Social History Narrative     Not on file     Family History:  Sister has leiomyosarcoma, doing well currently on chemo break   Denies any family history of bleeding or clotting disorders   Family History   Problem Relation Age of Onset     Hypertension Father      Hypertension Brother      Cerebrovascular Disease Maternal Grandmother 50     Macular Degeneration Paternal Grandmother      Eye Disorder Other         Great aunt, retinitis pigmentosa     Asthma No family hx of      C.A.D. No family hx of      Diabetes No family hx of      Breast Cancer No family hx of      Cancer - colorectal No family hx of  "     Prostate Cancer No family hx of      Glaucoma No family hx of      ROS:  10-point ROS negative except as in HPI     Physical Exam  BP (!) 141/83 (BP Location: Left arm, Patient Position: Chair)   Pulse 74   Ht 1.676 m (5' 6\")   Wt 75.5 kg (166 lb 6.4 oz)   LMP  (LMP Unknown)   BMI 26.86 kg/m    Gen: Well-appearing, NAD  HEENT: Normocephalic, atraumatic  CV:  Regular rate  Pulm: Non labored respirations  Abd: Soft, non-tender, non-distended  Ext: No LE edema, extremities warm and well perfused    Pelvic Exam:  EG/BUS: Normal genital architecture without lesions, erythema or abnormal secretions Bartholin's, Urethra, Gilt Edge's normal   Urethral meatus: normal   Urethra: no masses, tenderness, or scarring   Bladder: no masses or tenderness   Vagina: moist, pink, rugae with creamy, white and odorless  secretions  Cervix: no lesions  Uterus: anteverted,  and small, smooth, firm, mobile w/o pain  Adnexa: Within normal limits and No masses, nodularity, tenderness  Rectum:anus normal     I have reviewed labs and imaging    Imaging:   Pelvic US: Uterine findings:    Presence: Visible Size: Normal 5.4 x 6.0 x 4.7 cm.  Endometrium = 2.7 mm.    Cx length = 26.3 mm.     Flexion:  Anteverted Position: Midline Margins: Smooth Shape: Normal    Contour: Regular Texture: Homogeneous Cavity: Normal Masses: Abnormal anterior mid myoma- 2.5 x 2.4 x 2.1cm.   Pelvic findings:    Right Adnexa: Normal    Left Adnexa: Normal    Bladder:  Normal          Cul - de - sac fluid: None   Ovarian follicles:    Right ovary:  1.5 x 1.5 x 1.1cm.    0 follicles    Left ovary:  1.6 x 1.7 x 1.2cm.     Assessment/Plan  Edwina Casiano Chikidanica Wynne is a 50 year old  female here for follow up ultrasound.    # History of AUB  # Uterine fibroid  -  Endometrial biopsy performed that was negative for malignancy or hyperplasia (10/2/19) at that time she had ultrasound that showed myoma with ill defined borders, recommended repeat ultrasound in 3 " months. Patient has sister with diagnosis of leiomyosarcoma, previously patient was very anxious about having a fibroid but says that anxiety has now improved. On ultrasound today fibroid has decreased in size and overall is stable. Discussed again with patient that without sending fibroid to pathology cannot rule out leiomyosarcoma, but on imaging there is nothing concerning. If she felt significant anxiety in setting of her sister's diagnosis could proceed with hysterectomy. Patient is overall reassured by stable ultrasound. Desires to continue expectant management.     # Contraception  - Starting Accutane, needs second form of contraception. Reviewed options with patient, she would like to start micronor. Discussed severe birth defects associated with Accutane and importance of compliance. 2    # Cervical cancer screening  - Pap smear obtained, okay with results via [employer]    Patient staffed with Dr. Dwaine Pinedo MD  OB/GYN Resident, PGY-4  2/9/2021, 12:06 PM  I agree with note as above. The patient was seen in continuity clinic by the resident doctor.  Assessment and plan were jointly made.  Laquita Isidro MD

## 2021-02-09 NOTE — LETTER
2021       RE: Edwina Wynne  [address]  [facility] [address]     Dear Colleague,    Thank you for referring your patient, Edwina Wynne, to the [facility] at [facility]. Please see a copy of my visit note below.    [facility]  Gynecology Visit    CC: follow up ultrasound      HPI:    Edwina Wynne is a 50 year old , here for follow up after repeat pelvic ultrasound. Patient says that she overall has been doing well. She  Has no specific concerns today. She is starting Accutane soon and needs second form of contraception (condoms as other form). She still gets periods, though they have become irregular, last one was 4 months ago. She denies any perimenopausal symptoms. She has no other concerns today.      Obstetrics History:  OB History    Para Term  AB Living   4 3 3 0 1 3   SAB TAB Ectopic Multiple Live Births   0 0 0 0 1      # Outcome Date GA Lbr Laurent/2nd Weight Sex Delivery Anes PTL Lv   4 Term 12 39w0d 03:10 / 00:24 3.204 kg (7 lb 1 oz) F  EPI N KARINA      Name: MARYBETH WYNNE      Apgar1: 9  Apgar5: 9   3 Term 09/15/10 37w0d  3.459 kg (7 lb 10 oz) F CS-LTranv Spinal        Birth Comments: breech, complicated gestional hypertension      Name: Dara   2 AB 2007              Birth Comments: no complications   1 Term 94 40w4d 04:00 3.459 kg (7 lb 10 oz) F  EPI        Birth Comments: delivery c/b fever     Gynecologic History:  - LMP: > 4 months ago  - Last Pap: NILM, HPV: negative (2016)  - Denies any history of abnormal pap smears  - Denies prior cervical surgery or procedures  - Contraception:  None  - Sexual Activity: male partner     Past Medical History:  Past Medical History:   Diagnosis Date     Anemia     PREGNANCY     Herpes simplex without mention of complication      HSV      Hypertension     Gestational     PONV  (postoperative nausea and vomiting)     POST C SECTION     Pre-eclampsia        Past Surgical History:  Past Surgical History:   Procedure Laterality Date     C BREAST PROSTHESIS, NOS  2004      SECTION       HERNIORRHAPHY VENTRAL  10/26/2012    Procedure: HERNIORRHAPHY VENTRAL;  Open Ventral Hernia Repair  ;  Surgeon: Shaun Clinton MD;  Location: [facility] OR     LAPAROSCOPIC CHOLECYSTECTOMY N/A 2018    Procedure: LAPAROSCOPIC CHOLECYSTECTOMY;  LAPAROSCOPIC CHOLECYSTECTOMY .;  Surgeon: Brandon Reagan MD;  Location: [facility] OR     LAPAROSCOPY DIAGNOSTIC (GYN) Bilateral 2016    Procedure: LAPAROSCOPY DIAGNOSTIC (GYN);  Surgeon: Ventura Flower MD;  Location: [facility]     LAPAROTOMY MINI, TUBAL LIGATION (POST PARTUM), COMBINED  2012    Procedure:COMBINED LAPAROTOMY MINI, TUBAL LIGATION (POST PARTUM); Surgeon:RAVEN YARBROUGH; Location:[facility]     TUBOPLASTY REANASTOMOSIS Bilateral 2016    Procedure: TUBOPLASTY REANASTOMOSIS;  Surgeon: Ventura Flower MD;  Location: [facility]       Current Medications:  Prior to Admission medications    Medication Sig Last Dose Taking? Auth Provider   Calcium Carbonate-Vitamin D (CALCIUM + D PO) Take 1 tablet by mouth daily.   Reported, Patient   cetirizine (ZYRTEC) 10 MG tablet Take 10 mg by mouth daily   Reported, Patient   clobetasol (TEMOVATE) 0.05 % external solution    Reported, Patient   doxycycline monohydrate (MONODOX) 100 MG capsule Take 1 capsule (100 mg) by mouth 2 times daily   Chhaya Estes PA-C   IBUPROFEN PO Take 600 mg by mouth every 4 hours as needed for moderate pain   Reported, Patient   ketoconazole (NIZORAL) 2 % shampoo    Reported, Patient   magnesium 250 MG tablet Take 1 tablet by mouth daily   Reported, Patient   multivitamin w/minerals (THERA-VIT-M) tablet Take 1 tablet by mouth daily   Reported, Patient   tretinoin (RETIN-A) 0.05 % external cream Apply topically At Bedtime   Chhaya Estes PA-C        Allergies:  Patient has no known allergies.    Social History:   Social History     Socioeconomic History     Marital status:      Spouse name: Not on file     Number of children: Not on file     Years of education: Not on file     Highest education level: Not on file   Occupational History     Not on file   Social Needs     Financial resource strain: Not on file     Food insecurity     Worry: Not on file     Inability: Not on file     Transportation needs     Medical: Not on file     Non-medical: Not on file   Tobacco Use     Smoking status: Former Smoker     Packs/day: 1.00     Years: 15.00     Pack years: 15.00     Types: Cigarettes     Quit date: 2000     Years since quittin.1     Smokeless tobacco: Never Used   Substance and Sexual Activity     Alcohol use: No     Alcohol/week: 0.0 standard drinks     Comment: occas     Drug use: No     Sexual activity: Yes     Partners: Male     Birth control/protection: Female Surgical     Comment: tubal ligation   Lifestyle     Physical activity     Days per week: Not on file     Minutes per session: Not on file     Stress: Not on file   Relationships     Social connections     Talks on phone: Not on file     Gets together: Not on file     Attends Shinto service: Not on file     Active member of club or organization: Not on file     Attends meetings of clubs or organizations: Not on file     Relationship status: Not on file     Intimate partner violence     Fear of current or ex partner: Not on file     Emotionally abused: Not on file     Physically abused: Not on file     Forced sexual activity: Not on file   Other Topics Concern     Parent/sibling w/ CABG, MI or angioplasty before 65F 55M? Not Asked   Social History Narrative     Not on file     Family History:  Sister has leiomyosarcoma, doing well currently on chemo break   Denies any family history of bleeding or clotting disorders   Family History   Problem Relation Age of Onset      "Hypertension Father      Hypertension Brother      Cerebrovascular Disease Maternal Grandmother 50     Macular Degeneration Paternal Grandmother      Eye Disorder Other         Great aunt, retinitis pigmentosa     Asthma No family hx of      C.A.D. No family hx of      Diabetes No family hx of      Breast Cancer No family hx of      Cancer - colorectal No family hx of      Prostate Cancer No family hx of      Glaucoma No family hx of      ROS:  10-point ROS negative except as in HPI     Physical Exam  BP (!) 141/83 (BP Location: Left arm, Patient Position: Chair)   Pulse 74   Ht 1.676 m (5' 6\")   Wt 75.5 kg (166 lb 6.4 oz)   LMP  (LMP Unknown)   BMI 26.86 kg/m    Gen: Well-appearing, NAD  HEENT: Normocephalic, atraumatic  CV:  Regular rate  Pulm: Non labored respirations  Abd: Soft, non-tender, non-distended  Ext: No LE edema, extremities warm and well perfused    Pelvic Exam:  EG/BUS: Normal genital architecture without lesions, erythema or abnormal secretions Bartholin's, Urethra, Savanna's normal   Urethral meatus: normal   Urethra: no masses, tenderness, or scarring   Bladder: no masses or tenderness   Vagina: moist, pink, rugae with creamy, white and odorless  secretions  Cervix: no lesions  Uterus: anteverted,  and small, smooth, firm, mobile w/o pain  Adnexa: Within normal limits and No masses, nodularity, tenderness  Rectum:anus normal     I have reviewed labs and imaging    Imaging:   Pelvic US: Uterine findings:    Presence: Visible Size: Normal 5.4 x 6.0 x 4.7 cm.  Endometrium = 2.7 mm.    Cx length = 26.3 mm.     Flexion:  Anteverted Position: Midline Margins: Smooth Shape: Normal    Contour: Regular Texture: Homogeneous Cavity: Normal Masses: Abnormal anterior mid myoma- 2.5 x 2.4 x 2.1cm.   Pelvic findings:    Right Adnexa: Normal    Left Adnexa: Normal    Bladder:  Normal          Cul - de - sac fluid: None   Ovarian follicles:    Right ovary:  1.5 x 1.5 x 1.1cm.    0 follicles    Left ovary:  1.6 " x 1.7 x 1.2cm.     Assessment/Plan  Edwina Wynne is a 50 year old  female here for follow up ultrasound.    # History of AUB  # Uterine fibroid  -  Endometrial biopsy performed that was negative for malignancy or hyperplasia (10/2/19) at that time she had ultrasound that showed myoma with ill defined borders, recommended repeat ultrasound in 3 months. Patient has sister with diagnosis of leiomyosarcoma, previously patient was very anxious about having a fibroid but says that anxiety has now improved. On ultrasound today fibroid has decreased in size and overall is stable. Discussed again with patient that without sending fibroid to pathology cannot rule out leiomyosarcoma, but on imaging there is nothing concerning. If she felt significant anxiety in setting of her sister's diagnosis could proceed with hysterectomy. Patient is overall reassured by stable ultrasound. Desires to continue expectant management.     # Contraception  - Starting Accutane, needs second form of contraception. Reviewed options with patient, she would like to start micronor. Discussed severe birth defects associated with Accutane and importance of compliance. 2    # Cervical cancer screening  - Pap smear obtained, okay with results via [employer]    Patient staffed with Dr. Dwaine Pinedo MD  OB/GYN Resident, PGY-4  2021, 12:06 PM  I agree with note as above. The patient was seen in continuity clinic by the resident doctor.  Assessment and plan were jointly made.  Laquita Isidro MD

## 2021-02-10 ASSESSMENT — ANXIETY QUESTIONNAIRES: GAD7 TOTAL SCORE: 0

## 2021-02-12 ENCOUNTER — OFFICE VISIT (OUTPATIENT)
Dept: FAMILY MEDICINE | Facility: CLINIC | Age: 51
End: 2021-02-12
Attending: FAMILY MEDICINE
Payer: COMMERCIAL

## 2021-02-12 VITALS
DIASTOLIC BLOOD PRESSURE: 83 MMHG | HEIGHT: 66 IN | BODY MASS INDEX: 26.42 KG/M2 | HEART RATE: 85 BPM | WEIGHT: 164.4 LBS | SYSTOLIC BLOOD PRESSURE: 145 MMHG

## 2021-02-12 DIAGNOSIS — L70.0 ACNE VULGARIS: ICD-10-CM

## 2021-02-12 DIAGNOSIS — Z00.00 PREVENTATIVE HEALTH CARE: ICD-10-CM

## 2021-02-12 DIAGNOSIS — Z51.81 MEDICATION MONITORING ENCOUNTER: ICD-10-CM

## 2021-02-12 DIAGNOSIS — L72.0 EPIDERMOID CYST: ICD-10-CM

## 2021-02-12 DIAGNOSIS — L20.89 OTHER ATOPIC DERMATITIS: Primary | ICD-10-CM

## 2021-02-12 LAB
ALBUMIN SERPL-MCNC: 4.2 G/DL (ref 3.4–5)
ALP SERPL-CCNC: 83 U/L (ref 40–150)
ALT SERPL W P-5'-P-CCNC: 16 U/L (ref 0–50)
ANION GAP SERPL CALCULATED.3IONS-SCNC: 3 MMOL/L (ref 3–14)
AST SERPL W P-5'-P-CCNC: 17 U/L (ref 0–45)
BILIRUB DIRECT SERPL-MCNC: 0.1 MG/DL (ref 0–0.2)
BILIRUB SERPL-MCNC: 0.4 MG/DL (ref 0.2–1.3)
BUN SERPL-MCNC: 17 MG/DL (ref 7–30)
CALCIUM SERPL-MCNC: 9.3 MG/DL (ref 8.5–10.1)
CHLORIDE SERPL-SCNC: 106 MMOL/L (ref 94–109)
CHOLEST SERPL-MCNC: 190 MG/DL
CO2 SERPL-SCNC: 30 MMOL/L (ref 20–32)
COPATH REPORT: NORMAL
CREAT SERPL-MCNC: 0.59 MG/DL (ref 0.52–1.04)
ERYTHROCYTE [DISTWIDTH] IN BLOOD BY AUTOMATED COUNT: 11.1 % (ref 10–15)
FERRITIN SERPL-MCNC: 29 NG/ML (ref 8–252)
GFR SERPL CREATININE-BSD FRML MDRD: >90 ML/MIN/{1.73_M2}
GLUCOSE SERPL-MCNC: 82 MG/DL (ref 70–99)
HCG UR QL: NEGATIVE
HCT VFR BLD AUTO: 41 % (ref 35–47)
HDLC SERPL-MCNC: 60 MG/DL
HGB BLD-MCNC: 14.8 G/DL (ref 11.7–15.7)
LDLC SERPL CALC-MCNC: 118 MG/DL
MCH RBC QN AUTO: 32 PG (ref 26.5–33)
MCHC RBC AUTO-ENTMCNC: 36.1 G/DL (ref 31.5–36.5)
MCV RBC AUTO: 89 FL (ref 78–100)
NONHDLC SERPL-MCNC: 130 MG/DL
PAP: NORMAL
PLATELET # BLD AUTO: 259 10E9/L (ref 150–450)
POTASSIUM SERPL-SCNC: 4.3 MMOL/L (ref 3.4–5.3)
PROT SERPL-MCNC: 7.5 G/DL (ref 6.8–8.8)
RBC # BLD AUTO: 4.63 10E12/L (ref 3.8–5.2)
SODIUM SERPL-SCNC: 139 MMOL/L (ref 133–144)
TRIGL SERPL-MCNC: 58 MG/DL
WBC # BLD AUTO: 5.1 10E9/L (ref 4–11)

## 2021-02-12 PROCEDURE — 80048 BASIC METABOLIC PNL TOTAL CA: CPT | Performed by: INTERNAL MEDICINE

## 2021-02-12 PROCEDURE — 82728 ASSAY OF FERRITIN: CPT | Performed by: INTERNAL MEDICINE

## 2021-02-12 PROCEDURE — 81025 URINE PREGNANCY TEST: CPT | Performed by: PHYSICIAN ASSISTANT

## 2021-02-12 PROCEDURE — G0463 HOSPITAL OUTPT CLINIC VISIT: HCPCS

## 2021-02-12 PROCEDURE — 80076 HEPATIC FUNCTION PANEL: CPT | Performed by: INTERNAL MEDICINE

## 2021-02-12 PROCEDURE — 99204 OFFICE O/P NEW MOD 45 MIN: CPT | Performed by: FAMILY MEDICINE

## 2021-02-12 PROCEDURE — 36415 COLL VENOUS BLD VENIPUNCTURE: CPT | Performed by: INTERNAL MEDICINE

## 2021-02-12 PROCEDURE — 80061 LIPID PANEL: CPT | Performed by: INTERNAL MEDICINE

## 2021-02-12 PROCEDURE — 85027 COMPLETE CBC AUTOMATED: CPT | Performed by: INTERNAL MEDICINE

## 2021-02-12 RX ORDER — CLOBETASOL PROPIONATE 0.5 MG/ML
SOLUTION TOPICAL
Qty: 50 ML | Refills: 0 | Status: SHIPPED | OUTPATIENT
Start: 2021-02-12 | End: 2022-05-11

## 2021-02-12 ASSESSMENT — MIFFLIN-ST. JEOR: SCORE: 1392.46

## 2021-02-12 NOTE — LETTER
"2/12/2021       RE: Edwina Wynne  [address]  [facility] [address]     Dear Colleague,    Thank you for referring your patient, Edwina Wynne, to the [facility] [place] at [facility]. Please see a copy of my visit note below.          Subjective   Edwina is a 50 year old who presents for the following health issues     HPI   1. Rash on hairline x years, comes and goes, possibly eczema  She saw dermatology was given Temovate to use   Last week, area was sore, noticed tender lump on right side which has decreased in size.   She has been scratching area. Lump had not been there before. Smaller lump on left side    2. Todd right upper arm x 1-2 week, little red papules ?blisters, which then crust over; very itchy. Not painful, not spreading, no changing in soaps, lotions or other topical contact. Using OTC hydrocortisone 2x/day.     Of note, recently saw Dr. Heard for annual, and needs to do labs from that visit today      Review of Systems   As noted above      Objective    BP (!) 145/83   Pulse 85   Ht 1.676 m (5' 6\")   Wt 74.6 kg (164 lb 6.4 oz)   LMP  (LMP Unknown)   BMI 26.53 kg/m    Body mass index is 26.53 kg/m .  Physical Exam   Alert, NAD  Scalp  L occiput to nape of neck: large lichenified patch with flaking, erythema; 5 mm nontender subcutaneous nodule, not inflamed,  R occipital scalp, similar to above, smaller, similar patch as left scalp, no lump or nodule palpated    Behind ears: erythema, mild flaking    R inner, upper arm: small cluster of crusted over papules with erythema, non tender     A/P  1. Atopic dermatitis  Discussed need to not scratch area  Apply Temovate solution 2x/day for 14 days  Recommend Neutrogena Tsal or Tgel shampoo  Can consider alternative diagnosis of psoriasis if not improving  Discussed initial lump in area likely infected follicle, resolved    2. Epidermoid " cyst--no intervention at this time, incidental finding  3. Rash, upper arm  Etiology unclear, possible zoster, although no pain  Recommend no scratching, no hydrocortisone and see if resolves. If not resolving over 1-2 weeks, if symptoms increase, return.    Follow-up as needed    Again, thank you for allowing me to participate in the care of your patient.      Sincerely,    Logan Turner MD

## 2021-02-12 NOTE — PROGRESS NOTES
"      Judy Bland is a 50 year old who presents for the following health issues     HPI   1. Rash on hairline x years, comes and goes, possibly eczema  She saw dermatology was given Temovate to use   Last week, area was sore, noticed tender lump on right side which has decreased in size.   She has been scratching area. Lump had not been there before. Smaller lump on left side    2. Todd right upper arm x 1-2 week, little red papules ?blisters, which then crust over; very itchy. Not painful, not spreading, no changing in soaps, lotions or other topical contact. Using OTC hydrocortisone 2x/day.     Of note, recently saw Dr. Heard for annual, and needs to do labs from that visit today      Review of Systems   As noted above      Objective    BP (!) 145/83   Pulse 85   Ht 1.676 m (5' 6\")   Wt 74.6 kg (164 lb 6.4 oz)   LMP  (LMP Unknown)   BMI 26.53 kg/m    Body mass index is 26.53 kg/m .  Physical Exam   Alert, NAD  Scalp  L occiput to nape of neck: large lichenified patch with flaking, erythema; 5 mm nontender subcutaneous nodule, not inflamed,  R occipital scalp, similar to above, smaller, similar patch as left scalp, no lump or nodule palpated    Behind ears: erythema, mild flaking    R inner, upper arm: small cluster of crusted over papules with erythema, non tender     A/P  1. Atopic dermatitis  Discussed need to not scratch area  Apply Temovate solution 2x/day for 14 days  Recommend Neutrogena Tsal or Tgel shampoo  Can consider alternative diagnosis of psoriasis if not improving  Discussed initial lump in area likely infected follicle, resolved    2. Epidermoid cyst--no intervention at this time, incidental finding  3. Rash, upper arm  Etiology unclear, possible zoster, although no pain  Recommend no scratching, no hydrocortisone and see if resolves. If not resolving over 1-2 weeks, if symptoms increase, return.    Follow-up as needed  "

## 2021-02-15 LAB
FINAL DIAGNOSIS: NORMAL
HPV HR 12 DNA CVX QL NAA+PROBE: NEGATIVE
HPV16 DNA SPEC QL NAA+PROBE: NEGATIVE
HPV18 DNA SPEC QL NAA+PROBE: NEGATIVE
SPECIMEN DESCRIPTION: NORMAL
SPECIMEN SOURCE CVX/VAG CYTO: NORMAL

## 2021-02-18 ENCOUNTER — VIRTUAL VISIT (OUTPATIENT)
Dept: DERMATOLOGY | Facility: CLINIC | Age: 51
End: 2021-02-18
Payer: COMMERCIAL

## 2021-02-18 DIAGNOSIS — Z79.899 ON ISOTRETINOIN THERAPY: Primary | ICD-10-CM

## 2021-02-18 PROCEDURE — 99213 OFFICE O/P EST LOW 20 MIN: CPT | Mod: GQ | Performed by: PHYSICIAN ASSISTANT

## 2021-02-18 RX ORDER — NORGESTIMATE AND ETHINYL ESTRADIOL 7DAYSX3 LO
1 KIT ORAL DAILY
Qty: 28 TABLET | Refills: 11 | Status: SHIPPED | OUTPATIENT
Start: 2021-02-18 | End: 2022-05-11

## 2021-02-18 ASSESSMENT — PAIN SCALES - GENERAL: PAINLEVEL: NO PAIN (0)

## 2021-02-18 NOTE — PROGRESS NOTES
[facility] Dermatology Note  Encounter Date: Feb 18, 2021  [employer] and Telephone ([phone]). Location of teledermatologist: [facility] [place].  Length of call: 11min.    Dermatology Problem List:  1. Hyperkeratosis of the elbows   2. Acne vulgaris  -Current tx: start isotretinoin 40 mg every day next month - 2/2020, doxycycline 100 mg BID, tretinoin 0.05% cream  -Previous tx: tretinoin 0.1% cream, clindamycin, BPO  3. Scalp psoriasis - clobetasol solution and T sal shampoo  ____________________________________________    Assessment & Plan:   1. Acne vulgaris - likely hormonal due to location of acne and patient's perimenopausal state- discussed that isotretinoin can help treat this kind of acne however you may have recurrent acne following discontinuation    Educated on the etiology    Discussed treatment options including topical medications, oral antibiotics, spironolactone and accutane    Discussed need for two forms of birth control while on accutane as patient is unwilling to be abstinent and although she feels she cannot get pregnant we still must follow the iPledge guidelines as she has not fully underwent menopasue at this time and has no other contraceptive methods. Discussed primary and secondary forms of contraception.     Contraception: OCPs and condoms.     Will start isotretinoin 40mg 1 month after patient's initial pregnancy test. Goal dose is 150-220mg/kg for this patient 72.3kg.    Discussion of the risks and side effects of isotretinoin including but not limited to mucocutaneous dryness, arthralgias, myalgias, depression, suicidal ideation, headache, blurred vision, increase in liver function test and increase in lipids. The iPledge program brochure was provided and the contents discussed with the patient. The patient was counseled that they cannot give blood while on isotretinoin. Advised against tattoos and waxing. No personal or  family history of inflammatory bowel disease or hypertriglyceridemia known to patient. Reviewed need to avoid alcohol on medication. The [employer] program consent was obtained. Patient counseled that if they wear contacts, the eyes may become too dry to tolerate. Recommend follow up with eye doctor if this occurs.      Discussed need for sun protection, at least SPF 30+.    Reviewed labs from 2/12/21 - CBC, lipids and CMP nml    Hcg negative from 2/12/21    Patient's iPledge # is [id number]. Consents signed.     The patient will stop all other acne medications prior to next visit.    Total dose: 0mg/kg    Procedures Performed:    None    Follow-up: 1 month(s) virtually (telephone with photos), or earlier for new or changing lesions    Staff:     All risks, benefits and alternatives were discussed with patient.  Patient is in agreement and understands the assessment and plan.  All questions were answered.    Chhaya Estes PA-C, MPAS  [facility] [place]: Phone: [phone], Fax: [phone]  [facility]: Phone: [phone],  Fax: [phone]  ____________________________________________    CC: Derm Problem (edwina is having this visit today for a follow up on acne and scalp flaking, states that her scalp is doing better with the medication given but her acne has been getting worse on her chin)    HPI:  Ms. Edwina Casiano [place]ne is a(n) 50 year old female who presents today as a return patient for acne. Doing well. Was planning on starting isotretinoin last year, but due to pandemic this was put on hold. She is ready to start this now. Additionally notes her scalp is getting better now that PCP prescribed clobetasol and recommended T sal shampoo. She has not had a period for 6mo. On OCPs. Plans on using condoms if sexually active. She is otherwise well, no other concerns.     Patient is otherwise feeling well, without additional  concerns.    Labs:  Reviewed 2/12/21    Physical Exam:  Vitals: LMP  (LMP Unknown)   SKIN: Teledermatology photos were reviewed; image quality and interpretability: acceptable. Image date: see upload date.  - scattered erythematous papules on the lower face  - weight: 159lbs  - No other lesions of concern on areas examined.     Medications:  Current Outpatient Medications   Medication     Calcium Carbonate-Vitamin D (CALCIUM + D PO)     cetirizine (ZYRTEC) 10 MG tablet     clobetasol (TEMOVATE) 0.05 % external solution     IBUPROFEN PO     magnesium 250 MG tablet     multivitamin w/minerals (THERA-VIT-M) tablet     norethindrone (MICRONOR) 0.35 MG tablet     tretinoin (RETIN-A) 0.05 % external cream     No current facility-administered medications for this visit.       Past Medical/Surgical History:   Patient Active Problem List   Diagnosis     Tubal ligation status     Ventral hernia, recurrent     Essential hypertension, benign     Tubal occlusion     Choledocholithiasis with acute cholecystitis     Past Medical History:   Diagnosis Date     Anemia     PREGNANCY     Herpes simplex without mention of complication      HSV      Hypertension     Gestational     PONV (postoperative nausea and vomiting)     POST C SECTION     Pre-eclampsia        CC Dr. Nguyen on close of this encounter

## 2021-02-18 NOTE — LETTER
2/18/2021       RE: Edwina Wynne  [address]  [facility] [address]     Dear Colleague,    Thank you for referring your patient, Edwina Wynne, to the [facility] [place] at [facility]. Please see a copy of my visit note below.    [facility] Dermatology Note  Encounter Date: Feb 18, 2021  Store-and-Forward and Telephone ([phone]). Location of teledermatologist: [facility] [place].  Length of call: 11min.    Dermatology Problem List:  1. Hyperkeratosis of the elbows   2. Acne vulgaris  -Current tx: start isotretinoin 40 mg every day next month - 2/2020, doxycycline 100 mg BID, tretinoin 0.05% cream  -Previous tx: tretinoin 0.1% cream, clindamycin, BPO  3. Scalp psoriasis - clobetasol solution and T sal shampoo  ____________________________________________    Assessment & Plan:   1. Acne vulgaris - likely hormonal due to location of acne and patient's perimenopausal state- discussed that isotretinoin can help treat this kind of acne however you may have recurrent acne following discontinuation    Educated on the etiology    Discussed treatment options including topical medications, oral antibiotics, spironolactone and accutane    Discussed need for two forms of birth control while on accutane as patient is unwilling to be abstinent and although she feels she cannot get pregnant we still must follow the iPledge guidelines as she has not fully underwent menopasue at this time and has no other contraceptive methods. Discussed primary and secondary forms of contraception.     Contraception: OCPs and condoms.     Will start isotretinoin 40mg 1 month after patient's initial pregnancy test. Goal dose is 150-220mg/kg for this patient 72.3kg.    Discussion of the risks and side effects of isotretinoin including but not limited to mucocutaneous dryness,  arthralgias, myalgias, depression, suicidal ideation, headache, blurred vision, increase in liver function test and increase in lipids. The iPledge program brochure was provided and the contents discussed with the patient. The patient was counseled that they cannot give blood while on isotretinoin. Advised against tattoos and waxing. No personal or family history of inflammatory bowel disease or hypertriglyceridemia known to patient. Reviewed need to avoid alcohol on medication. The [employer] program consent was obtained. Patient counseled that if they wear contacts, the eyes may become too dry to tolerate. Recommend follow up with eye doctor if this occurs.      Discussed need for sun protection, at least SPF 30+.    Reviewed labs from 2/12/21 - CBC, lipids and CMP nml    Hcg negative from 2/12/21    Patient's iPledge # is [id number]. Consents signed.     The patient will stop all other acne medications prior to next visit.    Total dose: 0mg/kg    Procedures Performed:    None    Follow-up: 1 month(s) virtually (telephone with photos), or earlier for new or changing lesions    Staff:     All risks, benefits and alternatives were discussed with patient.  Patient is in agreement and understands the assessment and plan.  All questions were answered.    Chhaya Estes PA-C, MPAS  [facility] [place]: Phone: [phone], Fax: [phone]  [facility]: Phone: [phone],  Fax: [phone]  ____________________________________________    CC: Derm Problem (edwina is having this visit today for a follow up on acne and scalp flaking, states that her scalp is doing better with the medication given but her acne has been getting worse on her chin)    HPI:  Ms. Edwina Casiano Aicharichi Wynne is a(n) 50 year old female who presents today as a return patient for acne. Doing well. Was planning on starting isotretinoin last year, but due to pandemic  this was put on hold. She is ready to start this now. Additionally notes her scalp is getting better now that PCP prescribed clobetasol and recommended T sal shampoo. She has not had a period for 6mo. On OCPs. Plans on using condoms if sexually active. She is otherwise well, no other concerns.     Patient is otherwise feeling well, without additional concerns.    Labs:  Reviewed 2/12/21    Physical Exam:  Vitals: LMP  (LMP Unknown)   SKIN: Teledermatology photos were reviewed; image quality and interpretability: acceptable. Image date: see upload date.  - scattered erythematous papules on the lower face  - weight: 159lbs  - No other lesions of concern on areas examined.     Medications:  Current Outpatient Medications   Medication     Calcium Carbonate-Vitamin D (CALCIUM + D PO)     cetirizine (ZYRTEC) 10 MG tablet     clobetasol (TEMOVATE) 0.05 % external solution     IBUPROFEN PO     magnesium 250 MG tablet     multivitamin w/minerals (THERA-VIT-M) tablet     norethindrone (MICRONOR) 0.35 MG tablet     tretinoin (RETIN-A) 0.05 % external cream     No current facility-administered medications for this visit.       Past Medical/Surgical History:   Patient Active Problem List   Diagnosis     Tubal ligation status     Ventral hernia, recurrent     Essential hypertension, benign     Tubal occlusion     Choledocholithiasis with acute cholecystitis     Past Medical History:   Diagnosis Date     Anemia     PREGNANCY     Herpes simplex without mention of complication      HSV      Hypertension     Gestational     PONV (postoperative nausea and vomiting)     POST C SECTION     Pre-eclampsia        CC Dr. Nguyen on close of this encounter

## 2021-02-18 NOTE — NURSING NOTE
Dermatology Rooming Note    Edwina Wynne's goals for this visit include:   Chief Complaint   Patient presents with     Derm Problem     edwina is having this visit today for a follow up on acne and scalp flaking, states that her scalp is doing better with the medication given but her acne has been getting worse on her chin     Maranda Walker CMA on 2/18/2021 at 2:53 PM

## 2021-02-22 ENCOUNTER — ANCILLARY PROCEDURE (OUTPATIENT)
Dept: MAMMOGRAPHY | Facility: CLINIC | Age: 51
End: 2021-02-22
Attending: INTERNAL MEDICINE
Payer: COMMERCIAL

## 2021-02-22 DIAGNOSIS — R92.8 ABNORMAL MAMMOGRAM OF RIGHT BREAST: ICD-10-CM

## 2021-02-22 PROCEDURE — G0279 TOMOSYNTHESIS, MAMMO: HCPCS | Performed by: RADIOLOGY

## 2021-02-22 PROCEDURE — 77065 DX MAMMO INCL CAD UNI: CPT | Performed by: RADIOLOGY

## 2021-02-22 PROCEDURE — 76642 ULTRASOUND BREAST LIMITED: CPT | Mod: RT | Performed by: RADIOLOGY

## 2021-03-19 ENCOUNTER — ALLIED HEALTH/NURSE VISIT (OUTPATIENT)
Dept: OBGYN | Facility: CLINIC | Age: 51
End: 2021-03-19
Payer: COMMERCIAL

## 2021-03-19 DIAGNOSIS — Z79.899 ON ISOTRETINOIN THERAPY: ICD-10-CM

## 2021-03-19 DIAGNOSIS — Z23 NEED FOR PROPHYLACTIC VACCINATION WITH DIPHTHERIA-TETANUS-PERTUSSIS WITH TYPHOID-PARATYPHOID (DTP + TAB) VACCINE: Primary | ICD-10-CM

## 2021-03-19 DIAGNOSIS — R12 HEARTBURN: Primary | ICD-10-CM

## 2021-03-19 LAB — HCG UR QL: NEGATIVE

## 2021-03-19 PROCEDURE — 90715 TDAP VACCINE 7 YRS/> IM: CPT

## 2021-03-19 PROCEDURE — 250N000011 HC RX IP 250 OP 636

## 2021-03-19 PROCEDURE — 81025 URINE PREGNANCY TEST: CPT | Performed by: PHYSICIAN ASSISTANT

## 2021-03-19 PROCEDURE — 90471 IMMUNIZATION ADMIN: CPT

## 2021-03-19 RX ORDER — FAMOTIDINE 20 MG/1
20 TABLET, FILM COATED ORAL 2 TIMES DAILY
Qty: 30 TABLET | Refills: 3 | Status: SHIPPED | OUTPATIENT
Start: 2021-03-19 | End: 2022-05-11

## 2021-03-22 ENCOUNTER — VIRTUAL VISIT (OUTPATIENT)
Dept: DERMATOLOGY | Facility: CLINIC | Age: 51
End: 2021-03-22
Payer: COMMERCIAL

## 2021-03-22 DIAGNOSIS — Z79.899 ON ISOTRETINOIN THERAPY: ICD-10-CM

## 2021-03-22 DIAGNOSIS — L70.0 ACNE VULGARIS: Primary | ICD-10-CM

## 2021-03-22 PROCEDURE — 99214 OFFICE O/P EST MOD 30 MIN: CPT | Mod: GQ | Performed by: PHYSICIAN ASSISTANT

## 2021-03-22 RX ORDER — ISOTRETINOIN 40 MG/1
40 CAPSULE ORAL DAILY
Qty: 30 CAPSULE | Refills: 0 | Status: SHIPPED | OUTPATIENT
Start: 2021-03-22 | End: 2022-09-16

## 2021-03-22 NOTE — NURSING NOTE
Dermatology Rooming Note    Edwina Wynne's goals for this visit include:   Chief Complaint   Patient presents with     Derm Problem     Acne - Accutane start     Jeimy Byrd, CMA

## 2021-03-22 NOTE — LETTER
3/22/2021       RE: Edwina Wynne  [address]  [facility] [address]     Dear Colleague,    Thank you for referring your patient, Edwina Wynne, to the [facility]ERMAT[facility] [place] at [facility]. Please see a copy of my visit note below.    [facility] Dermatology Note  Encounter Date: Mar 22, 2021  Store-and-Forward and Telephone ([phone]). Location of teledermatologist: [facility] [place].  Length of call: 8min.    Dermatology Problem List:  1. Hyperkeratosis of the elbows   2. Acne vulgaris  -Current tx: isotretinoin 40 mg daily initiated 3/22/21  -Previous tx: tretinoin 0.1% cream, clindamycin, BPO, doxycycline 100 mg BID, tretinoin 0.05% cream  3. Scalp psoriasis - clobetasol solution and T sal shampoo  ____________________________________________    Assessment & Plan:     1. Acne vulgaris - likely hormonal due to location of acne and patient's perimenopausal state- discussed that isotretinoin can help treat this kind of acne however you may have recurrent acne following discontinuation    Discussed need for two forms of birth control while on accutane as patient is unwilling to be abstinent and although she feels she cannot get pregnant we still must follow the iPledge guidelines as she has not fully underwent menopasue at this time and has no other contraceptive methods. Discussed primary and secondary forms of contraception.     Contraception: OCPs and condoms.     Will start isotretinoin 40mg this month pending negative pregnancy test. Goal dose is 150-220mg/kg for this patient 72.3kg.    Discussion of the risks and side effects of isotretinoin including but not limited to mucocutaneous dryness, arthralgias, myalgias, depression, suicidal ideation, headache, blurred vision, increase in liver function test and increase in lipids. The iPledge program  brochure was provided and the contents discussed with the patient. The patient was counseled that they cannot give blood while on isotretinoin. Advised against tattoos and waxing. No personal or family history of inflammatory bowel disease or hypertriglyceridemia known to patient. Reviewed need to avoid alcohol on medication. The iPledge program consent was obtained. Patient counseled that if they wear contacts, the eyes may become too dry to tolerate. Recommend follow up with eye doctor if this occurs.      Discussed need for sun protection, at least SPF 30+.    Reviewed labs from 2/12/21 - CBC, lipids and CMP nml    Hcg negative from 3/19/21    Patient's iPledge # is [id number]. Consents signed.     The patient will stop all other acne medications.    Total dose: 0mg/kg    Procedures Performed:    None    Follow-up: 1 month(s) in-person, or earlier for new or changing lesions    Staff:     All risks, benefits and alternatives were discussed with patient.  Patient is in agreement and understands the assessment and plan.  All questions were answered.    Chhaya Estes PA-C, MPAS  [facility] [place]: Phone: [phone], Fax: [phone]  [facility]: Phone: [phone],  Fax: [phone]  ____________________________________________    CC: Derm Problem (Acne - Accutane start)    HPI:  Ms. Edwina Casiano Aicharichi Wynne is a(n) 50 year old female who presents today as a return patient for Accutane start. She has not had a period for 6mo. On OCPs. Plans on using condoms if sexually active. She is otherwise well, no other concerns. Continues with acne breakouts.     Patient is otherwise feeling well, without additional skin concerns.    Labs Reviewed:  Hcg - negative as of 3/19/21    Physical Exam:  Vitals: There were no vitals taken for this visit.  SKIN: Teledermatology photos were reviewed; image quality and interpretability:  acceptable. Image date: 3/19/21.  - few erythematous papules on the chin  - No other lesions of concern on areas examined.     Medications:  Current Outpatient Medications   Medication     Calcium Carbonate-Vitamin D (CALCIUM + D PO)     cetirizine (ZYRTEC) 10 MG tablet     clobetasol (TEMOVATE) 0.05 % external solution     famotidine (PEPCID) 20 MG tablet     IBUPROFEN PO     magnesium 250 MG tablet     multivitamin w/minerals (THERA-VIT-M) tablet     norgestim-eth estrad triphasic (ORTHO TRI-CYCLEN LO) 0.18/0.215/0.25 MG-25 MCG tablet     tretinoin (RETIN-A) 0.05 % external cream     No current facility-administered medications for this visit.       Past Medical/Surgical History:   Patient Active Problem List   Diagnosis     Tubal ligation status     Ventral hernia, recurrent     Essential hypertension, benign     Tubal occlusion     Choledocholithiasis with acute cholecystitis     Past Medical History:   Diagnosis Date     Anemia     PREGNANCY     Herpes simplex without mention of complication      HSV      Hypertension     Gestational     PONV (postoperative nausea and vomiting)     POST C SECTION     Pre-eclampsia        CC Dr. Nguyen on close of this encounter.

## 2021-03-22 NOTE — PROGRESS NOTES
[facility] Dermatology Note  Encounter Date: Mar 22, 2021  Store-and-Forward and Telephone ([phone]). Location of teledermatologist: [facility] [place].  Length of call: 8min.    Dermatology Problem List:  1. Hyperkeratosis of the elbows   2. Acne vulgaris  -Current tx: isotretinoin 40 mg daily initiated 3/22/21  -Previous tx: tretinoin 0.1% cream, clindamycin, BPO, doxycycline 100 mg BID, tretinoin 0.05% cream  3. Scalp psoriasis - clobetasol solution and T sal shampoo  ____________________________________________    Assessment & Plan:     1. Acne vulgaris - likely hormonal due to location of acne and patient's perimenopausal state- discussed that isotretinoin can help treat this kind of acne however you may have recurrent acne following discontinuation    Discussed need for two forms of birth control while on accutane as patient is unwilling to be abstinent and although she feels she cannot get pregnant we still must follow the iPledge guidelines as she has not fully underwent menopasue at this time and has no other contraceptive methods. Discussed primary and secondary forms of contraception.     Contraception: OCPs and condoms.     Will start isotretinoin 40mg this month pending negative pregnancy test. Goal dose is 150-220mg/kg for this patient 72.3kg.    Discussion of the risks and side effects of isotretinoin including but not limited to mucocutaneous dryness, arthralgias, myalgias, depression, suicidal ideation, headache, blurred vision, increase in liver function test and increase in lipids. The iPledge program brochure was provided and the contents discussed with the patient. The patient was counseled that they cannot give blood while on isotretinoin. Advised against tattoos and waxing. No personal or family history of inflammatory bowel disease or hypertriglyceridemia known to patient. Reviewed need to avoid alcohol on medication. The iPledge program  consent was obtained. Patient counseled that if they wear contacts, the eyes may become too dry to tolerate. Recommend follow up with eye doctor if this occurs.      Discussed need for sun protection, at least SPF 30+.    Reviewed labs from 2/12/21 - CBC, lipids and CMP nml    Hcg negative from 3/19/21    Patient's iPledge # is [id number]. Consents signed.     The patient will stop all other acne medications.    Total dose: 0mg/kg    Procedures Performed:    None    Follow-up: 1 month(s) in-person, or earlier for new or changing lesions    Staff:     All risks, benefits and alternatives were discussed with patient.  Patient is in agreement and understands the assessment and plan.  All questions were answered.    Chhaya Estes PA-C, MPAS  [facility] [place]: Phone: [phone], Fax: [phone]  [facility]: Phone: [phone],  Fax: [phone]  ____________________________________________    CC: Derm Problem (Acne - Accutane start)    HPI:  Ms. Edwina Casiano Aicharichi Wynne is a(n) 50 year old female who presents today as a return patient for Accutane start. She has not had a period for 6mo. On OCPs. Plans on using condoms if sexually active. She is otherwise well, no other concerns. Continues with acne breakouts.     Patient is otherwise feeling well, without additional skin concerns.    Labs Reviewed:  Hcg - negative as of 3/19/21    Physical Exam:  Vitals: There were no vitals taken for this visit.  SKIN: Teledermatology photos were reviewed; image quality and interpretability: acceptable. Image date: 3/19/21.  - few erythematous papules on the chin  - No other lesions of concern on areas examined.     Medications:  Current Outpatient Medications   Medication     Calcium Carbonate-Vitamin D (CALCIUM + D PO)     cetirizine (ZYRTEC) 10 MG tablet     clobetasol (TEMOVATE) 0.05 % external solution     famotidine (PEPCID) 20 MG  tablet     IBUPROFEN PO     magnesium 250 MG tablet     multivitamin w/minerals (THERA-VIT-M) tablet     norgestim-eth estrad triphasic (ORTHO TRI-CYCLEN LO) 0.18/0.215/0.25 MG-25 MCG tablet     tretinoin (RETIN-A) 0.05 % external cream     No current facility-administered medications for this visit.       Past Medical/Surgical History:   Patient Active Problem List   Diagnosis     Tubal ligation status     Ventral hernia, recurrent     Essential hypertension, benign     Tubal occlusion     Choledocholithiasis with acute cholecystitis     Past Medical History:   Diagnosis Date     Anemia     PREGNANCY     Herpes simplex without mention of complication      HSV      Hypertension     Gestational     PONV (postoperative nausea and vomiting)     POST C SECTION     Pre-eclampsia        CC Dr. Nguyen on close of this encounter.

## 2021-05-25 ENCOUNTER — OFFICE VISIT (OUTPATIENT)
Dept: OPTOMETRY | Facility: CLINIC | Age: 51
End: 2021-05-25
Payer: COMMERCIAL

## 2021-05-25 ENCOUNTER — TELEPHONE (OUTPATIENT)
Dept: OPHTHALMOLOGY | Facility: CLINIC | Age: 51
End: 2021-05-25

## 2021-05-25 DIAGNOSIS — H52.03 HYPERMETROPIA OF BOTH EYES: Primary | ICD-10-CM

## 2021-05-27 NOTE — PROGRESS NOTES
No office visit. This encounter is for billing purposes only.   Contact Lens Billing  V-Code:  - Soft spherical, multifocal     # of units: 1 box right eye, 1 box left eye  Price per Unit: $50 right eye , $85 left eye   Shipping: $7.99  Total billed: $142.99    Confirm #: [id number]   Shipping directly to patient.      These are for cosmetic contact lenses.    Encounter Diagnosis   Name Primary?     Hypermetropia of both eyes Yes      Contact Lens Prescription (9/8/2020)      Brand Base Curve Diameter Sphere Add   Right B&L Ultra 8.5 14.2 +2.00    Left B&L Ultra for Presbyopia 8.5 14.2 +3.00 Hi   Expiration Date: 9/9/2022   Replacement: Monthly   Wearing Schedule: Daily wear       Date of last eye exam: 9/8/2020

## 2021-10-02 ENCOUNTER — HEALTH MAINTENANCE LETTER (OUTPATIENT)
Age: 51
End: 2021-10-02

## 2021-10-29 ENCOUNTER — OFFICE VISIT (OUTPATIENT)
Dept: OPHTHALMOLOGY | Facility: CLINIC | Age: 51
End: 2021-10-29
Payer: COMMERCIAL

## 2021-10-29 DIAGNOSIS — H52.03 HYPERMETROPIA OF BOTH EYES: Primary | ICD-10-CM

## 2021-10-29 PROCEDURE — 92015 DETERMINE REFRACTIVE STATE: CPT | Performed by: OPTOMETRIST

## 2021-10-29 PROCEDURE — 92012 INTRM OPH EXAM EST PATIENT: CPT | Performed by: OPTOMETRIST

## 2021-10-29 ASSESSMENT — REFRACTION_MANIFEST
OS_AXIS: 180
OS_ADD: +2.00
OD_AXIS: 167
OD_CYLINDER: +0.75
OS_SPHERE: +1.75
OD_SPHERE: +2.00
OS_CYLINDER: +0.75
OD_ADD: +2.00

## 2021-10-29 ASSESSMENT — EXTERNAL EXAM - LEFT EYE: OS_EXAM: NORMAL

## 2021-10-29 ASSESSMENT — REFRACTION_CURRENTRX
OD_SPHERE: +2.50
OS_ADD: HI
OS_SPHERE: +3.25
OS_SPHERE: +3.00
OS_BASECURVE: 8.5
OD_SPHERE: +2.00
OS_DIAMETER: 14.2
OS_ADD: HI
OD_DIAMETER: 14.1
OS_DIAMETER: 14.2
OS_BASECURVE: 8.5
OD_DIAMETER: 14.2
OD_BASECURVE: 8.5
OS_BRAND: B&L ULTRA FOR PRESBYOPIA
OD_BRAND: B&L ULTRA
OD_BASECURVE: 8.5
OS_BRAND: AIR OPTIX MF
OD_BRAND: DT1

## 2021-10-29 ASSESSMENT — CONF VISUAL FIELD
OS_NORMAL: 1
METHOD: COUNTING FINGERS
OD_NORMAL: 1

## 2021-10-29 ASSESSMENT — REFRACTION_WEARINGRX
OD_AXIS: 166
OD_ADD: +1.50
OS_CYLINDER: +0.75
OS_ADD: +1.50
OS_SPHERE: +2.00
OD_SPHERE: +1.50
SPECS_TYPE: PAL
OS_AXIS: 173
OD_CYLINDER: +0.75

## 2021-10-29 ASSESSMENT — VISUAL ACUITY
OS_CC: 20/20
OD_CC+: -1
METHOD: SNELLEN - LINEAR
CORRECTION_TYPE: GLASSES
OD_CC: J2
OS_CC+: -1
OD_CC: 20/20
OS_CC: J2

## 2021-10-29 ASSESSMENT — TONOMETRY
IOP_METHOD: ICARE
OS_IOP_MMHG: 11
OD_IOP_MMHG: 12

## 2021-10-29 ASSESSMENT — SLIT LAMP EXAM - LIDS
COMMENTS: MGD
COMMENTS: MGD

## 2021-10-29 ASSESSMENT — CUP TO DISC RATIO
OD_RATIO: 0.25
OS_RATIO: 0.25

## 2021-10-29 ASSESSMENT — EXTERNAL EXAM - RIGHT EYE: OD_EXAM: NORMAL

## 2021-10-29 NOTE — PATIENT INSTRUCTIONS
Artificial tears: (Water-like consistency. Can be used during the daytime)  -Refresh Plus  -Refresh Optive  -Refresh Relieva  -Systane Ultra  -Systane Complete  -Systane Hydration  -Blink Tears  (Notes: Anything in a bottle has preservatives and can be used up to 4x/day. Preservative free vials can be used as much as necessary)      Warm compresses: Use 1-2x/day for 5-10 minutes over closed eyelids  -Katelyn mask  -Tranquileyes beaded mask  -Mibo heating pad  -Crugers REST & RELIEF Eye Mask (Hot or Cold)  -I-RELIEF Therapy Mask  -OcuTherm Essentials Kit    You can also use a warm wet washcloth - however this frequently loses heat quickly and can dry your skin out a bit so we recommend any of the above re-usable beaded/gel eyemasks      To purchase these products you can look over-the-counter at drugstores or purchase online at the following websites:  -www.[employer]/  -www.[employer]

## 2021-10-29 NOTE — NURSING NOTE
Chief Complaints and History of Present Illnesses   Patient presents with     COMPREHENSIVE EYE EXAM     Exam and possible new brand of contacts.     Chief Complaint(s) and History of Present Illness(es)     COMPREHENSIVE EYE EXAM     Laterality: both eyes    Associated symptoms: dryness and redness.  Negative for eye pain, tearing and discharge    Treatments tried: artificial tears    Pain scale: 0/10    Comments: Exam and possible new brand of contacts.              Comments     Could not tolerate contacts issued last year. Vision each eye has remained stable with the past year. Using older glasses and distance is OK but reading is not clear with them.   Wearing contacts would cause each eye to become extremely red 2 hours into use. Feel very dry and Both lower lids would become inflamed. This was an issues since beginning these contacts a year ago. Did try to rule out lotions, make up and contact lens solution all seem to go back to contacts. Patient question if she is allergic to contacts and if she could tolerate a different brand. Wakes with red each eye that clear as day progresses.   Uses visine red out PRN each eye.     Lawanda Flores, COT COT 9:49 AM October 29, 2021

## 2021-10-29 NOTE — PROGRESS NOTES
A/P  1.) Hyperopia/Presbyopia OU  -Slight increase in plus Rx, updated today  -Previously tried MF CL's, good vision in modified monovision left eye near but could not tolerate monthlies  -Trial daily disposable. Can compare DT1 vs AV 1 Day Moist. Distance right eye, MF left eye  -Warm compress and AT for comfort. Okay to use AT with lenses in.  -Undilated ocular health unremarkable OU    Order trials of both and mail to pt. Okay to order if working well. Can f/u prn with CL concerns    I have confirmed the patient's CC, HPI and reviewed Past Medical History, Past Surgical History, Social History, Family History, Problem List, Medication List and agree with Tech note.     Damari Dunn, OD FAAO FSLS

## 2021-11-02 ENCOUNTER — DOCUMENTATION ONLY (OUTPATIENT)
Dept: OPTOMETRY | Facility: CLINIC | Age: 51
End: 2021-11-02

## 2021-11-27 ENCOUNTER — OFFICE VISIT (OUTPATIENT)
Dept: FAMILY MEDICINE | Facility: CLINIC | Age: 51
End: 2021-11-27
Payer: COMMERCIAL

## 2021-11-27 VITALS
WEIGHT: 158 LBS | RESPIRATION RATE: 16 BRPM | OXYGEN SATURATION: 98 % | SYSTOLIC BLOOD PRESSURE: 131 MMHG | TEMPERATURE: 98.6 F | BODY MASS INDEX: 25.5 KG/M2 | HEART RATE: 84 BPM | DIASTOLIC BLOOD PRESSURE: 89 MMHG

## 2021-11-27 DIAGNOSIS — B02.9 VARICELLA-ZOSTER VIRUS INFECTION: Primary | ICD-10-CM

## 2021-11-27 PROCEDURE — 99213 OFFICE O/P EST LOW 20 MIN: CPT | Performed by: FAMILY MEDICINE

## 2021-11-27 RX ORDER — VALACYCLOVIR HYDROCHLORIDE 1 G/1
1000 TABLET, FILM COATED ORAL 3 TIMES DAILY
Qty: 21 TABLET | Refills: 0 | Status: SHIPPED | OUTPATIENT
Start: 2021-11-27 | End: 2023-03-21

## 2021-11-27 RX ORDER — PREDNISONE 20 MG/1
20 TABLET ORAL DAILY
Qty: 7 TABLET | Refills: 0 | Status: SHIPPED | OUTPATIENT
Start: 2021-11-27 | End: 2021-12-04

## 2021-11-27 ASSESSMENT — ENCOUNTER SYMPTOMS
CARDIOVASCULAR NEGATIVE: 1
FEVER: 0
CHILLS: 0
WEIGHT LOSS: 0
GASTROINTESTINAL NEGATIVE: 1
EYES NEGATIVE: 1

## 2021-11-27 NOTE — PROGRESS NOTES
This is an otherwise healthy 51-year-old female coming in today with right upper arm rash for 2 days.  Noticed a small blisterlike lesion near her bicep yesterday morning.  She attempted some hydrocortisone but noticed that the blisterlike area continue to expand and she developed some itching.  Today was able to cover the lesion with antibiotic ointment and a bandage.  She does  for living.  She has had chickenpox as a child.  Denies any burning or nerve pain symptoms.  No fever, malaise, signs /symptoms of infection.        Review of Systems   Constitutional: Negative for chills, fever and weight loss.   Eyes: Negative.    Cardiovascular: Negative.    Gastrointestinal: Negative.    Genitourinary: Negative.    Skin: Positive for itching and rash.         Physical Exam  Constitutional:       Appearance: Normal appearance.   HENT:      Head: Normocephalic and atraumatic.      Right Ear: Tympanic membrane normal.      Left Ear: Tympanic membrane normal.      Nose: No congestion or rhinorrhea.   Pulmonary:      Effort: Pulmonary effort is normal.      Breath sounds: Normal breath sounds.   Skin:            Comments: Well circumcised area of vesicular lesions all on an erythematous base.   Neurological:      Mental Status: She is alert.       (B02.9,  B01.9) Varicella-zoster virus infection  (primary encounter diagnosis)  Comment: Discussed at length the importance of keeping lesions covered until they are fully healed.  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  Plan: valACYclovir (VALTREX) 1000 mg tablet,         predniSONE (DELTASONE) 20 MG tablet        At this point I feel risk for postherpetic neuralgia is minimal however certainly can evolve over time.  She should take the steroid and antiviral for 7 days and if symptoms persist or change she should let us know.

## 2021-12-15 ENCOUNTER — TELEPHONE (OUTPATIENT)
Dept: OBGYN | Facility: CLINIC | Age: 51
End: 2021-12-15
Payer: COMMERCIAL

## 2021-12-15 DIAGNOSIS — N63.0 LUMP OR MASS IN BREAST: ICD-10-CM

## 2021-12-15 DIAGNOSIS — N63.10 LUMP OF RIGHT BREAST: Primary | ICD-10-CM

## 2021-12-15 NOTE — TELEPHONE ENCOUNTER
Pt presented to clinic for appointment for breast lump. Came one day early for scheduled appointment 12/16/21.     Pt states she can feel lumps in right breast that seem to getting larger.     Advised that diagnostic MA and US orders will be placed and will [facility]. Number given for patient to call [facility] to schedule. Appointment with CUONG Barclay on 12/16/21 cancelled.     Pt verbalized understanding and agreement.     Orders placed per protocol.

## 2021-12-23 ENCOUNTER — ANCILLARY PROCEDURE (OUTPATIENT)
Dept: MAMMOGRAPHY | Facility: CLINIC | Age: 51
End: 2021-12-23
Attending: OBSTETRICS & GYNECOLOGY
Payer: COMMERCIAL

## 2021-12-23 DIAGNOSIS — N63.0 LUMP OR MASS IN BREAST: ICD-10-CM

## 2021-12-23 PROCEDURE — 76642 ULTRASOUND BREAST LIMITED: CPT | Mod: RT

## 2021-12-23 PROCEDURE — G0279 TOMOSYNTHESIS, MAMMO: HCPCS

## 2022-03-13 ENCOUNTER — HEALTH MAINTENANCE LETTER (OUTPATIENT)
Age: 52
End: 2022-03-13

## 2022-03-17 ENCOUNTER — TELEPHONE (OUTPATIENT)
Dept: INTERNAL MEDICINE | Facility: CLINIC | Age: 52
End: 2022-03-17
Payer: COMMERCIAL

## 2022-03-17 NOTE — TELEPHONE ENCOUNTER
[facility] Call Center    Phone Message    May a detailed message be left on voicemail: yes     Reason for Call: Order(s): Other:   Reason for requested: Colonoscopy, patient had order in  that has .  Please re-instate order.  Date needed: ASAP  Provider name: Dr. Heard      Action Taken: Other: [facility]    Travel Screening: Not Applicable

## 2022-03-23 ENCOUNTER — TELEPHONE (OUTPATIENT)
Dept: GASTROENTEROLOGY | Facility: CLINIC | Age: 52
End: 2022-03-23
Payer: COMMERCIAL

## 2022-03-23 DIAGNOSIS — Z11.59 ENCOUNTER FOR SCREENING FOR OTHER VIRAL DISEASES: Primary | ICD-10-CM

## 2022-03-23 NOTE — TELEPHONE ENCOUNTER
Screening Questions    BlueKIND OF PREP RedLOCATION [review exclusion criteria] GreenSEDATION TYPE    1. Are you active on mychart? Y    2. What insurance is in the chart? PO     3.   Ordering/Referring Provider: Blanca Heard MD      4. BMI 25.0   [BMI OVER 40-EXTENDED PREP]  (If greater than 40 review exclusion criteria [PAC APPT IF @ [facility]] )    5. Have you had a positive covid test in the last 90 days? N     6. Respiratory Screening :  [If yes to any of the following HOSPITAL setting only]     Do you use daily home oxygen? N    Do you have mod to severe Obstructive Sleep Apnea? N    [OKAY @ [facility] SH PH RI] &  ok @ [facility] - if pt is not on OXYGEN]   Do you have Pulmonary Hypertension? N     Do you have UNCONTROLLED asthma? N      7.   Have you had a heart or lung transplant? N      8.   Have you had a stroke or Transient ischemic attack (TIA - aka  mini stroke ) within 6 months?  N (If yes, please review exclusion criteria)    9.   In the past 6 months, have you had any heart related issues including cardiomyopathy or heart attack? N           If yes, did it require cardiac stenting or other implantable device? N      10.   Do you have any implantable devices in your body (pacemaker, defib, LVAD)? N (If yes, please review exclusion criteria)    11. Do you take nitroglycerin? N           If yes, how often? N  (if yes, HOSPITAL setting ONLY)    12.  Are you currently taking any blood thinners?   N           [IF YES, INFORM PATIENT TO FOLLOW UP W/ ORDERING PROVIDER FOR BRIDGING INSTRUCTIONS]     13.  Are you currently on dialysis? N [ If yes, G-PREP & HOSPITAL setting ONLY]     14.  Do you have chronic kidney disease? N [ If yes, G-PREP ]    15.   Do you have a diagnosis of diabetes?   N   [ If yes, G-PREP ]    16.   [FEMALES] Are you currently pregnant?     If yes, how many weeks?     17.   Are you taking any prescription pain medications on a routine schedule?  N [If yes, MAC]  [ If yes, EXTENDED PREP]      18.   Do you have any chemical dependencies such as alcohol, street drugs, or methadone?  N [If yes, MAC]    19.   Do you have any history of post-traumatic stress syndrome, severe anxiety or history of psychosis?  N  [If yes, MAC]    20.   Do you have a significant intellectual disability?  N [If yes, MAC]    21.   Do you transfer independently?  Y   (If NO, please HOSPITAL setting ONLY)    22.  On a regular basis do you go 3-5 days between bowel movements?   N   [ If yes, EXTENDED PREP]    23.   Preferred LOCAL Pharmacy for Pre Prescription:         [id number] [place], [place] - [address]      Scheduling Details  (Please ask for phone number if not scheduled by patient)      Caller : Edwina Wynne    Date of Procedure: 04/20/2022  Surgeon: FABIOLA   Location: [facility]; [address] [place] [address] [address] [address]      Sedation Type: MAC  l PER LOC   Conscious Sedation- Needs  for 6 hours after the procedure  MAC/General-Needs  for 24 hours after procedure    N :Pre-op Required at [facility]  SH  [facility] and OR for MAC sedation  (advise patient they will need a pre-op WITH IN 30 DAYS of procedure date)     Type of Procedure Scheduled:   Lower Endoscopy [Colonoscopy]    Which Colonoscopy Prep was Sent?:    / PER PROTOCOL  EMANI CF PATIENTS & GROEN'S PATIENTS NEEDS EXTENDED PREP       Informed patient they will need an adult  Y  Cannot take any type of public or medical transportation alone    Pre-Procedure Covid test to be completed at [facility] or Externally: Y    Confirmed Nurse will call to complete assessment Y    Additional comments: N

## 2022-04-22 ENCOUNTER — TELEPHONE (OUTPATIENT)
Dept: GASTROENTEROLOGY | Facility: CLINIC | Age: 52
End: 2022-04-22
Payer: COMMERCIAL

## 2022-04-22 NOTE — TELEPHONE ENCOUNTER
Attempted to contact patient regarding upcoming colonoscopy procedure on 5.4.2022 for pre assessment questions. No answer.     Left message to return call to [phone] #2    Elena Sloan RN

## 2022-04-22 NOTE — TELEPHONE ENCOUNTER
Patient scheduled for colonoscopy on 5.4.2022.     Covid test scheduled: 5.2.2022    Arrival time: 0630    Facility location: [facility]    Sedation type: MAC    Indication for procedure: screening colonoscopy    Referring provider: Blanca Heard MD    Bowel prep recommendation: Miralax/Magnesium citrate/Dulcolax    Pre visit planning completed.    Elena Sloan RN

## 2022-04-29 NOTE — TELEPHONE ENCOUNTER
Pre assessment questions completed for upcoming colonoscopy procedure scheduled on 5.4.2022    COVID test scheduled 5.2.2022    Reviewed procedural arrival time 0630 and facility location [facility].    Designated  policy reviewed. Instructed to have someone stay 24 hours post procedure.     Anticoagulation/blood thinners? no    Electronic implanted devices? no    Reviewed Miralax/Magnesium citrate/Dulcolax prep instructions with patient. No fiber/iron supplements or foods that contain nuts/seeds prior to procedure.     Patient verbalized understanding and had no questions or concerns at this time.    Elena Sloan RN

## 2022-05-02 ENCOUNTER — LAB (OUTPATIENT)
Dept: LAB | Facility: CLINIC | Age: 52
End: 2022-05-02
Payer: COMMERCIAL

## 2022-05-02 DIAGNOSIS — Z11.59 ENCOUNTER FOR SCREENING FOR OTHER VIRAL DISEASES: ICD-10-CM

## 2022-05-02 PROCEDURE — U0003 INFECTIOUS AGENT DETECTION BY NUCLEIC ACID (DNA OR RNA); SEVERE ACUTE RESPIRATORY SYNDROME CORONAVIRUS 2 (SARS-COV-2) (CORONAVIRUS DISEASE [COVID-19]), AMPLIFIED PROBE TECHNIQUE, MAKING USE OF HIGH THROUGHPUT TECHNOLOGIES AS DESCRIBED BY CMS-2020-01-R: HCPCS

## 2022-05-02 PROCEDURE — U0005 INFEC AGEN DETEC AMPLI PROBE: HCPCS

## 2022-05-03 LAB — SARS-COV-2 RNA RESP QL NAA+PROBE: NEGATIVE

## 2022-05-04 ENCOUNTER — LAB REQUISITION (OUTPATIENT)
Dept: LAB | Facility: CLINIC | Age: 52
End: 2022-05-04
Payer: COMMERCIAL

## 2022-05-04 ENCOUNTER — DOCUMENTATION ONLY (OUTPATIENT)
Dept: GASTROENTEROLOGY | Facility: OUTPATIENT CENTER | Age: 52
End: 2022-05-04
Attending: INTERNAL MEDICINE
Payer: COMMERCIAL

## 2022-05-04 ENCOUNTER — TRANSFERRED RECORDS (OUTPATIENT)
Dept: HEALTH INFORMATION MANAGEMENT | Facility: CLINIC | Age: 52
End: 2022-05-04
Payer: COMMERCIAL

## 2022-05-04 DIAGNOSIS — Z00.00 PREVENTATIVE HEALTH CARE: ICD-10-CM

## 2022-05-04 PROCEDURE — 88305 TISSUE EXAM BY PATHOLOGIST: CPT | Mod: 26 | Performed by: PATHOLOGY

## 2022-05-04 PROCEDURE — 88305 TISSUE EXAM BY PATHOLOGIST: CPT | Mod: TC,ORL | Performed by: SURGERY

## 2022-05-05 LAB
PATH REPORT.COMMENTS IMP SPEC: NORMAL
PATH REPORT.COMMENTS IMP SPEC: NORMAL
PATH REPORT.FINAL DX SPEC: NORMAL
PATH REPORT.GROSS SPEC: NORMAL
PATH REPORT.MICROSCOPIC SPEC OTHER STN: NORMAL
PATH REPORT.RELEVANT HX SPEC: NORMAL
PHOTO IMAGE: NORMAL

## 2022-05-11 ENCOUNTER — OFFICE VISIT (OUTPATIENT)
Dept: OBGYN | Facility: CLINIC | Age: 52
End: 2022-05-11
Attending: OBSTETRICS & GYNECOLOGY
Payer: COMMERCIAL

## 2022-05-11 VITALS — WEIGHT: 169.1 LBS | BODY MASS INDEX: 27.18 KG/M2 | HEIGHT: 66 IN

## 2022-05-11 DIAGNOSIS — N95.1 MENOPAUSAL SYNDROME (HOT FLASHES): ICD-10-CM

## 2022-05-11 DIAGNOSIS — Z00.00 ANNUAL PHYSICAL EXAM: Primary | ICD-10-CM

## 2022-05-11 DIAGNOSIS — L20.89 OTHER ATOPIC DERMATITIS: ICD-10-CM

## 2022-05-11 PROCEDURE — 99212 OFFICE O/P EST SF 10 MIN: CPT | Mod: 25 | Performed by: OBSTETRICS & GYNECOLOGY

## 2022-05-11 PROCEDURE — G0463 HOSPITAL OUTPT CLINIC VISIT: HCPCS

## 2022-05-11 PROCEDURE — 99396 PREV VISIT EST AGE 40-64: CPT | Performed by: OBSTETRICS & GYNECOLOGY

## 2022-05-11 RX ORDER — PROGESTERONE 100 MG/1
100 CAPSULE ORAL DAILY
Qty: 90 CAPSULE | Refills: 4 | Status: SHIPPED | OUTPATIENT
Start: 2022-05-11 | End: 2022-12-09

## 2022-05-11 RX ORDER — ESTRADIOL 0.04 MG/D
1 PATCH TRANSDERMAL WEEKLY
Qty: 12 PATCH | Refills: 4 | Status: SHIPPED | OUTPATIENT
Start: 2022-05-11 | End: 2022-12-09

## 2022-05-11 RX ORDER — CLOBETASOL PROPIONATE 0.5 MG/ML
SOLUTION TOPICAL
Qty: 50 ML | Refills: 1 | Status: SHIPPED | OUTPATIENT
Start: 2022-05-11 | End: 2022-08-08

## 2022-05-11 NOTE — PROGRESS NOTES
"[facility]  Gynecology Visit    SUBJECTIVE    Edwina Wynne is a 51 year old postmenopausal  who is here for an annual examination.     Edwina last had a period over 1 year ago. She does note she's had hot flashes during the day, which she finds manageable. However, she has had difficulty sleeping over the last year because of night sweats. They are very bothersome.    She does perform self-breast exams, and noted a \"cyst\" in her right breast over the last several months. She has had a recent mammogram which was benign.     She requests a refill of temovate for her scalp.    PAST MEDICAL HISTORY  Past Medical History:   Diagnosis Date     Anemia     PREGNANCY     Herpes simplex without mention of complication      HSV      Hypertension     Gestational     PONV (postoperative nausea and vomiting)     POST C SECTION     Pre-eclampsia        MEDICATIONS  Current Outpatient Medications   Medication     Calcium Carbonate-Vitamin D (CALCIUM + D PO)     cetirizine (ZYRTEC) 10 MG tablet     clobetasol (TEMOVATE) 0.05 % external solution     famotidine (PEPCID) 20 MG tablet     IBUPROFEN PO     ISOtretinoin (ACCUTANE) 40 MG capsule     magnesium 250 MG tablet     multivitamin w/minerals (THERA-VIT-M) tablet     norgestim-eth estrad triphasic (ORTHO TRI-CYCLEN LO) 0.18/0.215/0.25 MG-25 MCG tablet     valACYclovir (VALTREX) 1000 mg tablet     No current facility-administered medications for this visit.       ALLERGIES  No Known Allergies    OBSTETRIC/GYNECOLOGIC HISTORY  Last period:     Lab Results   Component Value Date    PAP NIL 2021      History of abnormal Pap smear: No    OB History    Para Term  AB Living   4 3 3 0 1 3   SAB IAB Ectopic Multiple Live Births   0 0 0 0 1      # Outcome Date GA Lbr Laurent/2nd Weight Sex Delivery Anes PTL Lv   4 Term // 39w0d 03:10 / 00:24 3.204 kg (7 lb 1 oz) F  EPI N KARINA      Name: MARYBETH WYNNE      Apgar1: 9  " Apgar5: 9   3 Term 09/15/10 37w0d  3.459 kg (7 lb 10 oz) F CS-LTranv Spinal        Birth Comments: breech, complicated gestional hypertension      Name: Dara Sanon AB 2005              Birth Comments: no complications   1 Term 94 40w4d 04:00 3.459 kg (7 lb 10 oz) F  EPI        Birth Comments: delivery c/b fever       PAST SURGICAL HISTORY   Past Surgical History:   Procedure Laterality Date     C BREAST PROSTHESIS, NOS  2004      SECTION       HERNIORRHAPHY VENTRAL  10/26/2012    Procedure: HERNIORRHAPHY VENTRAL;  Open Ventral Hernia Repair  ;  Surgeon: Shaun Clinton MD;  Location: [facility]     LAPAROSCOPIC CHOLECYSTECTOMY N/A 2018    Procedure: LAPAROSCOPIC CHOLECYSTECTOMY;  LAPAROSCOPIC CHOLECYSTECTOMY .;  Surgeon: Brandon Reagan MD;  Location: [facility] OR     LAPAROSCOPY DIAGNOSTIC (GYN) Bilateral 2016    Procedure: LAPAROSCOPY DIAGNOSTIC (GYN);  Surgeon: Ventura Flower MD;  Location: [facility]     LAPAROTOMY MINI, TUBAL LIGATION (POST PARTUM), COMBINED  2012    Procedure:COMBINED LAPAROTOMY MINI, TUBAL LIGATION (POST PARTUM); Surgeon:RAVEN YARBROUGH; Location: [facility]     TUBOPLASTY REANASTOMOSIS Bilateral 2016    Procedure: TUBOPLASTY REANASTOMOSIS;  Surgeon: Ventura Flower MD;  Location: [facility]       SOCIAL HISTORY  Social History     Tobacco Use     Smoking status: Former Smoker     Packs/day: 1.00     Years: 15.00     Pack years: 15.00     Types: Cigarettes     Quit date: 2000     Years since quittin.3     Smokeless tobacco: Never Used   Substance Use Topics     Alcohol use: No     Alcohol/week: 0.0 standard drinks     Comment: occas     Drug use: No     Social History     Social History Narrative     Not on file       FAMILY HISTORY    Family History   Problem Relation Age of Onset     Hypertension Father      Uterine Cancer Sister         leiomyosarcoma     Hypertension Brother      Cerebrovascular Disease Maternal Grandmother 50     Macular  "Degeneration Paternal Grandmother      Eye Disorder Other         Great aunt, retinitis pigmentosa     Asthma No family hx of      C.A.D. No family hx of      Diabetes No family hx of      Breast Cancer No family hx of      Cancer - colorectal No family hx of      Prostate Cancer No family hx of      Glaucoma No family hx of        REVIEW OF SYSTEMS  A 10 point review of systems including Constitutional, Eyes, Respiratory, Cardiovascular, Gastroenterology, Genitourinary, Integumentary, Musculoskeletal, and Psychiatric, were all negative, except for pertinent positives noted in the above HPI.    OBJECTIVE  Ht 1.676 m (5' 6\")   Wt 76.7 kg (169 lb 1.6 oz)   BMI 27.29 kg/m      General: Alert, without distress  HEENT: normocephalic, without obvious abnormality   Breast: Within normal limits bilaterally, no nipple discharge, no lymphadenopathy; right breast with 2cm mobile nontender mass in RUO quadrant  Cardiovascular: regular rate and rhythm, no murmurs/rubs/gallops   Lungs: clear to auscultation bilaterally   Abdomen: soft, non-tender, non-distended, normal bowel sounds   Pelvic: deferred today   Extremities: normal    IMAGING:  Pelvic Ultrasound 21:  Abnormal anterior mid myoma- 2.5 x 2.4 x 2.1cm.   Comments:   single fibroid as described above otherwise normal pelvic ultrasound    Breast US and Mammography with Tomosynthesis 21:   IMPRESSION:   Benign cyst seen on ultrasound correlates with palpable lump and the mammographic abnormality. No findings to suggest malignancy.  ACR BI-RADS Category 2: Benign.    ASSESSMENT  Edwina Ho Chikidanica Wynne is a 51 year old  who is here for an annual examination    PLAN     Age 40-64 Annual Preventive Exam  1.  Screening   Cervical cancer: last pap 2021 NIL/HPV neg, due    Breast cancer: clinical breast exam today with noted breast cyst, Mammography up to date 2021, benign, repeat 2022   Colorectal cancer: completed per Edwina, vianey due in " 2027   Diabetes: HbA1c ordered today, if normal, due in 2025   HIV: screening ordered today. If negative, repeat if new exposure concerns.   Thyroid disease: screening ordered today, if normal, due in 5 years   Hepatitis C:  screening ordered today. If negative, repeat if new exposure concerns.    2.  Immunizations   Tdap q 10 years 2021, repeat 2031   S/p COVID x 3    3. Menopause:   -discussed lifestyle changes, non-hormonal treatments, and hormonal treatments. Edwina was most interested in hormone replacement. We discussed the following risks of estrogen/progesterone replacement, including: DVT, breast cancer, uterine cancer, heart disease, and stroke; and benefits, including decreased all cause mortality, osteoporosis, and improvement in hot flashes, night sweats, and sleep disturbance. Edwina is interested in starting hormone replacement. Discussed use of climara patch weekly 0.375mg and daily prometrium 100mg. Plan for symptom eval in 3 months to make any necessary dose adjustments.     RTC in one year for an annual examination.    Lizzeth Mejia MD, MSCI    [facility]  5/11/22

## 2022-05-11 NOTE — LETTER
"2022       RE: Edwina Wynne  [address]  [facility] [address]     Dear Colleague,    Thank you for referring your patient, Edwina Wynne, to the [facility] [place] [facility]. Please see a copy of my visit note below.    [facility]  Gynecology Visit    SUBJECTIVE    Edwina Wynne is a 51 year old postmenopausal  who is here for an annual examination.     Edwina last had a period over 1 year ago. She does note she's had hot flashes during the day, which she finds manageable. However, she has had difficulty sleeping over the last year because of night sweats. They are very bothersome.    She does perform self-breast exams, and noted a \"cyst\" in her right breast over the last several months. She has had a recent mammogram which was benign.     She requests a refill of temovate for her scalp.    PAST MEDICAL HISTORY  Past Medical History:   Diagnosis Date     Anemia     PREGNANCY     Herpes simplex without mention of complication      HSV      Hypertension     Gestational     PONV (postoperative nausea and vomiting)     POST C SECTION     Pre-eclampsia        MEDICATIONS  Current Outpatient Medications   Medication     Calcium Carbonate-Vitamin D (CALCIUM + D PO)     cetirizine (ZYRTEC) 10 MG tablet     clobetasol (TEMOVATE) 0.05 % external solution     famotidine (PEPCID) 20 MG tablet     IBUPROFEN PO     ISOtretinoin (ACCUTANE) 40 MG capsule     magnesium 250 MG tablet     multivitamin w/minerals (THERA-VIT-M) tablet     norgestim-eth estrad triphasic (ORTHO TRI-CYCLEN LO) 0.18/0.215/0.25 MG-25 MCG tablet     valACYclovir (VALTREX) 1000 mg tablet     No current facility-administered medications for this visit.       ALLERGIES  No Known Allergies    OBSTETRIC/GYNECOLOGIC HISTORY  Last period:     Lab Results   Component Value Date    PAP NIL 2021    "   History of abnormal Pap smear: No    OB History    Para Term  AB Living   4 3 3 0 1 3   SAB IAB Ectopic Multiple Live Births   0 0 0 0 1      # Outcome Date GA Lbr Laurent/2nd Weight Sex Delivery Anes PTL Lv   4 Term 12 39w0d 03:10 / 00:24 3.204 kg (7 lb 1 oz) F  EPI N KARINA      Name: MARYBETH SANTOYO      Apgar1: 9  Apgar5: 9   3 Term 09/15/10 37w0d  3.459 kg (7 lb 10 oz) F CS-LTranv Spinal        Birth Comments: breech, complicated gestional hypertension      Name: Dara   2 AB 2007              Birth Comments: no complications   1 Term 94 40w4d 04:00 3.459 kg (7 lb 10 oz) F  EPI        Birth Comments: delivery c/b fever       PAST SURGICAL HISTORY   Past Surgical History:   Procedure Laterality Date     C BREAST PROSTHESIS, NOS  2004      SECTION       HERNIORRHAPHY VENTRAL  10/26/2012    Procedure: HERNIORRHAPHY VENTRAL;  Open Ventral Hernia Repair  ;  Surgeon: Shaun Clinton MD;  Location: [facility]     LAPAROSCOPIC CHOLECYSTECTOMY N/A 2018    Procedure: LAPAROSCOPIC CHOLECYSTECTOMY;  LAPAROSCOPIC CHOLECYSTECTOMY .;  Surgeon: Brandon Reagan MD;  Location: [facility] OR     LAPAROSCOPY DIAGNOSTIC (GYN) Bilateral 2016    Procedure: LAPAROSCOPY DIAGNOSTIC (GYN);  Surgeon: Ventura Flower MD;  Location: [facility]     LAPAROTOMY MINI, TUBAL LIGATION (POST PARTUM), COMBINED  2012    Procedure:COMBINED LAPAROTOMY MINI, TUBAL LIGATION (POST PARTUM); Surgeon:RAVEN YARBROUGH; [facility]     TUBOPLASTY REANASTOMOSIS Bilateral 2016    Procedure: TUBOPLASTY REANASTOMOSIS;  Surgeon: Ventura Flower MD;  Location: [facility]       SOCIAL HISTORY  Social History     Tobacco Use     Smoking status: Former Smoker     Packs/day: 1.00     Years: 15.00     Pack years: 15.00     Types: Cigarettes     Quit date: 2000     Years since quittin.3     Smokeless tobacco: Never Used   Substance Use Topics     Alcohol use: No     Alcohol/week: 0.0 standard  "drinks     Comment: occas     Drug use: No     Social History     Social History Narrative     Not on file       FAMILY HISTORY    Family History   Problem Relation Age of Onset     Hypertension Father      Uterine Cancer Sister         leiomyosarcoma     Hypertension Brother      Cerebrovascular Disease Maternal Grandmother 50     Macular Degeneration Paternal Grandmother      Eye Disorder Other         Great aunt, retinitis pigmentosa     Asthma No family hx of      C.A.D. No family hx of      Diabetes No family hx of      Breast Cancer No family hx of      Cancer - colorectal No family hx of      Prostate Cancer No family hx of      Glaucoma No family hx of        REVIEW OF SYSTEMS  A 10 point review of systems including Constitutional, Eyes, Respiratory, Cardiovascular, Gastroenterology, Genitourinary, Integumentary, Musculoskeletal, and Psychiatric, were all negative, except for pertinent positives noted in the above HPI.    OBJECTIVE  Ht 1.676 m (5' 6\")   Wt 76.7 kg (169 lb 1.6 oz)   BMI 27.29 kg/m      General: Alert, without distress  HEENT: normocephalic, without obvious abnormality   Breast: Within normal limits bilaterally, no nipple discharge, no lymphadenopathy; right breast with 2cm mobile nontender mass in RUO quadrant  Cardiovascular: regular rate and rhythm, no murmurs/rubs/gallops   Lungs: clear to auscultation bilaterally   Abdomen: soft, non-tender, non-distended, normal bowel sounds   Pelvic: deferred today   Extremities: normal    IMAGING:  Pelvic Ultrasound 2/9/21:  Abnormal anterior mid myoma- 2.5 x 2.4 x 2.1cm.   Comments:   single fibroid as described above otherwise normal pelvic ultrasound    Breast US and Mammography with Tomosynthesis 12/23/21:   IMPRESSION:   Benign cyst seen on ultrasound correlates with palpable lump and the mammographic abnormality. No findings to suggest malignancy.  ACR BI-RADS Category 2: Benign.    ASSESSMENT  Edwina Casiano Chikidanica Wynne is a 51 year old "  who is here for an annual examination    PLAN     Age 40-64 Annual Preventive Exam  1.  Screening   Cervical cancer: last pap 2021 NIL/HPV neg, due    Breast cancer: clinical breast exam today with noted breast cyst, Mammography up to date 2021, benign, repeat 2022   Colorectal cancer: completed per Edwina, repeat due in    Diabetes: HbA1c ordered today, if normal, due in    HIV: screening ordered today. If negative, repeat if new exposure concerns.   Thyroid disease: screening ordered today, if normal, due in 5 years   Hepatitis C:  screening ordered today. If negative, repeat if new exposure concerns.    2.  Immunizations   Tdap q 10 years , repeat    S/p COVID x 3    3. Menopause:   -discussed lifestyle changes, non-hormonal treatments, and hormonal treatments. Edwina was most interested in hormone replacement. We discussed the following risks of estrogen/progesterone replacement, including: DVT, breast cancer, uterine cancer, heart disease, and stroke; and benefits, including decreased all cause mortality, osteoporosis, and improvement in hot flashes, night sweats, and sleep disturbance. Edwina is interested in starting hormone replacement. Discussed use of climara patch weekly 0.375mg and daily prometrium 100mg. Plan for symptom eval in 3 months to make any necessary dose adjustments.     RTC in one year for an annual examination.    Lizzeth Mejia MD, MSCI    [facility]  22

## 2022-05-16 PROBLEM — N95.1 MENOPAUSAL SYNDROME (HOT FLASHES): Status: ACTIVE | Noted: 2022-05-16

## 2022-05-16 PROBLEM — Z00.00 ANNUAL PHYSICAL EXAM: Status: ACTIVE | Noted: 2022-05-16

## 2022-08-08 DIAGNOSIS — L20.89 OTHER ATOPIC DERMATITIS: ICD-10-CM

## 2022-08-08 RX ORDER — CLOBETASOL PROPIONATE 0.5 MG/ML
SOLUTION TOPICAL
Qty: 50 ML | Refills: 1 | Status: SHIPPED | OUTPATIENT
Start: 2022-08-08

## 2022-08-08 NOTE — TELEPHONE ENCOUNTER
Refill requested from [facility].  Refilled per protocol.  Pt last in clinic 5/11/22.

## 2022-08-24 ENCOUNTER — LAB (OUTPATIENT)
Dept: LAB | Facility: CLINIC | Age: 52
End: 2022-08-24
Payer: COMMERCIAL

## 2022-08-24 DIAGNOSIS — Z00.00 ANNUAL PHYSICAL EXAM: ICD-10-CM

## 2022-08-24 LAB
HBA1C MFR BLD: 5 % (ref 0–5.6)
TSH SERPL DL<=0.005 MIU/L-ACNC: 2.55 UIU/ML (ref 0.3–4.2)

## 2022-08-24 PROCEDURE — 87389 HIV-1 AG W/HIV-1&-2 AB AG IA: CPT

## 2022-08-24 PROCEDURE — 84443 ASSAY THYROID STIM HORMONE: CPT

## 2022-08-24 PROCEDURE — 36415 COLL VENOUS BLD VENIPUNCTURE: CPT

## 2022-08-24 PROCEDURE — 83036 HEMOGLOBIN GLYCOSYLATED A1C: CPT

## 2022-08-24 PROCEDURE — 86803 HEPATITIS C AB TEST: CPT

## 2022-08-25 LAB
HCV AB SERPL QL IA: NONREACTIVE
HIV 1+2 AB+HIV1 P24 AG SERPL QL IA: NONREACTIVE

## 2022-09-02 ENCOUNTER — APPOINTMENT (OUTPATIENT)
Dept: CT IMAGING | Facility: CLINIC | Age: 52
End: 2022-09-02
Attending: EMERGENCY MEDICINE
Payer: COMMERCIAL

## 2022-09-02 PROBLEM — J30.9 ALLERGIC RHINITIS: Status: ACTIVE | Noted: 2022-09-02

## 2022-09-02 LAB
BASOPHILS # BLD AUTO: 0 10E3/UL (ref 0–0.2)
BASOPHILS NFR BLD AUTO: 0 %
EOSINOPHIL # BLD AUTO: 0.1 10E3/UL (ref 0–0.7)
EOSINOPHIL NFR BLD AUTO: 1 %
ERYTHROCYTE [DISTWIDTH] IN BLOOD BY AUTOMATED COUNT: 11.6 % (ref 10–15)
HCT VFR BLD AUTO: 37.5 % (ref 35–47)
HGB BLD-MCNC: 13.4 G/DL (ref 11.7–15.7)
IMM GRANULOCYTES # BLD: 0 10E3/UL
IMM GRANULOCYTES NFR BLD: 0 %
LYMPHOCYTES # BLD AUTO: 1.9 10E3/UL (ref 0.8–5.3)
LYMPHOCYTES NFR BLD AUTO: 30 %
MCH RBC QN AUTO: 31.5 PG (ref 26.5–33)
MCHC RBC AUTO-ENTMCNC: 35.7 G/DL (ref 31.5–36.5)
MCV RBC AUTO: 88 FL (ref 78–100)
MONOCYTES # BLD AUTO: 0.5 10E3/UL (ref 0–1.3)
MONOCYTES NFR BLD AUTO: 7 %
NEUTROPHILS # BLD AUTO: 3.8 10E3/UL (ref 1.6–8.3)
NEUTROPHILS NFR BLD AUTO: 62 %
NRBC # BLD AUTO: 0 10E3/UL
NRBC BLD AUTO-RTO: 0 /100
PLATELET # BLD AUTO: 228 10E3/UL (ref 150–450)
RBC # BLD AUTO: 4.26 10E6/UL (ref 3.8–5.2)
WBC # BLD AUTO: 6.3 10E3/UL (ref 4–11)

## 2022-09-02 PROCEDURE — 250N000011 HC RX IP 250 OP 636: Performed by: EMERGENCY MEDICINE

## 2022-09-02 PROCEDURE — 36415 COLL VENOUS BLD VENIPUNCTURE: CPT | Performed by: EMERGENCY MEDICINE

## 2022-09-02 PROCEDURE — 74176 CT ABD & PELVIS W/O CONTRAST: CPT

## 2022-09-02 PROCEDURE — 96361 HYDRATE IV INFUSION ADD-ON: CPT

## 2022-09-02 PROCEDURE — 80053 COMPREHEN METABOLIC PANEL: CPT | Performed by: EMERGENCY MEDICINE

## 2022-09-02 PROCEDURE — 85025 COMPLETE CBC W/AUTO DIFF WBC: CPT | Performed by: EMERGENCY MEDICINE

## 2022-09-02 PROCEDURE — 81003 URINALYSIS AUTO W/O SCOPE: CPT | Performed by: EMERGENCY MEDICINE

## 2022-09-02 PROCEDURE — 99285 EMERGENCY DEPT VISIT HI MDM: CPT | Mod: 25

## 2022-09-02 PROCEDURE — 96375 TX/PRO/DX INJ NEW DRUG ADDON: CPT

## 2022-09-02 PROCEDURE — 83690 ASSAY OF LIPASE: CPT | Performed by: EMERGENCY MEDICINE

## 2022-09-02 PROCEDURE — 258N000003 HC RX IP 258 OP 636: Performed by: EMERGENCY MEDICINE

## 2022-09-02 PROCEDURE — 96374 THER/PROPH/DIAG INJ IV PUSH: CPT

## 2022-09-02 RX ORDER — SODIUM CHLORIDE 9 MG/ML
INJECTION, SOLUTION INTRAVENOUS CONTINUOUS
Status: DISCONTINUED | OUTPATIENT
Start: 2022-09-03 | End: 2022-09-03 | Stop reason: HOSPADM

## 2022-09-02 RX ORDER — ONDANSETRON 2 MG/ML
4 INJECTION INTRAMUSCULAR; INTRAVENOUS EVERY 30 MIN PRN
Status: DISCONTINUED | OUTPATIENT
Start: 2022-09-02 | End: 2022-09-03 | Stop reason: HOSPADM

## 2022-09-02 RX ORDER — SODIUM CHLORIDE 9 MG/ML
INJECTION, SOLUTION INTRAVENOUS CONTINUOUS
Status: DISCONTINUED | OUTPATIENT
Start: 2022-09-02 | End: 2022-09-03 | Stop reason: HOSPADM

## 2022-09-02 RX ORDER — KETOROLAC TROMETHAMINE 15 MG/ML
15 INJECTION, SOLUTION INTRAMUSCULAR; INTRAVENOUS ONCE
Status: COMPLETED | OUTPATIENT
Start: 2022-09-03 | End: 2022-09-02

## 2022-09-02 RX ADMIN — SODIUM CHLORIDE 1000 ML: 9 INJECTION, SOLUTION INTRAVENOUS at 23:49

## 2022-09-02 RX ADMIN — ONDANSETRON 4 MG: 2 INJECTION INTRAMUSCULAR; INTRAVENOUS at 23:49

## 2022-09-02 RX ADMIN — KETOROLAC TROMETHAMINE 15 MG: 15 INJECTION, SOLUTION INTRAMUSCULAR; INTRAVENOUS at 23:49

## 2022-09-02 ASSESSMENT — ENCOUNTER SYMPTOMS
ABDOMINAL PAIN: 1
NAUSEA: 0
VOMITING: 0
FEVER: 0

## 2022-09-03 ENCOUNTER — HOSPITAL ENCOUNTER (EMERGENCY)
Facility: CLINIC | Age: 52
Discharge: HOME | End: 2022-09-03
Attending: EMERGENCY MEDICINE | Admitting: EMERGENCY MEDICINE
Payer: COMMERCIAL

## 2022-09-03 ENCOUNTER — APPOINTMENT (OUTPATIENT)
Dept: ULTRASOUND IMAGING | Facility: CLINIC | Age: 52
End: 2022-09-03
Attending: EMERGENCY MEDICINE
Payer: COMMERCIAL

## 2022-09-03 VITALS
WEIGHT: 160 LBS | TEMPERATURE: 97.7 F | BODY MASS INDEX: 25.71 KG/M2 | HEART RATE: 78 BPM | OXYGEN SATURATION: 98 % | DIASTOLIC BLOOD PRESSURE: 82 MMHG | HEIGHT: 66 IN | SYSTOLIC BLOOD PRESSURE: 140 MMHG | RESPIRATION RATE: 18 BRPM

## 2022-09-03 DIAGNOSIS — R74.01 TRANSAMINITIS: ICD-10-CM

## 2022-09-03 LAB
ALBUMIN SERPL-MCNC: 4.1 G/DL (ref 3.5–5)
ALBUMIN UR-MCNC: NEGATIVE MG/DL
ALP SERPL-CCNC: 72 U/L (ref 45–120)
ALT SERPL W P-5'-P-CCNC: 187 U/L (ref 0–45)
ANION GAP SERPL CALCULATED.3IONS-SCNC: 11 MMOL/L (ref 5–18)
APPEARANCE UR: CLEAR
AST SERPL W P-5'-P-CCNC: 448 U/L (ref 0–40)
BACTERIA #/AREA URNS HPF: ABNORMAL /HPF
BILIRUB SERPL-MCNC: 0.4 MG/DL (ref 0–1)
BILIRUB UR QL STRIP: NEGATIVE
BUN SERPL-MCNC: 13 MG/DL (ref 8–22)
CALCIUM SERPL-MCNC: 9.7 MG/DL (ref 8.5–10.5)
CHLORIDE BLD-SCNC: 104 MMOL/L (ref 98–107)
CO2 SERPL-SCNC: 24 MMOL/L (ref 22–31)
COLOR UR AUTO: ABNORMAL
CREAT SERPL-MCNC: 0.65 MG/DL (ref 0.6–1.1)
GFR SERPL CREATININE-BSD FRML MDRD: >90 ML/MIN/1.73M2
GLUCOSE BLD-MCNC: 101 MG/DL (ref 70–125)
GLUCOSE UR STRIP-MCNC: NEGATIVE MG/DL
HGB UR QL STRIP: ABNORMAL
HYALINE CASTS: 9 /LPF
KETONES UR STRIP-MCNC: NEGATIVE MG/DL
LEUKOCYTE ESTERASE UR QL STRIP: NEGATIVE
LIPASE SERPL-CCNC: 26 U/L (ref 0–52)
MUCOUS THREADS #/AREA URNS LPF: PRESENT /LPF
NITRATE UR QL: NEGATIVE
PH UR STRIP: 5.5 [PH] (ref 5–7)
POTASSIUM BLD-SCNC: 3.5 MMOL/L (ref 3.5–5)
PROT SERPL-MCNC: 7.1 G/DL (ref 6–8)
RBC URINE: 2 /HPF
SODIUM SERPL-SCNC: 139 MMOL/L (ref 136–145)
SP GR UR STRIP: 1.01 (ref 1–1.03)
SQUAMOUS EPITHELIAL: <1 /HPF
UROBILINOGEN UR STRIP-MCNC: <2 MG/DL
WBC URINE: 2 /HPF

## 2022-09-03 PROCEDURE — 86705 HEP B CORE ANTIBODY IGM: CPT | Performed by: EMERGENCY MEDICINE

## 2022-09-03 PROCEDURE — 76705 ECHO EXAM OF ABDOMEN: CPT

## 2022-09-03 PROCEDURE — 36415 COLL VENOUS BLD VENIPUNCTURE: CPT | Performed by: EMERGENCY MEDICINE

## 2022-09-03 ASSESSMENT — ACTIVITIES OF DAILY LIVING (ADL): ADLS_ACUITY_SCORE: 35

## 2022-09-03 NOTE — ED PROVIDER NOTES
EMERGENCY DEPARTMENT ENCOUNTER      NAME: Edwina Wynne  AGE: 52 year old female  YOB: 1970  MRN: [id number]  EVALUATION DATE & TIME: No admission date for patient encounter.    PCP: Blanca Heard    ED PROVIDER: Jh Jarquin M.D.      Chief Complaint   Patient presents with     Abdominal Pain         FINAL IMPRESSION:  1. Transaminitis          ED COURSE & MEDICAL DECISION MAKING:    Pertinent Labs & Imaging studies reviewed. (See chart for details)  52 year old female presents to the Emergency Department for evaluation of right upper quadrant pain.  Has a history of cholecystectomy.  This happened when she was eating.  Is mildly tender in right upper quadrant.  Did start with a CT scan looking for possible kidney stone, colitis, enteritis less likely obstruction.  No right lower quadrant tenderness or signs of appendicitis.  Has had a hernia in the past.  CT scan is negative.  Patient does have mild elevation of LFTs of unclear significance.  Did get a right upper quadrant ultrasound this is normal.  I do not know what is causing her transaminitis.  Did add on a hepatitis panel.  We will follow-up with primary next week for recheck.  Denies any alcohol or Tylenol use.  Patient discharged home    11:22 PM I met with the patient to gather history and to perform my initial exam. I discussed the plan for care while in the Emergency Department. PPE: eye protection, surgical mask and nitrile gloves.  2:11 AM Results were communicated with the patient. Discussed discharge plans along with return precautions. Patient was agreeable with plan.        At the conclusion of the encounter I discussed the results of all of the tests and the disposition. The questions were answered. The patient or family acknowledged understanding and was agreeable with the care plan.            MEDICATIONS GIVEN IN THE EMERGENCY:  Medications   0.9% sodium chloride BOLUS (0 mLs Intravenous Stopped 9/3/22  0110)     Followed by   sodium chloride 0.9% infusion (has no administration in time range)   ondansetron (ZOFRAN) injection 4 mg (4 mg Intravenous Given 9/2/22 2349)   0.9% sodium chloride BOLUS (has no administration in time range)     Followed by   sodium chloride 0.9% infusion (has no administration in time range)   ketorolac (TORADOL) injection 15 mg (15 mg Intravenous Given 9/2/22 2349)       NEW PRESCRIPTIONS STARTED AT TODAY'S ER VISIT  Discharge Medication List as of 9/3/2022  2:18 AM             =================================================================    HPI    Patient information was obtained from: patient     Use of : N/A         Edwina Wynne is a 52 year old female with a pertinent history of ventral hernia, hypertension, tubal ligation, s/p cholecystectomy, who presents to this ED via walk in for evaluation of right upper quadrant abdominal pain.    Patient here with sudden onset of right upper quadrant pain after eating. She states it feels similar to the pain when she had to have her gallbladder removed. Pain was 10/10 at first but did eventually go away as well as her nausea. When she was taking her contacts out, her pain came again which prompt her to come into ED. She states that the pain is not as intense as the first time and she no longer feels nauseous. No fever. She has had prior incisional hernia. No other medical complaints at this time.       REVIEW OF SYSTEMS   Review of Systems   Constitutional: Negative for fever.   Gastrointestinal: Positive for abdominal pain (RUQ). Negative for nausea and vomiting.   All other systems reviewed and are negative.       PAST MEDICAL HISTORY:  Past Medical History:   Diagnosis Date     Anemia     PREGNANCY     Herpes simplex without mention of complication      HSV      Hypertension     Gestational     PONV (postoperative nausea and vomiting)     POST C SECTION     Pre-eclampsia        PAST SURGICAL HISTORY:  Past  Surgical History:   Procedure Laterality Date     C BREAST PROSTHESIS, NOS  2004      SECTION       HERNIORRHAPHY VENTRAL  10/26/2012    Procedure: HERNIORRHAPHY VENTRAL;  Open Ventral Hernia Repair  ;  Surgeon: Shaun Clinton MD;  Location: [facility] OR     LAPAROSCOPIC CHOLECYSTECTOMY N/A 2018    Procedure: LAPAROSCOPIC CHOLECYSTECTOMY;  LAPAROSCOPIC CHOLECYSTECTOMY .;  Surgeon: Brandon Reagan MD;  Location: [facility] OR     LAPAROSCOPY DIAGNOSTIC (GYN) Bilateral 2016    Procedure: LAPAROSCOPY DIAGNOSTIC (GYN);  Surgeon: Ventura Flower MD;  Location: [facility]     LAPAROTOMY MINI, TUBAL LIGATION (POST PARTUM), COMBINED  2012    Procedure:COMBINED LAPAROTOMY MINI, TUBAL LIGATION (POST PARTUM); Surgeon:RAVEN YARBROUGH; Location:[facility]     TUBOPLASTY REANASTOMOSIS Bilateral 2016    Procedure: TUBOPLASTY REANASTOMOSIS;  Surgeon: Ventura Flower MD;  Location: [facility]           CURRENT MEDICATIONS:    Current Facility-Administered Medications   Medication     0.9% sodium chloride BOLUS    Followed by     sodium chloride 0.9% infusion     ondansetron (ZOFRAN) injection 4 mg     sodium chloride 0.9% infusion     Current Outpatient Medications   Medication     Calcium Carbonate-Vitamin D (CALCIUM + D PO)     clobetasol (TEMOVATE) 0.05 % external solution     estradiol (CLIMARA) 0.0375 MG/24HR weekly patch     ISOtretinoin (ACCUTANE) 40 MG capsule     multivitamin w/minerals (THERA-VIT-M) tablet     progesterone (PROMETRIUM) 100 MG capsule     valACYclovir (VALTREX) 1000 mg tablet         ALLERGIES:  No Known Allergies    FAMILY HISTORY:  Family History   Problem Relation Age of Onset     Hypertension Father      Uterine Cancer Sister         leiomyosarcoma     Hypertension Brother      Cerebrovascular Disease Maternal Grandmother 50     Macular Degeneration Paternal Grandmother      Eye Disorder Other         Great aunt, retinitis pigmentosa     Asthma No family hx of      C.A.D. No  "family hx of      Diabetes No family hx of      Breast Cancer No family hx of      Cancer - colorectal No family hx of      Prostate Cancer No family hx of      Glaucoma No family hx of        SOCIAL HISTORY:   Social History     Socioeconomic History     Marital status:    Tobacco Use     Smoking status: Former Smoker     Packs/day: 1.00     Years: 15.00     Pack years: 15.00     Types: Cigarettes     Quit date: 2000     Years since quittin.6     Smokeless tobacco: Never Used   Substance and Sexual Activity     Alcohol use: No     Alcohol/week: 0.0 standard drinks     Comment: occas     Drug use: No     Sexual activity: Yes     Partners: Male     Birth control/protection: Female Surgical     Comment: tubal ligation       VITALS:  BP (!) 140/82   Pulse 78   Temp 97.7  F (36.5  C) (Oral)   Resp 18   Ht 1.676 m (5' 6\")   Wt 72.6 kg (160 lb)   SpO2 98%   BMI 25.82 kg/m      PHYSICAL EXAM    Physical Exam  Constitutional:       General: She is not in acute distress.     Appearance: She is not diaphoretic.   HENT:      Head: Atraumatic.      Mouth/Throat:      Pharynx: No oropharyngeal exudate.   Eyes:      General: No scleral icterus.     Pupils: Pupils are equal, round, and reactive to light.   Cardiovascular:      Heart sounds: Normal heart sounds.   Pulmonary:      Effort: No respiratory distress.      Breath sounds: Normal breath sounds.   Abdominal:      Palpations: Abdomen is soft.      Tenderness: There is no abdominal tenderness. There is no guarding or rebound.   Musculoskeletal:         General: No tenderness.   Skin:     General: Skin is warm.      Findings: No rash.   Neurological:      General: No focal deficit present.      Mental Status: She is alert.           LAB:  All pertinent labs reviewed and interpreted.  Labs Ordered and Resulted from Time of ED Arrival to Time of ED Departure   COMPREHENSIVE METABOLIC PANEL - Abnormal       Result Value    Sodium 139      Potassium 3.5      " Chloride 104      Carbon Dioxide (CO2) 24      Anion Gap 11      Urea Nitrogen 13      Creatinine 0.65      Calcium 9.7      Glucose 101      Alkaline Phosphatase 72       (*)      (*)     Protein Total 7.1      Albumin 4.1      Bilirubin Total 0.4      GFR Estimate >90     ROUTINE UA WITH MICROSCOPIC REFLEX TO CULTURE - Abnormal    Color Urine Light Yellow      Appearance Urine Clear      Glucose Urine Negative      Bilirubin Urine Negative      Ketones Urine Negative      Specific Gravity Urine 1.013      Blood Urine 0.06 mg/dL (*)     pH Urine 5.5      Protein Albumin Urine Negative      Urobilinogen Urine <2.0      Nitrite Urine Negative      Leukocyte Esterase Urine Negative      Bacteria Urine Few (*)     Mucus Urine Present (*)     RBC Urine 2      WBC Urine 2      Squamous Epithelials Urine <1      Hyaline Casts Urine 9 (*)    LIPASE - Normal    Lipase 26     CBC WITH PLATELETS AND DIFFERENTIAL    WBC Count 6.3      RBC Count 4.26      Hemoglobin 13.4      Hematocrit 37.5      MCV 88      MCH 31.5      MCHC 35.7      RDW 11.6      Platelet Count 228      % Neutrophils 62      % Lymphocytes 30      % Monocytes 7      % Eosinophils 1      % Basophils 0      % Immature Granulocytes 0      NRBCs per 100 WBC 0      Absolute Neutrophils 3.8      Absolute Lymphocytes 1.9      Absolute Monocytes 0.5      Absolute Eosinophils 0.1      Absolute Basophils 0.0      Absolute Immature Granulocytes 0.0      Absolute NRBCs 0.0     ACUTE HEPATITIS PANEL       RADIOLOGY:  Reviewed all pertinent imaging. Please see official radiology report.  Abdomen US, limited (RUQ only)   Final Result   IMPRESSION:   1.  Normal right upper quadrant post cholecystectomy.            CT Abdomen Pelvis w/o Contrast   Final Result   IMPRESSION:    1.  Small bilateral intrarenal stones. No ureteral stone or hydronephrosis.   2.  Colonic diverticula without acute diverticulitis. No bowel obstruction or inflammation.                PROCEDURES:   None      I, Kaykay Morrow, am serving as a scribe to document services personally performed by Dr. Jh Jarquin, based on my observation and the provider's statements to me. I, Jh Jarquin MD attest that Kaykay Morrow is acting in a scribe capacity, has observed my performance of the services and has documented them in accordance with my direction.    Jh Jarquin M.D.  Emergency Medicine  [facility] EMERGENCY ROOM  [address] [address]  [phone]  Dept: [phone]     Jh Jarquin MD  09/03/22 0435

## 2022-09-03 NOTE — ED TRIAGE NOTES
Pt reports about an hour prior to arrival, she was eating and began to have severe upper abdominal pain. States it felt severe like when she had to have a cholecystectomy. Reports nausea, but denies emesis or diarrhea.     Triage Assessment     Row Name 09/02/22 2057       Triage Assessment (Adult)    Airway WDL WDL       Respiratory WDL    Respiratory WDL WDL       Skin Circulation/Temperature WDL    Skin Circulation/Temperature WDL WDL       Cardiac WDL    Cardiac WDL WDL       Peripheral/Neurovascular WDL    Peripheral Neurovascular WDL WDL       Cognitive/Neuro/Behavioral WDL    Cognitive/Neuro/Behavioral WDL WDL

## 2022-09-05 LAB
HAV IGM SERPL QL IA: NONREACTIVE
HBV CORE IGM SERPL QL IA: NONREACTIVE
HBV SURFACE AG SERPL QL IA: NONREACTIVE
HCV AB SERPL QL IA: NONREACTIVE

## 2022-09-16 ENCOUNTER — ANCILLARY PROCEDURE (OUTPATIENT)
Dept: GENERAL RADIOLOGY | Facility: CLINIC | Age: 52
End: 2022-09-16
Attending: NURSE PRACTITIONER
Payer: COMMERCIAL

## 2022-09-16 ENCOUNTER — OFFICE VISIT (OUTPATIENT)
Dept: FAMILY MEDICINE | Facility: CLINIC | Age: 52
End: 2022-09-16
Payer: COMMERCIAL

## 2022-09-16 VITALS
OXYGEN SATURATION: 98 % | HEART RATE: 80 BPM | DIASTOLIC BLOOD PRESSURE: 80 MMHG | BODY MASS INDEX: 27.55 KG/M2 | WEIGHT: 170.7 LBS | SYSTOLIC BLOOD PRESSURE: 122 MMHG

## 2022-09-16 DIAGNOSIS — M25.551 HIP PAIN, RIGHT: ICD-10-CM

## 2022-09-16 DIAGNOSIS — M25.551 HIP PAIN, RIGHT: Primary | ICD-10-CM

## 2022-09-16 PROCEDURE — 90471 IMMUNIZATION ADMIN: CPT | Performed by: NURSE PRACTITIONER

## 2022-09-16 PROCEDURE — 90682 RIV4 VACC RECOMBINANT DNA IM: CPT | Performed by: NURSE PRACTITIONER

## 2022-09-16 PROCEDURE — 73502 X-RAY EXAM HIP UNI 2-3 VIEWS: CPT | Mod: TC | Performed by: RADIOLOGY

## 2022-09-16 PROCEDURE — 99213 OFFICE O/P EST LOW 20 MIN: CPT | Mod: 25 | Performed by: NURSE PRACTITIONER

## 2022-09-16 RX ORDER — METHYLPREDNISOLONE 4 MG
TABLET, DOSE PACK ORAL
Qty: 21 TABLET | Refills: 0 | Status: SHIPPED | OUTPATIENT
Start: 2022-09-16 | End: 2022-10-24

## 2022-09-16 ASSESSMENT — PAIN SCALES - GENERAL: PAINLEVEL: MODERATE PAIN (4)

## 2022-09-16 NOTE — PROGRESS NOTES
Assessment and Plan:     Hip pain, right  Differentials include right hip osteoarthritis, bursitis, labral tear.  Patient also walked in height for a prolonged period.  She may have some accessory muscles which are inflamed and causing myofascial pain.  Discussed symptomatic treatment including rest, ice, stretching activities.  We will treat with Medrol Dosepak, take as directed.  Educated on its indications and side effects.  She declines muscle relaxant.  Recommend she consider foam rolling.  If no improvement in symptoms, may consider referral to orthopedics.  She is content with the plan.  - methylPREDNISolone (MEDROL DOSEPAK) 4 MG tablet therapy pack  Dispense: 21 tablet; Refill: 0  - XR Hip Right 2-3 Views        Subjective:     Edwina is a 52 year old female presenting to the clinic for concerns for right hip pain for 3 to 4 weeks.  She denies any known injury.  She has not noticed erythema, swelling, bruising.  Pain developed [place] [employer]  Patient works in a .  She feels as though she got hit by an object.  She complains of a constant ache which is exacerbated with sitting for prolonged periods.  She denies numbness and tingling.  She has not had any radicular pain.  She has been taking Tylenol.  Her sister has cancer which has metastasized to the bone.  She is interested in x-ray today.    Reviewof Systems: A complete 14 point review of systems was obtained and is negative or as stated in the history of present illness.    Social History     Socioeconomic History     Marital status:      Spouse name: Not on file     Number of children: Not on file     Years of education: Not on file     Highest education level: Not on file   Occupational History     Not on file   Tobacco Use     Smoking status: Former Smoker     Packs/day: 1.00     Years: 15.00     Pack years: 15.00     Types: Cigarettes     Quit date: 1/1/2000     Years  Patient on diet therapy.  Lab tests ordered.   since quittin.7     Smokeless tobacco: Never Used   Substance and Sexual Activity     Alcohol use: No     Alcohol/week: 0.0 standard drinks     Comment: occas     Drug use: No     Sexual activity: Yes     Partners: Male     Birth control/protection: Female Surgical     Comment: tubal ligation   Other Topics Concern     Parent/sibling w/ CABG, MI or angioplasty before 65F 55M? Not Asked   Social History Narrative     Not on file     Social Determinants of Health     Financial Resource Strain: Not on file   Food Insecurity: Not on file   Transportation Needs: Not on file   Physical Activity: Not on file   Stress: Not on file   Social Connections: Not on file   Intimate Partner Violence: Not on file   Housing Stability: Not on file       Active Ambulatory Problems     Diagnosis Date Noted     Tubal ligation status 2013     Ventral hernia, recurrent 2013     Essential hypertension, benign 2013     Tubal occlusion 2016     Choledocholithiasis with acute cholecystitis 2018     Annual physical exam 2022     Menopausal syndrome (hot flashes) 2022     Allergic rhinitis 2022     Dyslipidemia 2014     Genital herpes 2007     Palpitations 2014     Resolved Ambulatory Problems     Diagnosis Date Noted     AMA-normal, 1st tri screen, quad & anatomy scan 2012     History of herpes genitalis- plan Valtrex 36 wks 2012     Prior , desires  2012     Desires tubal ligation- post partum or with c/s, has private insurance 2012     Hx of preeclampsia, prior pregnancy, currently pregnant 2012     Past Medical History:   Diagnosis Date     Anemia      Herpes simplex without mention of complication      HSV      Hypertension      PONV (postoperative nausea and vomiting)      Pre-eclampsia        Family History   Problem Relation Age of Onset     Hypertension Father      Uterine Cancer Sister         leiomyosarcoma      Hypertension Brother      Cerebrovascular Disease Maternal Grandmother 50     Macular Degeneration Paternal Grandmother      Eye Disorder Other         Great aunt, retinitis pigmentosa     Asthma No family hx of      C.A.D. No family hx of      Diabetes No family hx of      Breast Cancer No family hx of      Cancer - colorectal No family hx of      Prostate Cancer No family hx of      Glaucoma No family hx of        Objective:     /80 (BP Location: Left arm, Patient Position: Sitting, Cuff Size: Adult Regular)   Pulse 80   Wt 77.4 kg (170 lb 11.2 oz)   LMP  (LMP Unknown)   SpO2 98%   BMI 27.55 kg/m      Patient is alert, in no obvious distress.   Skin: Warm, dry.    Lungs:  Clear to auscultation. Respirations even and unlabored.  No wheezing or rales noted.   Heart:  Regular rate and rhythm.  No murmurs, S3, S4, gallops, or rubs.    Musculoskeletal: She has full range of motion of her right hip.  She is able to swallow without difficulty.  She is tender to palpation of her trochanteric bursa.    LABORATORY: I ordered and personally reviewed right hip x-rays showing no obvious fracture.  Will have radiology review.

## 2022-10-24 ENCOUNTER — LAB (OUTPATIENT)
Dept: LAB | Facility: CLINIC | Age: 52
End: 2022-10-24
Attending: INTERNAL MEDICINE
Payer: COMMERCIAL

## 2022-10-24 ENCOUNTER — OFFICE VISIT (OUTPATIENT)
Dept: INTERNAL MEDICINE | Facility: CLINIC | Age: 52
End: 2022-10-24
Attending: INTERNAL MEDICINE
Payer: COMMERCIAL

## 2022-10-24 VITALS — BODY MASS INDEX: 27.76 KG/M2 | WEIGHT: 172 LBS

## 2022-10-24 DIAGNOSIS — R74.8 ELEVATED LIVER ENZYMES: ICD-10-CM

## 2022-10-24 DIAGNOSIS — M25.551 HIP PAIN, RIGHT: ICD-10-CM

## 2022-10-24 DIAGNOSIS — R74.8 ELEVATED LIVER ENZYMES: Primary | ICD-10-CM

## 2022-10-24 DIAGNOSIS — L20.89 OTHER ATOPIC DERMATITIS: ICD-10-CM

## 2022-10-24 DIAGNOSIS — N20.0 NEPHROLITHIASIS: ICD-10-CM

## 2022-10-24 DIAGNOSIS — N95.1 MENOPAUSAL SYNDROME (HOT FLASHES): ICD-10-CM

## 2022-10-24 LAB
ALBUMIN SERPL-MCNC: 4.1 G/DL (ref 3.4–5)
ALP SERPL-CCNC: 71 U/L (ref 40–150)
ALT SERPL W P-5'-P-CCNC: 18 U/L (ref 0–50)
AST SERPL W P-5'-P-CCNC: 15 U/L (ref 0–45)
BILIRUB DIRECT SERPL-MCNC: 0.1 MG/DL (ref 0–0.2)
BILIRUB SERPL-MCNC: 0.3 MG/DL (ref 0.2–1.3)
PROT SERPL-MCNC: 7.5 G/DL (ref 6.8–8.8)

## 2022-10-24 PROCEDURE — 80076 HEPATIC FUNCTION PANEL: CPT

## 2022-10-24 PROCEDURE — 99214 OFFICE O/P EST MOD 30 MIN: CPT | Performed by: INTERNAL MEDICINE

## 2022-10-24 PROCEDURE — G0463 HOSPITAL OUTPT CLINIC VISIT: HCPCS

## 2022-10-24 PROCEDURE — 36415 COLL VENOUS BLD VENIPUNCTURE: CPT

## 2022-10-24 RX ORDER — PROGESTERONE 100 MG/1
100 CAPSULE ORAL DAILY
Qty: 90 CAPSULE | Refills: 4 | Status: CANCELLED | OUTPATIENT
Start: 2022-10-24

## 2022-10-24 RX ORDER — ESTRADIOL 0.04 MG/D
1 PATCH TRANSDERMAL WEEKLY
Qty: 12 PATCH | Refills: 4 | Status: CANCELLED | OUTPATIENT
Start: 2022-10-24

## 2022-10-24 RX ORDER — CLOBETASOL PROPIONATE 0.5 MG/ML
SOLUTION TOPICAL
Qty: 50 ML | Refills: 1 | Status: CANCELLED | OUTPATIENT
Start: 2022-10-24

## 2022-10-24 ASSESSMENT — PAIN SCALES - GENERAL: PAINLEVEL: MODERATE PAIN (4)

## 2022-10-24 NOTE — LETTER
"10/24/2022       RE: Edwina Wynne  [address]  [facility] [address]     Dear Colleague,    Thank you for referring your patient, Edwina Wynne, to the [facility] at [facility]. Please see a copy of my visit note below.      Assessment & Plan       Elevated liver enzymes  Discussed possible causes of elevated liver enzymes and pain episodes.  Suspect possible choledocholithiasis.  Will check LFTs today to evaluate.  If results abnormal, may consider MRCP for further evaluation.  If LFTs are elevated we will proceed with for further evaluation of hepatitis.  Patient will be contacted with test results accordingly.    - Hepatic Panel; Future    Hip pain, right  Patient has lateral hip pain, suspect possible IT band syndrome.Recommend orthopedic surgery evaluation.  - Orthopedic  Referral    Nephrolithiasis  CT of the abdomen was significant for nephrolithiasis.  Recommend urology evaluation referral was placed today.  - Adult Urology  Referral; Future      I spent a total of 30 minutes on the day of the visit.   Time spent doing chart review, history and exam, documentation and further activities per the note       BMI:   Estimated body mass index is 27.76 kg/m  as calculated from the following:    Height as of 9/2/22: 1.676 m (5' 6\").    Weight as of this encounter: 78 kg (172 lb).           No follow-ups on file.    Blanca Heard MD  [facility]    Judy Bland is a 52 year old, presenting for the following health issues:  RECHECK (ER visit)      HPI     Patient reports that she was seen in the ER recently and was advised that she had elevated LFTs. She reports that she has not had any significant episodes of pain, however, has some mild episodes that she describes as cramping. She denies significant alcohol use. She has been taking " Tylenol up to twice daily.     Review of Systems   Constitutional, HEENT, cardiovascular, pulmonary, GI, , musculoskeletal, neuro, skin, endocrine and psych systems are negative, except as otherwise noted.     Objective    Wt 78 kg (172 lb)   LMP  (LMP Unknown)   BMI 27.76 kg/m    Body mass index is 27.76 kg/m .  Physical Exam   GENERAL: healthy, alert and no distress  EYES: Eyes grossly normal to inspection, PERRL and conjunctivae and sclerae normal  NECK: no adenopathy, no asymmetry, masses, or scars and thyroid normal to palpation  RESP: normal effort, no wheezing  CV: regular rates and rhythm and no peripheral edema  ABDOMEN: soft, nontender and bowel sounds normal  MS: no gross musculoskeletal defects noted, no edema                   Again, thank you for allowing me to participate in the care of your patient.      Sincerely,    Blanca Heard MD

## 2022-10-24 NOTE — PROGRESS NOTES
"  Assessment & Plan       Elevated liver enzymes  Discussed possible causes of elevated liver enzymes and pain episodes.  Suspect possible choledocholithiasis.  Will check LFTs today to evaluate.  If results abnormal, may consider MRCP for further evaluation.  If LFTs are elevated we will proceed with for further evaluation of hepatitis.  Patient will be contacted with test results accordingly.    - Hepatic Panel; Future    Hip pain, right  Patient has lateral hip pain, suspect possible IT band syndrome.Recommend orthopedic surgery evaluation.  - Orthopedic  Referral    Nephrolithiasis  CT of the abdomen was significant for nephrolithiasis.  Recommend urology evaluation referral was placed today.  - Adult Urology  Referral; Future      I spent a total of 30 minutes on the day of the visit.   Time spent doing chart review, history and exam, documentation and further activities per the note       BMI:   Estimated body mass index is 27.76 kg/m  as calculated from the following:    Height as of 9/2/22: 1.676 m (5' 6\").    Weight as of this encounter: 78 kg (172 lb).           No follow-ups on file.    Blanca Heard MD  [facility]    Judy Bland is a 52 year old, presenting for the following health issues:  RECHECK (ER visit)      HPI     Patient reports that she was seen in the ER recently and was advised that she had elevated LFTs. She reports that she has not had any significant episodes of pain, however, has some mild episodes that she describes as cramping. She denies significant alcohol use. She has been taking Tylenol up to twice daily.     Review of Systems   Constitutional, HEENT, cardiovascular, pulmonary, GI, , musculoskeletal, neuro, skin, endocrine and psych systems are negative, except as otherwise noted.      Objective    Wt 78 kg (172 lb)   LMP  (LMP Unknown)   BMI 27.76 kg/m    Body mass index is 27.76 kg/m .  Physical Exam   GENERAL: " healthy, alert and no distress  EYES: Eyes grossly normal to inspection, PERRL and conjunctivae and sclerae normal  NECK: no adenopathy, no asymmetry, masses, or scars and thyroid normal to palpation  RESP: normal effort, no wheezing  CV: regular rates and rhythm and no peripheral edema  ABDOMEN: soft, nontender and bowel sounds normal  MS: no gross musculoskeletal defects noted, no edema

## 2022-10-24 NOTE — LETTER
Date:October 25, 2022      Provider requested that no letter be sent. Do not send.       [facility]

## 2022-10-25 ENCOUNTER — TELEPHONE (OUTPATIENT)
Dept: INTERNAL MEDICINE | Facility: CLINIC | Age: 52
End: 2022-10-25

## 2022-10-25 DIAGNOSIS — R10.11 RUQ ABDOMINAL PAIN: Primary | ICD-10-CM

## 2022-10-25 NOTE — TELEPHONE ENCOUNTER
Discussed results with patient, recommend further evaluation with MRCP and GI consult. Suspect choledocholithiasis.   Patient is in agreement with the plan.  Blanca Heard MD

## 2022-10-26 ENCOUNTER — VIRTUAL VISIT (OUTPATIENT)
Dept: UROLOGY | Facility: CLINIC | Age: 52
End: 2022-10-26
Payer: COMMERCIAL

## 2022-10-26 ENCOUNTER — TELEPHONE (OUTPATIENT)
Dept: UROLOGY | Facility: CLINIC | Age: 52
End: 2022-10-26

## 2022-10-26 DIAGNOSIS — N20.0 CALCULUS OF KIDNEY: Primary | ICD-10-CM

## 2022-10-26 PROCEDURE — 99203 OFFICE O/P NEW LOW 30 MIN: CPT | Mod: 95 | Performed by: PHYSICIAN ASSISTANT

## 2022-10-26 RX ORDER — OXYCODONE HYDROCHLORIDE 5 MG/1
5 TABLET ORAL EVERY 6 HOURS PRN
Qty: 12 TABLET | Refills: 0 | Status: SHIPPED | OUTPATIENT
Start: 2022-10-26 | End: 2022-10-29

## 2022-10-26 RX ORDER — ONDANSETRON 4 MG/1
4 TABLET, ORALLY DISINTEGRATING ORAL EVERY 8 HOURS PRN
Qty: 15 TABLET | Refills: 1 | Status: SHIPPED | OUTPATIENT
Start: 2022-10-26 | End: 2023-03-21

## 2022-10-26 ASSESSMENT — PAIN SCALES - GENERAL: PAINLEVEL: NO PAIN (0)

## 2022-10-26 NOTE — PROGRESS NOTES
Patient is roomed via telephone for a virtual visit.  Patient confirmed she is in the [facility] at the time of this appointment.  Patient understands that this visit is billable and agree to proceed with appointment.

## 2022-10-26 NOTE — PATIENT INSTRUCTIONS
Patient Stated Goal: Prevent further stones    Symptom Control While Passing A Stone    The goal of [facility] is to let a smaller kidney stone (less than 4 to 5 mm) pass without intervention if possible. Giving your body a chance to clear the stone may take a few hours up to a few weeks.  Keeping you well-informed, safe and fairly comfortable is important.    Drink to thirst  Do not attempt to  flush out  your stone by drinking too much fluid. This does not work and may increase nausea. Drink enough to satisfy your body s thirst. Eating your normal diet is fine.   Medications (that may be suggested or prescribed)  Ibuprofen (Advil or Motrin) Available over the counter  Take two (200mg) tablets every six hours as needed  Prevents spasm of the ureter.    Decreases pain.    Tylenol (Acetaminophen) Available over the counter  Take two (200mg) tablets every six hours as needed  Prevents spasm of the ureter.    Decreases pain.    Dramamine* (drowsy version, non-generic formulation) Available over the counter  Take 50mg at bedtime  Decreases spasms of the ureter  Decreases nausea  Decreases acute pain  Decreases recurrence of pain for 24 hours  Will help you sleep  *This medication will cause increase drowsiness, do not drive or operate machinery for 6 hours.    Narcotics (Oxycodone, Dilaudid) Take as prescribed for severe pain unrelieved by ibuprofen and Dramamine  Narcotics have significant side effects and only  cover-up  pain. They have no effect on the cause of pain.  Common side effects  Confusion, disorientation and sedation - DO NOT DRIVE OR OPERATE MACHINERY WITHIN 24 HOURS  Nausea - take Dramamine or Zofran or Haldol to help control  Constipation  Sleep disturbances    Ondansetron (Zofran) Take as prescribed  Reserve for severe nausea  May cause constipation, start over the counter Miralax if needed    Second Line Anti-Nausea Medication: Adding a different anti-nausea medication maybe helpful for  persistent nausea.  The combined effect of different types of anti-nausea medications maybe more effective than either medication by itself, even in higher doses.  Compazine: Take as prescribed      Information about kidney stones  Crystals can form if chemicals are too concentrated in your urine. If the crystal grows over time, a stone may form. A stone usually isn t painful while it is still in the kidney.  As the stone begins to leave the kidney, you may experience episodes of flank pain as the kidney stone approaches the entrance to the ureter. Some people feel a vague ache in the side.  Kidney stones may fall into the ureter. Some stones are tiny and pass through without causing symptoms. The ureter is a small tube (approximately 1/8 of an inch wide). A kidney stone can get stuck and block the ureter. If this happens, urine backs up and flows back to the kidney. Back pressure on the kidney can cause:  Severe flank pain radiating to the groin.  Severe nausea and vomiting.  The pain can occur in the lower back, side, groin or all three.    When the stone reaches the lower ureter, this can irritate the bladder and sensations of feeling the urge to urinate frequently and urgently may occur.    Once the stone passes out of your ureter and into your bladder, the symptoms of urgency and frequency will often disappear. Sometimes pain will come back for a short period and will not be as severe as before. The passage of the stone from your bladder and out of your body is usually not a problem. The urethra is at least twice as wide as the normal ureter, so the stone doesn t usually block it.    Strain all urine  If you pass the stone, save it. Place it in the container we have provided and bring it to the [facility] within a week of passing it. Your stone will then be sent for analysis which takes about a month.     Signs and symptoms you might experience  Nausea  Decreased appetite  Urinary frequency  Bloody  urine   Chills  Fatigue    When to call [facility] or go to the Emergency Room  Fever with a temperature greater than 100.1  Severe pain  Persistent nausea/vomiting    If the pain worsens or nausea/vomiting is uncontrolled with medications, STOP eating & drinking. You need to have an empty stomach for 8 hours prior to surgery. Call the [facility] immediately at [phone].           Follow-up  Low dose CT scan with doctor visit 1-2 weeks after initial clinic visit per doctor s instructions    Please cancel the CT scan visit if you pass a stone. Reschedule for a one month follow-up with doctor to discuss stone composition and future prevention.    Preventing future stones    Approximately a month after your stone is sent out for analysis, a prevention visit will occur with your provider, to discuss an individualized plan for prevention of new stones and to discuss managing stones that you may still have. Along with the analysis of the kidney stone, blood and urine tests may be indicated to develop this plan. Knowing the type of kidney stones you make, and why, allows the providers at the [facility] to recommend specific ways to prevent them.    Follow-up visits are an important part of monitoring and preventing future re-occurrences.    The [facility] is available for questions or concerns 24 hours a day at [phone]

## 2022-10-26 NOTE — PROGRESS NOTES
Assessment/Plan:    Assessment & Plan   Edwina was seen today for new patient.    Diagnoses and all orders for this visit:    Calculus of kidney  -     ondansetron (ZOFRAN ODT) 4 MG ODT tab; Take 1 tablet (4 mg) by mouth every 8 hours as needed  -     oxyCODONE (ROXICODONE) 5 MG tablet; Take 1 tablet (5 mg) by mouth every 6 hours as needed for pain  -     Patient Stated Goal: Prevent further stones    Stone Management Plan  Stone Management 10/26/2022   Urinary Tract Infection No suspicion of infection   Renal Colic Asymptomatic at this time   Renal Failure No suspicion of renal failure   Current CT date 9/3/2022   Right sided stones? Yes   R Number of ureteral stones No ureteral stones   R Number of kidney stones  1   R GSD of kidney stones < 2   R Hydronephrosis None   R Stone Event No current event   R Current Plan Observe   Observe rationale Limited stone burden with good prognosis for spontaneous passage   Left sided stones? Yes   L Number of ureteral stones No ureteral stones   L Number of kidney stones  1   L GSD of kidney stones 2 - 4   L Hydronephrosis None   L Stone Event No current event   L Current Plan Observe   Observe rationale Limited stone burden with good prognosis for spontaneous passage         PLAN    51 yo F first time stone former with prior ER visit for acute abdominal pain with elevated liver enzymes, pending GI referral. Incidental diagnosis of nonobstructing renal stones.    Will initiate comprehensive stone risk evaluation. Two 24 hour urine collections and dietary journal will be obtained at earliest covenience. Patient verbalized understanding. Patient agrees with plan as discussed. She will return to clinic when labs are available.      Discussed stone management pain protocol in event of future kidney stone episodes. Provided zofran and oxycodone. OTC medications including dramamine, ibuprofen and tylenol were reviewed.     Video call duration: 14 minutes  Provider off-site  21  minutes spent on the date of the encounter doing chart review, history and exam, documentation and further activities per the note    Helen Chan PA-C  [facility]    Subjective:     HPI  Ms. Edwina Wynne is a 52 year old  female who is being evaluated via a billable video visit by [facility] following ER visit > 1 month ago for abdominal pain.    She is a first time unidentified composition stone former. She has no identified modifiable stone risk factors. She has no identified non-modifiable stone risk factors.    She was seen in ER 9/2/22 for acute onset right upper abdominal pain occurring after eating. The pain was 10/10 and subsided after several minutes but later recurred with less intensity. She had associated nausea. She reported pain felt similar to when she had to have her gallbladder removed. Workup in ER was notable for CT remarking nonobstructing small renal stones. Labs were negative for infection or renal injury but noted elevated LFT's. She was sent home and recommended to follow up with PCP, which was accomplished 10/24/22. She has referral pending to GI for suspected choledocholithiasis.    She is asymptomatic at present. She denies symptoms of fever, chills, flank pain, nausea, vomiting, urinary frequency and dysuria.     CT scan from 9/3/22 is personally reviewed and demonstrates a 1 mm right lower pole renal stone and 3 mm left lower pole renal stone.    Significant labs from presentation include mild hematuria, no pyuria, negative nitrite, few bacteria, normal WBC, normal creatinine and normal potassium.    ROS   A 12 point comprehensive review of systems is negative except for HPI    Past Medical History:   Diagnosis Date     Anemia     PREGNANCY     Herpes simplex without mention of complication      HSV      Hypertension     Gestational     PONV (postoperative nausea and vomiting)     POST C  SECTION     Pre-eclampsia      Past Surgical History:   Procedure Laterality Date     C BREAST PROSTHESIS, NOS  2004      SECTION       HERNIORRHAPHY VENTRAL  10/26/2012    Procedure: HERNIORRHAPHY VENTRAL;  Open Ventral Hernia Repair  ;  Surgeon: Shaun Clinton MD;  Location: [facility] OR     LAPAROSCOPIC CHOLECYSTECTOMY N/A 2018    Procedure: LAPAROSCOPIC CHOLECYSTECTOMY;  LAPAROSCOPIC CHOLECYSTECTOMY .;  Surgeon: Brandon Reagan MD;  Location: [facility] OR     LAPAROSCOPY DIAGNOSTIC (GYN) Bilateral 2016    Procedure: LAPAROSCOPY DIAGNOSTIC (GYN);  Surgeon: Ventura Flower MD;  Location: [facility]     LAPAROTOMY MINI, TUBAL LIGATION (POST PARTUM), COMBINED  2012    Procedure:COMBINED LAPAROTOMY MINI, TUBAL LIGATION (POST PARTUM); Surgeon:RAVEN YARBROUGH; Location:[facility]     TUBOPLASTY REANASTOMOSIS Bilateral 2016    Procedure: TUBOPLASTY REANASTOMOSIS;  Surgeon: Ventura Flower MD;  Location: [facility]       Current Outpatient Medications   Medication Sig Dispense Refill     ondansetron (ZOFRAN ODT) 4 MG ODT tab Take 1 tablet (4 mg) by mouth every 8 hours as needed 15 tablet 1     oxyCODONE (ROXICODONE) 5 MG tablet Take 1 tablet (5 mg) by mouth every 6 hours as needed for pain 12 tablet 0     Calcium Carbonate-Vitamin D (CALCIUM + D PO) Take 1 tablet by mouth daily.       clobetasol (TEMOVATE) 0.05 % external solution Apply to affected areas on scalp 2x/day for 14 days 50 mL 1     estradiol (CLIMARA) 0.0375 MG/24HR weekly patch Place 1 patch onto the skin once a week 12 patch 4     multivitamin w/minerals (THERA-VIT-M) tablet Take 1 tablet by mouth daily       progesterone (PROMETRIUM) 100 MG capsule Take 1 capsule (100 mg) by mouth daily 90 capsule 4     valACYclovir (VALTREX) 1000 mg tablet Take 1 tablet (1,000 mg) by mouth 3 times daily for 7 days 21 tablet 0       No Known Allergies    Social History     Socioeconomic History     Marital status:      Spouse name: Not on  file     Number of children: Not on file     Years of education: Not on file     Highest education level: Not on file   Occupational History     Not on file   Tobacco Use     Smoking status: Former     Packs/day: 1.00     Years: 15.00     Pack years: 15.00     Types: Cigarettes     Quit date: 2000     Years since quittin.8     Smokeless tobacco: Never   Substance and Sexual Activity     Alcohol use: No     Alcohol/week: 0.0 standard drinks     Comment: occas     Drug use: No     Sexual activity: Yes     Partners: Male     Birth control/protection: Female Surgical     Comment: tubal ligation   Other Topics Concern     Parent/sibling w/ CABG, MI or angioplasty before 65F 55M? Not Asked   Social History Narrative     Not on file     Social Determinants of Health     Financial Resource Strain: Not on file   Food Insecurity: Not on file   Transportation Needs: Not on file   Physical Activity: Not on file   Stress: Not on file   Social Connections: Not on file   Intimate Partner Violence: Not on file   Housing Stability: Not on file       Family History   Problem Relation Age of Onset     Hypertension Father      Uterine Cancer Sister         leiomyosarcoma     Hypertension Brother      Cerebrovascular Disease Maternal Grandmother 50     Macular Degeneration Paternal Grandmother      Eye Disorder Other         Great aunt, retinitis pigmentosa     Asthma No family hx of      C.A.D. No family hx of      Diabetes No family hx of      Breast Cancer No family hx of      Cancer - colorectal No family hx of      Prostate Cancer No family hx of      Glaucoma No family hx of        Objective:     No vitals or physical exam obtained due to virtual visit    LABS  Most Recent 3 CBC's:  Recent Labs   Lab Test 22  2344 21  1055 19  1022   WBC 6.3 5.1 5.9   HGB 13.4 14.8 13.2   MCV 88 89 86    259 253     Most Recent 3 BMP's:  Recent Labs   Lab Test 22  2344 21  1055 19  1022     139 139   POTASSIUM 3.5 4.3 3.7   CHLORIDE 104 106 104   CO2 24 30 28   BUN 13 17 13   CR 0.65 0.59 0.59   ANIONGAP 11 3 7   ALEX 9.7 9.3 9.0    82 81     Most Recent Urinalysis:  Recent Labs   Lab Test 09/02/22  2344   COLOR Light Yellow   APPEARANCE Clear   URINEGLC Negative   URINEBILI Negative   URINEKETONE Negative   SG 1.013   UBLD 0.06 mg/dL*   URINEPH 5.5   PROTEIN Negative   NITRITE Negative   LEUKEST Negative   RBCU 2   WBCU 2

## 2022-11-03 NOTE — TELEPHONE ENCOUNTER
DIAGNOSIS: Hip pain, right [M25.551] / Blanca Heard MD in [facility] IN WOMEN IM / PREFERREDONE   APPOINTMENT DATE: 11.14.22   NOTES STATUS DETAILS   OFFICE NOTE from referring provider Internal 10.24.22 [place]   OFFICE NOTE from other specialist Internal 9.16.22 [place]   MEDICATION LIST Internal    XRAYS (IMAGES & REPORTS) Internal 9.16.22 R hip

## 2022-11-04 ENCOUNTER — HOSPITAL ENCOUNTER (OUTPATIENT)
Dept: MRI IMAGING | Facility: HOSPITAL | Age: 52
Discharge: HOME | End: 2022-11-04
Attending: INTERNAL MEDICINE | Admitting: INTERNAL MEDICINE
Payer: COMMERCIAL

## 2022-11-04 ENCOUNTER — TELEPHONE (OUTPATIENT)
Dept: OBGYN | Facility: CLINIC | Age: 52
End: 2022-11-04

## 2022-11-04 DIAGNOSIS — R10.11 RUQ ABDOMINAL PAIN: ICD-10-CM

## 2022-11-04 DIAGNOSIS — F40.240 CLAUSTROPHOBIA: Primary | ICD-10-CM

## 2022-11-04 PROCEDURE — 255N000002 HC RX 255 OP 636: Performed by: INTERNAL MEDICINE

## 2022-11-04 PROCEDURE — 74183 MRI ABD W/O CNTR FLWD CNTR: CPT

## 2022-11-04 PROCEDURE — A9585 GADOBUTROL INJECTION: HCPCS | Performed by: INTERNAL MEDICINE

## 2022-11-04 RX ORDER — LORAZEPAM 0.5 MG/1
0.5 TABLET ORAL
Qty: 1 TABLET | Refills: 0 | Status: SHIPPED | OUTPATIENT
Start: 2022-11-04 | End: 2023-03-21

## 2022-11-04 RX ORDER — GADOBUTROL 604.72 MG/ML
7 INJECTION INTRAVENOUS ONCE
Status: COMPLETED | OUTPATIENT
Start: 2022-11-04 | End: 2022-11-04

## 2022-11-04 RX ADMIN — GADOBUTROL 7 ML: 604.72 INJECTION INTRAVENOUS at 16:27

## 2022-11-04 NOTE — TELEPHONE ENCOUNTER
Dr. Heard just sent over medication for this patient to her [facility] pharmacy.    Patient called and notified.

## 2022-11-04 NOTE — TELEPHONE ENCOUNTER
Patient left a voicemail stating that Dr. Heard wanted her to have an MRI which she has scheduled for today and Dr. Heard was supposed to call in a light sedative for her to take 30 minutes prior to her MRI. Edwina stated Dr. Heard knows that she is claustrophobic and cannot do this MRI without it.    Will route a message off to Dr. Heard.

## 2022-11-09 ENCOUNTER — TELEPHONE (OUTPATIENT)
Dept: GASTROENTEROLOGY | Facility: CLINIC | Age: 52
End: 2022-11-09

## 2022-11-09 DIAGNOSIS — R79.89 ELEVATED LFTS: Primary | ICD-10-CM

## 2022-11-09 NOTE — TELEPHONE ENCOUNTER
[facility]     Referring provider: Blanca Heard MD    Referred to: [facility]     Provider Requested: NA     Referral Received: 10/25, MRCP 11-4      Records received:  EPIC     Images received: PACS    Evaluation for: R10.11 (ICD-10-CM) - RUQ abdominal pain, LFT elevation x1 post ben- SOD?     Clinical History (per RN review):     Pt w/ hx of RUQ Pain and elevated LFTs in September 2022. Choledocholithiasis suspected. MRCP with no stones on 11/4/22. LFTs has since returned to normal.     First time for pain since cholecystomy in 2018- concern for SOD?       Latest Reference Range & Units 09/02/22 23:44 10/24/22 14:57   ALT 0 - 50 U/L 187 (H) 18   AST 0 - 45 U/L 448 (H) 15   Bilirubin Direct 0.0 - 0.2 mg/dL  0.1   Bilirubin Total 0.2 - 1.3 mg/dL 0.4 0.3   (H): Data is abnormally high        MRCP 11-4-22                                                   IMPRESSION:     1.  No acute abnormality or specific cause of abdominal pain identified.     2.  Mild extrahepatic biliary ductal dilation, likely related to postcholecystectomy reservoir effect. No intrahepatic biliary ductal dilation, choledocholiths, or biliary strictures.     3.  Small broad periumbilical hernia containing omental fat and a nonobstructed loop of small bowel, unchanged.      MD review date: 11/09/22      MD Decision for clinic consultation/Orders:            Referral updates/Patient contacted:

## 2022-11-10 NOTE — TELEPHONE ENCOUNTER
Dr. Hall reviewed and sent to Dr. Escobedo for review:  Please schedule EUS with me in [facility] and we can take it from there     Called to review plan with patient, she's agreeable to schedule. Orders place for [facility]    ML

## 2022-11-11 ENCOUNTER — HOSPITAL ENCOUNTER (OUTPATIENT)
Facility: CLINIC | Age: 52
End: 2022-11-11
Attending: INTERNAL MEDICINE | Admitting: INTERNAL MEDICINE
Payer: COMMERCIAL

## 2022-11-11 ENCOUNTER — TELEPHONE (OUTPATIENT)
Dept: GASTROENTEROLOGY | Facility: CLINIC | Age: 52
End: 2022-11-11

## 2022-11-11 NOTE — TELEPHONE ENCOUNTER
Screening Questions    BlueKIND OF PREP RedLOCATION [review exclusion criteria] GreenSEDATION TYPE    N Have you had a positive covid test in the last 90 days?   If yes, what date?      Y Your able to give consent for your medical care?    Y Are you active on mychart?     PO What insurance do you carry?      GURU SAMY COSTA Ordering/Referring Provider:     27.4 BMI [BMI OVER 40-EXTENDED PREP]  BMI OVER 40 NEED PAC EVALUATION FOR [facility]    Respiratory Screening  [If yes to any of the following HOSPITAL setting only]     N      Do you use daily home oxygen?   N      Do you have mod to severe Obstructive Sleep Apnea?  N      Do you have Pulmonary Hypertension? NEED PAC APPT AT [facility]    N      Do you have UNCONTROLLED asthma?      N Have you had a heart or lung transplant?       N Are you currently on dialysis? [If yes, G-PREP & HOSPITAL setting only]   N  Do you have chronic kidney disease? [If yes, G-PREP]  N  Do you have a diagnosis of diabetes?[If yes, G-PREP]    N Have you had a stroke or Transient ischemic attack (TIA - aka  mini stroke ) within 6 months? (If yes, please review exclusion criteria)  N  In the past 6 months, have you had any heart related issues including cardiomyopathy or heart attack?   N  If yes, did it require cardiac stenting or other implantable device?       N Do you have any implantable devices in your body (pacemaker, defib, LVAD)? (If yes, please review exclusion criteria)    N Do you take nitroglycerin?   N If yes, how often?  (If yes, HOSPITAL setting ONLY)  N Are you currently taking any blood thinners?           [IF YES, INFORM PATIENT TO FOLLOW UP W/ ORDERING PROVIDER FOR BRIDGING INSTRUCTIONS]     N  Do you take Phentermine?   Yes-> Hold for 7 days before procedure.  Please consult your prescribing provider if you have questions about holding this medication.         [FEMALES] are you  currently pregnant?       If yes, how many weeks? [Greater than 12 weeks, OR NEEDED]    N Any prescription pain medications taken daily like narcotics? [EXTENDED PREP AND MAC]    N Any chemical dependencies such as alcohol, street drugs, or methadone? [If yes, MAC]    N Any post-traumatic stress syndrome, severe anxiety or history of psychosis? [If yes, MAC]    Y Can you transfer from bed to chair? (If NO, please HOSPITAL setting  only)       On a regular basis do you go 3-5 days without a bowel movement?[ If yes, EXTENDED PREP.]     Preferred LOCAL Pharmacy for Pre Prescription:         [id number] [place], [place] - [address]                          Scheduling Details    Edwina Caller:   (Please ask for phone number if not scheduled by patient)    Type of Procedure Scheduled: ENDOSCOPIC ULTRASOUND [EUS]     Which Colonoscopy Prep was Sent:   EMANI CF PATIENTS & GROEN'S PATIENTS NEEDS EXTENDED PREP    2/7 Date of Procedure:    Surgeon:   [facility] Location:     [facility] Sedation Type:     Conscious Sedation- Needs  for 6 hours after the procedure  MAC/General-Needs  for 24 hours after procedure    Y Pre-op Required at [facility] [place], [place] and OR for MAC sedation:        (Advise patient they will need a pre-op WITH IN 30 DAYS prior to procedure -)      Y Informed patient they will need an adult          Cannot take any type of public or medical transportation alone    Y Confirmed Nurse will call to complete assessment     HOME Pre-Procedure Covid test to be completed [[facility] PCR Testing Required]    DOUBLE CHECK BMI AND OMID       Additional comments:

## 2022-11-14 ENCOUNTER — OFFICE VISIT (OUTPATIENT)
Dept: ORTHOPEDICS | Facility: CLINIC | Age: 52
End: 2022-11-14
Payer: COMMERCIAL

## 2022-11-14 ENCOUNTER — PRE VISIT (OUTPATIENT)
Dept: ORTHOPEDICS | Facility: CLINIC | Age: 52
End: 2022-11-14

## 2022-11-14 DIAGNOSIS — M67.951 TENDINOPATHY OF RIGHT GLUTEUS MEDIUS: Primary | ICD-10-CM

## 2022-11-14 PROCEDURE — 99203 OFFICE O/P NEW LOW 30 MIN: CPT | Performed by: FAMILY MEDICINE

## 2022-11-14 NOTE — LETTER
11/14/2022      RE: Edwina Wynne  [address]  [facility] [address]     Dear Colleague,    Thank you for referring your patient, Edwina Wynne, to the [facility] [place]. Please see a copy of my visit note below.    Sports Medicine Clinic Visit    PCP: Blanca Heard    Edwina Wynne is a 52 year old female who is seen  in consultation at the request of Dr. Heard presenting with right hip pain.  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  Bothersome with rolling on hip in bed at night.  Bothersome with moving to stand from a seated position.  She does not spend time at a desk or computer.  No history of injury.  No radicular leg pain or numbness or tingling.    Injury: No injury    Location of Pain: right lateral hip  Duration of Pain: 4 month(s)  Rating of Pain: 2/10  Pain is better with: heat, stretching  Pain is worse with: sitting for long periods, crossing legs  Additional Features: tenderness   Treatment so far consists of: Heat  Prior History of related problems: None    LMP  (LMP Unknown)           X-rays of right hip 9/16/2022, images reviewed by me, mild degenerative change right hip          EXAM: XR HIP RIGHT 2-3 VIEWS  LOCATION: [facility]  DATE/TIME: 9/16/2022 11:58 AM     INDICATION: right hip pain for 3 4 weeks  COMPARISON: None.                                                                      IMPRESSION: No fracture. Mild degenerative changes in the right hip.    PMH:  Past Medical History:   Diagnosis Date     Anemia     PREGNANCY     Herpes simplex without mention of complication      HSV      Hypertension     Gestational     PONV (postoperative nausea and vomiting)     POST C SECTION     Pre-eclampsia        Active problem list:  Patient Active Problem List   Diagnosis     Tubal ligation status     Ventral hernia, recurrent     Essential hypertension,  benign     Tubal occlusion     Choledocholithiasis with acute cholecystitis     Annual physical exam     Menopausal syndrome (hot flashes)     Allergic rhinitis     Dyslipidemia     Genital herpes     Palpitations       FH:  Family History   Problem Relation Age of Onset     Hypertension Father      Uterine Cancer Sister         leiomyosarcoma     Hypertension Brother      Cerebrovascular Disease Maternal Grandmother 50     Macular Degeneration Paternal Grandmother      Eye Disorder Other         Great aunt, retinitis pigmentosa     Asthma No family hx of      C.A.D. No family hx of      Diabetes No family hx of      Breast Cancer No family hx of      Cancer - colorectal No family hx of      Prostate Cancer No family hx of      Glaucoma No family hx of        SH:  Social History     Socioeconomic History     Marital status:      Spouse name: Not on file     Number of children: Not on file     Years of education: Not on file     Highest education level: Not on file   Occupational History     Not on file   Tobacco Use     Smoking status: Former     Packs/day: 1.00     Years: 15.00     Pack years: 15.00     Types: Cigarettes     Quit date: 2000     Years since quittin.8     Smokeless tobacco: Never   Substance and Sexual Activity     Alcohol use: No     Alcohol/week: 0.0 standard drinks     Comment: occas     Drug use: No     Sexual activity: Yes     Partners: Male     Birth control/protection: Female Surgical     Comment: tubal ligation   Other Topics Concern     Parent/sibling w/ CABG, MI or angioplasty before 65F 55M? Not Asked   Social History Narrative     Not on file     Social Determinants of Health     Financial Resource Strain: Not on file   Food Insecurity: Not on file   Transportation Needs: Not on file   Physical Activity: Not on file   Stress: Not on file   Social Connections: Not on file   Intimate Partner Violence: Not on file   Housing Stability: Not on file       MEDS:  See EMR,  reviewed  ALL:  See EMR, reviewed    REVIEW OF SYSTEMS:  CONSTITUTIONAL:NEGATIVE for fever, chills, change in weight  INTEGUMENTARY/SKIN: NEGATIVE for worrisome rashes, moles or lesions  EYES: NEGATIVE for vision changes or irritation  ENT/MOUTH: NEGATIVE for ear, mouth and throat problems  RESP:NEGATIVE for significant cough or SOB  BREAST: NEGATIVE for masses, tenderness or discharge  CV: NEGATIVE for chest pain, palpitations or peripheral edema  GI: NEGATIVE for nausea, abdominal pain, heartburn, or change in bowel habits  :NEGATIVE for frequency, dysuria, or hematuria  :NEGATIVE for frequency, dysuria, or hematuria  NEURO: NEGATIVE for weakness, dizziness or paresthesias  ENDOCRINE: NEGATIVE for temperature intolerance, skin/hair changes  HEME/ALLERGY/IMMUNE: NEGATIVE for bleeding problems  PSYCHIATRIC: NEGATIVE for changes in mood or affect        Objective: Normal range of motion at the right hip to internal rotation, external rotation and abduction.  Layne and FADIR tests negative.  Tender over the gluteus medius attachment at the greater trochanter on the right.  Nontender over the distal IT band.  Nontender over the pelvic brim.  Lulu test is negative.  Strength is intact at hip, knee, ankle and foot.  Sensation is normal distally.  Appropriate conversation and affect.    Assessment gluteus medius tendinitis, right hip    Plan: She understands that cortisone shots do not cause this to heal although they can be used for pain relief.  They are not recommended to be done multiple times in the soft tissues for the hip.  She would like to avoid cortisone shots at this time.  She knows that nonsteroidal anti-inflammatories do not cause tendons to heal.  She would like to see physical therapy for an alignment program.  We discussed ergonomics of the seated position.  She will follow-up if not improved.        Again, thank you for allowing me to participate in the care of your patient.       Sincerely,    Willie Lyn MD

## 2022-11-14 NOTE — PROGRESS NOTES
Sports Medicine Clinic Visit    PCP: Blanca Heard    Edwina Wynne is a 52 year old female who is seen  in consultation at the request of Dr. Heard presenting with right hip pain.  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  Bothersome with rolling on hip in bed at night.  Bothersome with moving to stand from a seated position.  She does not spend time at a desk or computer.  No history of injury.  No radicular leg pain or numbness or tingling.    Injury: No injury    Location of Pain: right lateral hip  Duration of Pain: 4 month(s)  Rating of Pain: 2/10  Pain is better with: heat, stretching  Pain is worse with: sitting for long periods, crossing legs  Additional Features: tenderness   Treatment so far consists of: Heat  Prior History of related problems: None    LMP  (LMP Unknown)           X-rays of right hip 9/16/2022, images reviewed by me, mild degenerative change right hip          EXAM: XR HIP RIGHT 2-3 VIEWS  LOCATION: [facility]  DATE/TIME: 9/16/2022 11:58 AM     INDICATION: right hip pain for 3 4 weeks  COMPARISON: None.                                                                      IMPRESSION: No fracture. Mild degenerative changes in the right hip.    PMH:  Past Medical History:   Diagnosis Date     Anemia     PREGNANCY     Herpes simplex without mention of complication      HSV      Hypertension     Gestational     PONV (postoperative nausea and vomiting)     POST C SECTION     Pre-eclampsia        Active problem list:  Patient Active Problem List   Diagnosis     Tubal ligation status     Ventral hernia, recurrent     Essential hypertension, benign     Tubal occlusion     Choledocholithiasis with acute cholecystitis     Annual physical exam     Menopausal syndrome (hot flashes)     Allergic rhinitis     Dyslipidemia     Genital herpes     Palpitations       FH:  Family History   Problem Relation Age  of Onset     Hypertension Father      Uterine Cancer Sister         leiomyosarcoma     Hypertension Brother      Cerebrovascular Disease Maternal Grandmother 50     Macular Degeneration Paternal Grandmother      Eye Disorder Other         Great aunt, retinitis pigmentosa     Asthma No family hx of      C.A.D. No family hx of      Diabetes No family hx of      Breast Cancer No family hx of      Cancer - colorectal No family hx of      Prostate Cancer No family hx of      Glaucoma No family hx of        SH:  Social History     Socioeconomic History     Marital status:      Spouse name: Not on file     Number of children: Not on file     Years of education: Not on file     Highest education level: Not on file   Occupational History     Not on file   Tobacco Use     Smoking status: Former     Packs/day: 1.00     Years: 15.00     Pack years: 15.00     Types: Cigarettes     Quit date: 2000     Years since quittin.8     Smokeless tobacco: Never   Substance and Sexual Activity     Alcohol use: No     Alcohol/week: 0.0 standard drinks     Comment: occas     Drug use: No     Sexual activity: Yes     Partners: Male     Birth control/protection: Female Surgical     Comment: tubal ligation   Other Topics Concern     Parent/sibling w/ CABG, MI or angioplasty before 65F 55M? Not Asked   Social History Narrative     Not on file     Social Determinants of Health     Financial Resource Strain: Not on file   Food Insecurity: Not on file   Transportation Needs: Not on file   Physical Activity: Not on file   Stress: Not on file   Social Connections: Not on file   Intimate Partner Violence: Not on file   Housing Stability: Not on file       MEDS:  See EMR, reviewed  ALL:  See EMR, reviewed    REVIEW OF SYSTEMS:  CONSTITUTIONAL:NEGATIVE for fever, chills, change in weight  INTEGUMENTARY/SKIN: NEGATIVE for worrisome rashes, moles or lesions  EYES: NEGATIVE for vision changes or irritation  ENT/MOUTH: NEGATIVE for ear, mouth  and throat problems  RESP:NEGATIVE for significant cough or SOB  BREAST: NEGATIVE for masses, tenderness or discharge  CV: NEGATIVE for chest pain, palpitations or peripheral edema  GI: NEGATIVE for nausea, abdominal pain, heartburn, or change in bowel habits  :NEGATIVE for frequency, dysuria, or hematuria  :NEGATIVE for frequency, dysuria, or hematuria  NEURO: NEGATIVE for weakness, dizziness or paresthesias  ENDOCRINE: NEGATIVE for temperature intolerance, skin/hair changes  HEME/ALLERGY/IMMUNE: NEGATIVE for bleeding problems  PSYCHIATRIC: NEGATIVE for changes in mood or affect        Objective: Normal range of motion at the right hip to internal rotation, external rotation and abduction.  Layne and FADIR tests negative.  Tender over the gluteus medius attachment at the greater trochanter on the right.  Nontender over the distal IT band.  Nontender over the pelvic brim.  Lulu test is negative.  Strength is intact at hip, knee, ankle and foot.  Sensation is normal distally.  Appropriate conversation and affect.    Assessment gluteus medius tendinitis, right hip    Plan: She understands that cortisone shots do not cause this to heal although they can be used for pain relief.  They are not recommended to be done multiple times in the soft tissues for the hip.  She would like to avoid cortisone shots at this time.  She knows that nonsteroidal anti-inflammatories do not cause tendons to heal.  She would like to see physical therapy for an alignment program.  We discussed ergonomics of the seated position.  She will follow-up if not improved.

## 2022-12-08 ENCOUNTER — TELEPHONE (OUTPATIENT)
Dept: OBGYN | Facility: CLINIC | Age: 52
End: 2022-12-08

## 2022-12-08 NOTE — TELEPHONE ENCOUNTER
Patient calling with questions regarding blood pressures. Reports that she was previously on blood pressure medication but stopped about two years ago. She reports her blood pressures were fine for about a year but have been elevated recently. Reports her home BP was 136/91 while on the phone, and they were elevated at her visit on 10/24. She denies chest pain, difficulty breathing. Endorses headaches and intermittent blurry vision. Wondering if she should be getting back on blood pressure medication. Let patient know we will send message to Dr. Heard and reach back out to her with next steps. Encouraged patient to seek emergency care if experiencing CP, SOB, severe pain or vision changes. Patient verbalized understanding.

## 2022-12-09 ENCOUNTER — OFFICE VISIT (OUTPATIENT)
Dept: FAMILY MEDICINE | Facility: CLINIC | Age: 52
End: 2022-12-09
Attending: FAMILY MEDICINE
Payer: COMMERCIAL

## 2022-12-09 VITALS
SYSTOLIC BLOOD PRESSURE: 142 MMHG | BODY MASS INDEX: 27.92 KG/M2 | HEIGHT: 66 IN | HEART RATE: 82 BPM | WEIGHT: 173.7 LBS | DIASTOLIC BLOOD PRESSURE: 88 MMHG

## 2022-12-09 DIAGNOSIS — F41.8 SITUATIONAL ANXIETY: ICD-10-CM

## 2022-12-09 DIAGNOSIS — I10 ESSENTIAL HYPERTENSION, BENIGN: Primary | ICD-10-CM

## 2022-12-09 PROCEDURE — G0463 HOSPITAL OUTPT CLINIC VISIT: HCPCS

## 2022-12-09 PROCEDURE — 99214 OFFICE O/P EST MOD 30 MIN: CPT | Performed by: FAMILY MEDICINE

## 2022-12-09 RX ORDER — HYDROXYZINE HYDROCHLORIDE 25 MG/1
25 TABLET, FILM COATED ORAL 3 TIMES DAILY PRN
Qty: 30 TABLET | Refills: 0 | Status: SHIPPED | OUTPATIENT
Start: 2022-12-09 | End: 2024-04-18

## 2022-12-09 RX ORDER — LISINOPRIL 10 MG/1
10 TABLET ORAL DAILY
Qty: 30 TABLET | Refills: 1 | Status: SHIPPED | OUTPATIENT
Start: 2022-12-09 | End: 2023-03-21

## 2022-12-09 NOTE — TELEPHONE ENCOUNTER
"Called patient to pass on Dr. Heard's message:    \"Recommend follow up in clinic for blood pressure check. She can see me or Dr. Ribeiro if she has availability.\"    Scheduled patient with Dr. Ribeiro today, 12/9. Patient agreeable.   "

## 2022-12-09 NOTE — PROGRESS NOTES
"CC: Consult (Blood Pressure evaluation )      SUBJECTIVE:  Edwina Wynne presents for follow-up of hypertension.     Dx with gestational HTN 10 years ago, started on atenolol. About 3 years ago was able to wean off. But over the past few months they have been higher -- she has noticed b/c she has been getting headaches and will check her BP at home and high 130s-140s/80s-90s.     Otherwise, she is feeling well and denies any symptoms of blurry vision, ringing or buzzing in ears, chest pain, cough, SOB, orthopnea, PND or peripheral edema. There has not been any syncope, presyncope, or symptoms suggestive of TIA or stroke or lower extremity claudication. Does have occasional palpitations but this is a long-standing intermittent issue.     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 Edwina hasn't been eating as well recently and exercising less due to the stressors. Edwina wonders about an as-needed anxiety medication -- she can hold it together when she is with her sister, but when she gets home she will get panicked and have a hard time sleeping, lots of anxiety. Has been on an as-needed in the past.     Tobacco cessation: nonsmoker  Alcohol use: occasional       OBJECTIVE:  BP (!) 142/88   Pulse 82   Ht 1.676 m (5' 6\")   Wt 78.8 kg (173 lb 11.2 oz)   LMP  (LMP Unknown)   BMI 28.04 kg/m    General: healthy, alert, and in no distress.  HEENT: PERRL, EOMI, canals and TMs normal. Neck supple.   Lungs: CTAB.  Heart: RRR. No murmur.  Extremities: Warm. Well-perfused. No cyanosis or edema.  Psych: Mood and affect appropriate, appropriately tearful at times.     Last Comprehensive Metabolic Panel:  Sodium   Date Value Ref Range Status   09/02/2022 139 136 - 145 mmol/L Final   02/12/2021 139 133 - 144 mmol/L Final     Potassium   Date Value Ref Range Status   09/02/2022 3.5 3.5 - 5.0 mmol/L Final   02/12/2021 4.3 3.4 - 5.3 mmol/L Final "     Chloride   Date Value Ref Range Status   09/02/2022 104 98 - 107 mmol/L Final   02/12/2021 106 94 - 109 mmol/L Final     Carbon Dioxide   Date Value Ref Range Status   02/12/2021 30 20 - 32 mmol/L Final     Carbon Dioxide (CO2)   Date Value Ref Range Status   09/02/2022 24 22 - 31 mmol/L Final     Anion Gap   Date Value Ref Range Status   09/02/2022 11 5 - 18 mmol/L Final   02/12/2021 3 3 - 14 mmol/L Final     Glucose   Date Value Ref Range Status   09/02/2022 101 70 - 125 mg/dL Final   02/12/2021 82 70 - 99 mg/dL Final     Urea Nitrogen   Date Value Ref Range Status   09/02/2022 13 8 - 22 mg/dL Final   02/12/2021 17 7 - 30 mg/dL Final     Creatinine   Date Value Ref Range Status   09/02/2022 0.65 0.60 - 1.10 mg/dL Final   02/12/2021 0.59 0.52 - 1.04 mg/dL Final     GFR Estimate   Date Value Ref Range Status   09/02/2022 >90 >60 mL/min/1.73m2 Final     Comment:     Effective December 21, 2021 eGFRcr in adults is calculated using the 2021 CKD-EPI creatinine equation which includes age and gender (Chanda et al., NE, DOI: 10.1056/HUDEch9456772)   02/12/2021 >90 >60 mL/min/[1.73_m2] Final     Comment:     Non  GFR Calc  Starting 12/18/2018, serum creatinine based estimated GFR (eGFR) will be   calculated using the Chronic Kidney Disease Epidemiology Collaboration   (CKD-EPI) equation.       Calcium   Date Value Ref Range Status   09/02/2022 9.7 8.5 - 10.5 mg/dL Final   02/12/2021 9.3 8.5 - 10.1 mg/dL Final         ASSESSMENT/PLAN:  HTN, inadequate control. Start lisinopril 10 mg daily. I discussed with the patient the risks, benefits, and alternatives to taking this medication as well as common side effects. Check BMP in 2 weeks after starting lisinopril to monitor renal fxn & electrolytes, baseline BMP from 9/2022 reviewed. Continue to check BP at home.     Anxiety, situational. Can use hydroxyzine 25 mg TID prn for anxiety/sleep. I discussed with the patient the risks, benefits, and alternatives  to taking this medication as well as common side effects. She thinks she has been on hydroxyzine in the past.     Worrisome signs and symptoms were discussed with Edwina and she was instructed to return to the clinic for concerning symptoms or to call with questions.    Follow-up in 1 month.      Tootie Ribeiro MD  Family Medicine

## 2023-01-27 ENCOUNTER — TELEPHONE (OUTPATIENT)
Dept: GASTROENTEROLOGY | Facility: CLINIC | Age: 53
End: 2023-01-27

## 2023-01-27 NOTE — TELEPHONE ENCOUNTER
Attempted to contact patient regarding upcoming Endoscopic ultrasound (EUS) procedure on 2/7/23 for pre assessment questions. Pt answered and stated she was driving and will need to call back.    Instructed pt to return call to [phone] #4    Discuss Covid policy and designated  policy.    Pre op exam scheduled: N/A    Arrival time: 0730. Procedure time: 0900    Facility location: [facility]; [address] [place] [address], [address], [place] [id number]    Sedation type: MAC    Anticoagulants: No    Electronic implanted devices? No    Diabetic? No    Indication for procedure: Elevated LFTs with pain    Prep instructions sent via SMRxT.        Maggie Paul, HAM  Endoscopy Procedure Pre Assessment RN

## 2023-01-31 ENCOUNTER — TELEPHONE (OUTPATIENT)
Dept: GASTROENTEROLOGY | Facility: CLINIC | Age: 53
End: 2023-01-31
Payer: COMMERCIAL

## 2023-01-31 NOTE — TELEPHONE ENCOUNTER
Caller: Edwina Wynne   Reason for Reschedule/Cancellation (please be detailed, any staff messages or encounters to note?):     Per pt -- cancel no r/s       Prior to reschedule please review:    Ordering Provider:Guru Omi Escobedo MD     Sedation per order:MODERATE     Does patient have any ASC Exclusions, please identify?:     Y - EUS WITH        Notes on Cancelled Procedure:  1. Procedure:ENDOSCOPIC ULTRASOUND [EUS]   2. Date: 02/07/2023  3. Location:[facility]; [address] [place] [address], [address] Floor, [place] [address]  4. Surgeon:          Rescheduled: NO

## 2023-01-31 NOTE — TELEPHONE ENCOUNTER
Attempted to contact patient regarding upcoming Endoscopic ultrasound (EUS) procedure on 2/7/23 for pre assessment questions. Patient states she will need to cancel. Sent to schedulers to cancel.       Marisela Crandall RN  Endoscopy Procedure Pre Assessment RN

## 2023-03-21 ENCOUNTER — OFFICE VISIT (OUTPATIENT)
Dept: FAMILY MEDICINE | Facility: CLINIC | Age: 53
End: 2023-03-21
Payer: COMMERCIAL

## 2023-03-21 VITALS
DIASTOLIC BLOOD PRESSURE: 92 MMHG | SYSTOLIC BLOOD PRESSURE: 150 MMHG | HEART RATE: 82 BPM | BODY MASS INDEX: 27.67 KG/M2 | HEIGHT: 66 IN | OXYGEN SATURATION: 98 % | WEIGHT: 172.19 LBS

## 2023-03-21 DIAGNOSIS — M25.511 RIGHT SHOULDER PAIN, UNSPECIFIED CHRONICITY: ICD-10-CM

## 2023-03-21 DIAGNOSIS — I10 ESSENTIAL HYPERTENSION, BENIGN: ICD-10-CM

## 2023-03-21 DIAGNOSIS — M54.9 UPPER BACK PAIN ON RIGHT SIDE: ICD-10-CM

## 2023-03-21 DIAGNOSIS — M54.2 NECK PAIN ON RIGHT SIDE: Primary | ICD-10-CM

## 2023-03-21 PROCEDURE — 99214 OFFICE O/P EST MOD 30 MIN: CPT | Performed by: PHYSICIAN ASSISTANT

## 2023-03-21 RX ORDER — CYCLOBENZAPRINE HCL 5 MG
5 TABLET ORAL
Qty: 30 TABLET | Refills: 0 | Status: SHIPPED | OUTPATIENT
Start: 2023-03-21 | End: 2024-04-18

## 2023-03-21 RX ORDER — LOSARTAN POTASSIUM 50 MG/1
50 TABLET ORAL DAILY
Qty: 90 TABLET | Refills: 1 | Status: SHIPPED | OUTPATIENT
Start: 2023-03-21 | End: 2023-11-03

## 2023-03-21 NOTE — PATIENT INSTRUCTIONS
Increase losartan to 50 mg daily,   Follow up in 3 weeks if your blood pressures at home are in the 130s or higher.  This can be done by video visit or medical message

## 2023-03-21 NOTE — PROGRESS NOTES
"  Assessment & Plan   Problem List Items Addressed This Visit        Circulatory    Essential hypertension, benign     Noted cough with lisinopril, changed to losartan.  Losartan increased to 50 mg 3/21/2023  Follow up 2-3 weeks for bp recheck           Relevant Medications    losartan (COZAAR) 50 MG tablet   Other Visit Diagnoses     Neck pain on right side    -  Primary    Relevant Medications    cyclobenzaprine (FLEXERIL) 5 MG tablet    Other Relevant Orders    Physical Therapy Referral    Right shoulder pain, unspecified chronicity        Relevant Orders    Physical Therapy Referral    Upper back pain on right side        Relevant Medications    cyclobenzaprine (FLEXERIL) 5 MG tablet    Other Relevant Orders    Physical Therapy Referral                    BMI:   Estimated body mass index is 27.79 kg/m  as calculated from the following:    Height as of this encounter: 1.676 m (5' 6\").    Weight as of this encounter: 78.1 kg (172 lb 3 oz).         Return in about 4 weeks (around 4/18/2023) for Follow up.    JOANA [facility]   Edwina is a 52 year old, presenting for the following health issues:  Musculoskeletal Problem (Neck/shoulder pain and headaches for over 6 months now, primarily right side. Pain will shoot up the side of her head and down her right arm. No relief from otc meds or home remedies. )      History of Present Illness       Back Pain:  She presents for follow up of back pain. Patient's back pain is a new problem.    Original cause of back pain: not sure  First noticed back pain: more than 1 month ago  Patient feels back pain: constantlyLocation of back pain:  Right upper back, right side of neck and right shoulder  Description of back pain: sharp, shooting and stabbing  Back pain spreads: right shoulder and right side of neck    Since patient first noticed back pain, pain is: always present, but gets better and worse  Does back pain " "interfere with her job:  Yes  On a scale of 1-10 (10 being the worst), patient describes pain as:  7  What makes back pain worse: certain positions  Acupuncture: not tried  Acetaminophen: not helpful  Activity or exercise: helpful  Chiropractor:  Not tried  Cold: not tried  Heat: helpful  Massage: not helpful  Muscle relaxants: not tried  NSAIDS: not helpful  Opioids: not tried  Physical Therapy: not tried  Rest: not helpful  Steroid Injection: not tried  Stretching: helpful  Surgery: not tried  TENS unit: not tried  Topical pain relievers: not helpful  Other healthcare providers patient is seeing for back pain: None    She eats 2-3 servings of fruits and vegetables daily.She consumes 0 sweetened beverage(s) daily.She exercises with enough effort to increase her heart rate 9 or less minutes per day.  She exercises with enough effort to increase her heart rate 3 or less days per week. She is missing 1 dose(s) of medications per week.     Right neck and shoulder pain x 6 month, no known injury.  Has tried stretching and ice. Has tried tylenol and ibuprofen with minimal relief.  Pain overall is unchanged but has waxed and waned. Has not seen PT    HTN-  On losartan 25 mg daily, bp at home runs 130s systolic.  Denies cp or sob.           Review of Systems         Objective    BP (!) 150/92 (BP Location: Left arm, Patient Position: Sitting, Cuff Size: Adult Regular)   Pulse 82   Ht 1.676 m (5' 6\")   Wt 78.1 kg (172 lb 3 oz)   LMP  (LMP Unknown)   SpO2 98%   BMI 27.79 kg/m    Body mass index is 27.79 kg/m .  Physical Exam   GENERAL: healthy, alert and no distress  NECK: no adenopathy, no asymmetry, masses, or scars and thyroid normal to palpation  RESP: lungs clear to auscultation - no rales, rhonchi or wheezes  CV: regular rate and rhythm, normal S1 S2, no peripheral edema and peripheral pulses strong  ABDOMEN: soft, nontender, no hepatosplenomegaly, no masses and bowel sounds normal  MS: right upper  " trapezius near right scapula.  Full rom of right shoulder and neck.

## 2023-03-21 NOTE — ASSESSMENT & PLAN NOTE
Noted cough with lisinopril, changed to losartan.  Losartan increased to 50 mg 3/21/2023  Follow up 2-3 weeks for bp recheck

## 2023-04-07 ENCOUNTER — HOSPITAL ENCOUNTER (OUTPATIENT)
Dept: PHYSICAL THERAPY | Facility: REHABILITATION | Age: 53
Discharge: HOME | End: 2023-04-07
Attending: PHYSICIAN ASSISTANT
Payer: COMMERCIAL

## 2023-04-07 DIAGNOSIS — M54.2 NECK PAIN ON RIGHT SIDE: ICD-10-CM

## 2023-04-07 DIAGNOSIS — M54.9 UPPER BACK PAIN ON RIGHT SIDE: ICD-10-CM

## 2023-04-07 DIAGNOSIS — M25.511 RIGHT SHOULDER PAIN, UNSPECIFIED CHRONICITY: ICD-10-CM

## 2023-04-07 PROCEDURE — 97140 MANUAL THERAPY 1/> REGIONS: CPT | Mod: GP | Performed by: PHYSICAL THERAPIST

## 2023-04-07 PROCEDURE — 97161 PT EVAL LOW COMPLEX 20 MIN: CPT | Mod: GP | Performed by: PHYSICAL THERAPIST

## 2023-04-07 PROCEDURE — 97110 THERAPEUTIC EXERCISES: CPT | Mod: GP | Performed by: PHYSICAL THERAPIST

## 2023-04-07 NOTE — PROGRESS NOTES
"Subjective: She has been having right-sided neck and shoulder pain with associated headaches for the last six months. The pain started one morning when she woke up and had increased stiffness in her neck making it difficult to rotate. The pain will start in her shoulder and shoot up her head and down her right arm.    Assessment: Edwina presents to therapy with c/o increased cervical spine and shoulder pain that started with insidious onset. She demonstrates decreased cervical AROM side bending and rotation bilaterally with increased pain/tension on the right side. She also has increased tightness and taut bands through her right scalenes, UT, and erector spinae muscles. She has slightly decreased deep neck flexor strength as well. She noted feeling some relief with the manual therapy provided and further relief with the stretching. We discussed the possible benefits of dry needling during today's session. Edwina will benefit from PT to promote decreased pain and increased ROM.    Therapeutic Exercise    Date 4/7/23   Exercise    UT stretch X45\" B   Horizontal add stretch X45\" B   Chin tucks x10   Pec doorway stretch X45\" B                                       Hao uBrgos, PT, DPT, CMTPT/DN           "

## 2023-04-14 ENCOUNTER — HOSPITAL ENCOUNTER (OUTPATIENT)
Dept: PHYSICAL THERAPY | Facility: REHABILITATION | Age: 53
Discharge: HOME | End: 2023-04-14
Payer: COMMERCIAL

## 2023-04-14 DIAGNOSIS — M54.9 UPPER BACK PAIN ON RIGHT SIDE: ICD-10-CM

## 2023-04-14 DIAGNOSIS — M25.511 RIGHT SHOULDER PAIN, UNSPECIFIED CHRONICITY: ICD-10-CM

## 2023-04-14 DIAGNOSIS — M54.2 NECK PAIN ON RIGHT SIDE: Primary | ICD-10-CM

## 2023-04-14 PROCEDURE — 97140 MANUAL THERAPY 1/> REGIONS: CPT | Mod: GP | Performed by: PHYSICAL THERAPIST

## 2023-04-14 PROCEDURE — 97110 THERAPEUTIC EXERCISES: CPT | Mod: GP | Performed by: PHYSICAL THERAPIST

## 2023-04-14 NOTE — PROGRESS NOTES
"Subjective: She was feeling less stiff for a couple of days after the first PT session. She then got a more stiff after sleeping poorly and is now back to feeling better. The exercises feel good when she does them.     Assessment: Edwina is showing good improvement in her overall function and pain levels. She noted feeling less pain in her shoulder and neck following manual therapy today. Her cervical paraspinals felt significantly less painful and had fewer taut bands in them than the first visit. She reported feeling no increase in her UT or shoulder pain with the added postural strengthening exercises today. Edwina will continue to benefit from postural strengthening and exercises for decreased pain.    Therapeutic Exercise    Date 4/14/23 4/7/23   Exercise     UT stretch x1' B X45\" B   Horizontal add stretch  X45\" B   Chin tucks x10 x10   Pec doorway stretch  X45\" B   Infraspinatus stretch 2 x30\" B    Scap squeezes x10    NAGs x10    Wall salma x10                            Hao Burgos, PT, DPT, CMTPT/DN           "

## 2023-04-20 ENCOUNTER — PATIENT OUTREACH (OUTPATIENT)
Dept: CARE COORDINATION | Facility: CLINIC | Age: 53
End: 2023-04-20
Payer: COMMERCIAL

## 2023-04-23 ENCOUNTER — HEALTH MAINTENANCE LETTER (OUTPATIENT)
Age: 53
End: 2023-04-23

## 2023-05-04 ENCOUNTER — PATIENT OUTREACH (OUTPATIENT)
Dept: CARE COORDINATION | Facility: CLINIC | Age: 53
End: 2023-05-04
Payer: COMMERCIAL

## 2023-05-15 NOTE — ADDENDUM NOTE
Encounter addended by: Hao Burgos, PT on: 5/15/2023 11:30 AM   Actions taken: Episode resolved, Clinical Note Signed

## 2023-05-15 NOTE — PROGRESS NOTES
[facility] Rehabilitation Service    Outpatient Physical Therapy Discharge Note  Patient: Edwina Wynne  : 1970    Beginning/End Dates of Reporting Period:  23 to 23    Referring Provider: Zahra Sullivan PA-C    Therapy Diagnosis: Decreased postural endurance, neck pain, shoulder pain.     Client Self Report: See note      Goals:  Goal Identifier NDI   Goal Description Edwina will demonstrate a decrease in pain and increase in function as measured by scoring </= 10/50 on the NDI.   Target Date 23   Date Met      Progress (detail required for progress note): 18/50     Goal Identifier Cervical side bending ROM   Goal Description Edwina will increase bilateral cervical side bending ROM to >/= 30 degrees without pain in order to improve the quality of their ADLs.   Target Date 23   Date Met      Progress (detail required for progress note): 20 R, 16 L     Goal Identifier Cervical rotation ROM   Goal Description Edwina will increase bilateral cervical rotation ROM to >/= 65 degrees without pain in order to improve the quality of their ADLs.   Target Date 23   Date Met      Progress (detail required for progress note): 55 R, 45 L     Goal Identifier Sleeping   Goal Description Edwina will demonstrate improvement in her symptoms as measured by being able to sleep through the night without waking up due to pain at least four nights per week in order to improve her QOL.   Target Date 23   Date Met      Progress (detail required for progress note): Unable     Plan:  Discharge from therapy.    Discharge:    Reason for Discharge: Patient chooses to discontinue therapy.    Discharge Plan: Patient to continue home program.

## 2023-06-19 ENCOUNTER — TELEPHONE (OUTPATIENT)
Dept: DERMATOLOGY | Facility: CLINIC | Age: 53
End: 2023-06-19
Payer: COMMERCIAL

## 2023-06-19 NOTE — TELEPHONE ENCOUNTER
Via phone Patient stated she was seen somewhere else sooner she didn't need to rescheduled the following:    Appointment type: [facility]  Provider:    Return date: 10-19-23  Specialty phone number: [phone]

## 2023-06-26 ENCOUNTER — OFFICE VISIT (OUTPATIENT)
Dept: FAMILY MEDICINE | Facility: CLINIC | Age: 53
End: 2023-06-26
Payer: COMMERCIAL

## 2023-06-26 VITALS
BODY MASS INDEX: 27.44 KG/M2 | DIASTOLIC BLOOD PRESSURE: 83 MMHG | OXYGEN SATURATION: 98 % | RESPIRATION RATE: 18 BRPM | TEMPERATURE: 98.2 F | WEIGHT: 170 LBS | SYSTOLIC BLOOD PRESSURE: 142 MMHG | HEART RATE: 82 BPM

## 2023-06-26 DIAGNOSIS — L01.00 IMPETIGO: Primary | ICD-10-CM

## 2023-06-26 PROCEDURE — 99213 OFFICE O/P EST LOW 20 MIN: CPT | Performed by: PHYSICIAN ASSISTANT

## 2023-06-26 RX ORDER — MUPIROCIN 20 MG/G
OINTMENT TOPICAL 3 TIMES DAILY
Qty: 100 G | Refills: 0 | Status: SHIPPED | OUTPATIENT
Start: 2023-06-26 | End: 2023-07-03

## 2023-06-26 NOTE — PROGRESS NOTES
Chief Complaint   Patient presents with     Derm Problem     Possible impetigo on legs and arm         ASSESSMENT/PLAN:  Edwina was seen today for derm problem.    Diagnoses and all orders for this visit:    Impetigo  -     mupirocin (BACTROBAN) 2 % external ointment; Apply topically 3 times daily for 7 days    Lesions concerning for early impetigo given exposure.  Start on Bactroban above    Ramsey Schaeffer PA-C      SUBJECTIVE:  Edwina is a 52 year old female who presents to urgent care with concerns of tachycardia.  She does  and 3 kids developed impetigo over the last few days.  She is got 3 spots on her right leg and 2 on her right arm that she is concerned would like impetigo    ROS: Pertinent ROS neg other than the symptoms noted above in the HPI.     OBJECTIVE:  BP (!) 142/83 (BP Location: Right arm, Patient Position: Sitting, Cuff Size: Adult Large)   Pulse 82   Temp 98.2  F (36.8  C) (Oral)   Resp 18   Wt 77.1 kg (170 lb)   LMP  (LMP Unknown)   SpO2 98%   BMI 27.44 kg/m     GENERAL: healthy, alert and no distress  SKIN: 3 erythematous papular lesions on right leg, 2 on right arm    DIAGNOSTICS    No results found for any visits on 06/26/23.     Current Outpatient Medications   Medication     Calcium Carbonate-Vitamin D (CALCIUM + D PO)     clobetasol (TEMOVATE) 0.05 % external solution     cyclobenzaprine (FLEXERIL) 5 MG tablet     hydrOXYzine (ATARAX) 25 MG tablet     losartan (COZAAR) 50 MG tablet     multivitamin w/minerals (THERA-VIT-M) tablet     losartan (COZAAR) 25 MG tablet     No current facility-administered medications for this visit.      Patient Active Problem List   Diagnosis     Ventral hernia, recurrent     Essential hypertension, benign     Choledocholithiasis with acute cholecystitis     Annual physical exam     Menopausal syndrome (hot flashes)     Allergic rhinitis     Dyslipidemia     Genital herpes     Palpitations     Neck pain on right side     Right shoulder pain,  unspecified chronicity     Upper back pain on right side      Past Medical History:   Diagnosis Date     Anemia     PREGNANCY     Herpes simplex without mention of complication      HSV      Hypertension     Gestational     PONV (postoperative nausea and vomiting)     POST C SECTION     Pre-eclampsia      Tubal occlusion 2016     Past Surgical History:   Procedure Laterality Date     C BREAST PROSTHESIS, NOS  2004      SECTION       HERNIORRHAPHY VENTRAL  10/26/2012    Procedure: HERNIORRHAPHY VENTRAL;  Open Ventral Hernia Repair  ;  Surgeon: Shaun Clinton MD;  Location: [facility] OR     LAPAROSCOPIC CHOLECYSTECTOMY N/A 2018    Procedure: LAPAROSCOPIC CHOLECYSTECTOMY;  LAPAROSCOPIC CHOLECYSTECTOMY .;  Surgeon: Brandon Reagan MD;  Location: [facility] OR     LAPAROSCOPY DIAGNOSTIC (GYN) Bilateral 2016    Procedure: LAPAROSCOPY DIAGNOSTIC (GYN);  Surgeon: Ventura Flower MD;  Location: [facility]     LAPAROTOMY MINI, TUBAL LIGATION (POST PARTUM), COMBINED  2012    Procedure:COMBINED LAPAROTOMY MINI, TUBAL LIGATION (POST PARTUM); Surgeon:RAVEN YARBROUGH; Location: [facility]     TUBOPLASTY REANASTOMOSIS Bilateral 2016    Procedure: TUBOPLASTY REANASTOMOSIS;  Surgeon: Ventura Flower MD;  Location: [facility]     Family History   Problem Relation Age of Onset     Hypertension Father      Uterine Cancer Sister         leiomyosarcoma     Hypertension Brother      Cerebrovascular Disease Maternal Grandmother 50     Macular Degeneration Paternal Grandmother      Eye Disorder Other         Great aunt, retinitis pigmentosa     Asthma No family hx of      C.A.D. No family hx of      Diabetes No family hx of      Breast Cancer No family hx of      Cancer - colorectal No family hx of      Prostate Cancer No family hx of      Glaucoma No family hx of      Social History     Tobacco Use     Smoking status: Former     Packs/day: 1.00     Years: 15.00     Pack years: 15.00     Types: Cigarettes      Quit date: 2000     Years since quittin.4     Smokeless tobacco: Never   Substance Use Topics     Alcohol use: No     Alcohol/week: 0.0 standard drinks of alcohol     Comment: occas              The plan of care was discussed with the patient. They understand and agree with the course of treatment prescribed. A printed summary was given including instructions and medications.  The use of Dragon/[employer] dictation services may have been used to construct the content in this note; any grammatical or spelling errors are non-intentional. Please contact the author of this note directly if you are in need of any clarification.

## 2023-07-22 ENCOUNTER — HEALTH MAINTENANCE LETTER (OUTPATIENT)
Age: 53
End: 2023-07-22

## 2023-11-03 DIAGNOSIS — I10 ESSENTIAL HYPERTENSION, BENIGN: ICD-10-CM

## 2023-11-03 RX ORDER — LOSARTAN POTASSIUM 50 MG/1
50 TABLET ORAL DAILY
Qty: 90 TABLET | Refills: 1 | OUTPATIENT
Start: 2023-11-03

## 2023-11-03 RX ORDER — LOSARTAN POTASSIUM 50 MG/1
50 TABLET ORAL DAILY
Qty: 90 TABLET | Refills: 0 | Status: SHIPPED | OUTPATIENT
Start: 2023-11-03 | End: 2023-11-07

## 2023-11-06 ENCOUNTER — IMMUNIZATION (OUTPATIENT)
Dept: FAMILY MEDICINE | Facility: CLINIC | Age: 53
End: 2023-11-06
Payer: COMMERCIAL

## 2023-11-06 PROCEDURE — 91320 SARSCV2 VAC 30MCG TRS-SUC IM: CPT

## 2023-11-06 PROCEDURE — 90471 IMMUNIZATION ADMIN: CPT

## 2023-11-06 PROCEDURE — 90480 ADMN SARSCOV2 VAC 1/ONLY CMP: CPT

## 2023-11-06 PROCEDURE — 90686 IIV4 VACC NO PRSV 0.5 ML IM: CPT

## 2023-11-07 ENCOUNTER — OFFICE VISIT (OUTPATIENT)
Dept: FAMILY MEDICINE | Facility: CLINIC | Age: 53
End: 2023-11-07
Payer: COMMERCIAL

## 2023-11-07 VITALS
SYSTOLIC BLOOD PRESSURE: 132 MMHG | OXYGEN SATURATION: 98 % | DIASTOLIC BLOOD PRESSURE: 84 MMHG | BODY MASS INDEX: 27.44 KG/M2 | WEIGHT: 170 LBS | HEART RATE: 78 BPM

## 2023-11-07 DIAGNOSIS — Z12.31 ENCOUNTER FOR SCREENING MAMMOGRAM FOR BREAST CANCER: Primary | ICD-10-CM

## 2023-11-07 DIAGNOSIS — N95.1 MENOPAUSAL SYNDROME (HOT FLASHES): ICD-10-CM

## 2023-11-07 DIAGNOSIS — I10 ESSENTIAL HYPERTENSION, BENIGN: ICD-10-CM

## 2023-11-07 LAB
ALBUMIN SERPL BCG-MCNC: 4.4 G/DL (ref 3.5–5.2)
ALP SERPL-CCNC: 66 U/L (ref 35–104)
ALT SERPL W P-5'-P-CCNC: 12 U/L (ref 0–50)
ANION GAP SERPL CALCULATED.3IONS-SCNC: 9 MMOL/L (ref 7–15)
AST SERPL W P-5'-P-CCNC: 19 U/L (ref 0–45)
BILIRUB SERPL-MCNC: 0.4 MG/DL
BUN SERPL-MCNC: 11.5 MG/DL (ref 6–20)
CALCIUM SERPL-MCNC: 9.5 MG/DL (ref 8.6–10)
CHLORIDE SERPL-SCNC: 104 MMOL/L (ref 98–107)
CREAT SERPL-MCNC: 0.6 MG/DL (ref 0.51–0.95)
DEPRECATED HCO3 PLAS-SCNC: 27 MMOL/L (ref 22–29)
EGFRCR SERPLBLD CKD-EPI 2021: >90 ML/MIN/1.73M2
GLUCOSE SERPL-MCNC: 73 MG/DL (ref 70–99)
POTASSIUM SERPL-SCNC: 4.2 MMOL/L (ref 3.4–5.3)
PROT SERPL-MCNC: 6.8 G/DL (ref 6.4–8.3)
SODIUM SERPL-SCNC: 140 MMOL/L (ref 135–145)

## 2023-11-07 PROCEDURE — 99214 OFFICE O/P EST MOD 30 MIN: CPT | Performed by: PHYSICIAN ASSISTANT

## 2023-11-07 PROCEDURE — 80053 COMPREHEN METABOLIC PANEL: CPT | Performed by: PHYSICIAN ASSISTANT

## 2023-11-07 PROCEDURE — 36415 COLL VENOUS BLD VENIPUNCTURE: CPT | Performed by: PHYSICIAN ASSISTANT

## 2023-11-07 RX ORDER — LOSARTAN POTASSIUM 50 MG/1
50 TABLET ORAL DAILY
Qty: 90 TABLET | Refills: 3 | Status: SHIPPED | OUTPATIENT
Start: 2023-11-07

## 2023-11-07 RX ORDER — VENLAFAXINE HYDROCHLORIDE 37.5 MG/1
75 CAPSULE, EXTENDED RELEASE ORAL DAILY
Qty: 180 CAPSULE | Refills: 3 | Status: SHIPPED | OUTPATIENT
Start: 2023-11-07 | End: 2024-04-18

## 2023-11-07 NOTE — PROGRESS NOTES
"  Assessment & Plan     Essential hypertension, benign  Well-controlled we will continue at losartan 50 mg we will check blood work today  - Comprehensive metabolic panel (BMP + Alb, Alk Phos, ALT, AST, Total. Bili, TP)  - losartan (COZAAR) 50 MG tablet  Dispense: 90 tablet; Refill: 3  - Comprehensive metabolic panel (BMP + Alb, Alk Phos, ALT, AST, Total. Bili, TP)    Encounter for screening mammogram for breast cancer  - *MA Screening Digital Bilateral    Menopausal syndrome (hot flashes)  Complains of hot flashes sometimes during the day but worse at night has been on estrogen patch and progesterone in the past stopped because the patch kept falling off no personal history of blood clots but her mother does have history of blood clots and takes a blood thinner.  Discussed options patient elects to trial venlafaxine we will start at 37.5 mg x 1 week okay to increase to 2 capsules if needed  - venlafaxine (EFFEXOR XR) 37.5 MG 24 hr capsule  Dispense: 180 capsule; Refill: 3         BMI:   Estimated body mass index is 27.44 kg/m  as calculated from the following:    Height as of 3/21/23: 1.676 m (5' 6\").    Weight as of this encounter: 77.1 kg (170 lb).   Weight management plan: not addressed so I        Zahra Sullivan PA-C  [facility]    Judy Bland is a 53 year old, presenting for the following health issues:  Recheck Medication (Losartan)        11/7/2023     9:21 AM   Additional Questions   Roomed by Deloris   Accompanied by self       History of Present Illness       Hypertension: She presents for follow up of hypertension.  She does not check blood pressure  regularly outside of the clinic. Outpatient blood pressures have not been over 140/90. She follows a low salt diet.     She eats 2-3 servings of fruits and vegetables daily.She consumes 0 sweetened beverage(s) daily.She exercises with enough effort to increase her heart rate 10 to 19 minutes per day.  She exercises " with enough effort to increase her heart rate 4 days per week.   She is taking medications regularly.       Hypertension-on losartan was at 25 mg increased to 50 mg at last visit she does not check her blood pressures at home denies chest pain or shortness of breath she does do a low-salt diet    Menopausal symptoms-notes hot flashes that have been pretty severe and waking her up at night multiple times a night due to sweating.  She was previously on estrogen patch and progesterone notes the estrogen patch kept falling off so she discontinued the medications she does have a uterus.  Her mother has a history of blood clots and takes blood thinners patient has never been worked up for coagulopathy patient does not have personal history of blood clots      Review of Systems   Constitutional, HEENT, cardiovascular, pulmonary, gi and gu systems are negative, except as otherwise noted.      Objective    /84   Pulse 78   Wt 77.1 kg (170 lb)   LMP  (LMP Unknown)   SpO2 98%   BMI 27.44 kg/m    Body mass index is 27.44 kg/m .  Physical Exam   GENERAL: healthy, alert and no distress  NECK: no adenopathy, no asymmetry, masses, or scars and thyroid normal to palpation  RESP: lungs clear to auscultation - no rales, rhonchi or wheezes  CV: regular rate and rhythm, normal S1 S2, no S3 or S4, no murmur, click or rub, no peripheral edema and peripheral pulses strong  ABDOMEN: soft, nontender, no hepatosplenomegaly, no masses and bowel sounds normal  MS: no gross musculoskeletal defects noted, no edema

## 2023-11-08 ENCOUNTER — TELEPHONE (OUTPATIENT)
Dept: OPTOMETRY | Facility: CLINIC | Age: 53
End: 2023-11-08

## 2023-11-09 ENCOUNTER — OFFICE VISIT (OUTPATIENT)
Dept: OPTOMETRY | Facility: CLINIC | Age: 53
End: 2023-11-09
Payer: COMMERCIAL

## 2023-11-09 DIAGNOSIS — H52.4 HYPEROPIA WITH PRESBYOPIA OF BOTH EYES: Primary | ICD-10-CM

## 2023-11-09 DIAGNOSIS — H52.03 HYPEROPIA WITH PRESBYOPIA OF BOTH EYES: Primary | ICD-10-CM

## 2023-11-09 ASSESSMENT — REFRACTION_CURRENTRX
OS_BASECURVE: 8.4
OS_BRAND: ACUVUE 1 DAY MOIST MULTIFOCAL
OS_SPHERE: +3.25
OD_BASECURVE: 8.5
OS_BRAND: DAILIES TOTAL 1 MULTIFOCAL
OS_DIAMETER: 14.3
OS_ADD: HIGH
OD_DIAMETER: 14.2
OS_SPHERE: +3.25
OD_SPHERE: +2.25
OD_BASECURVE: 8.5
OD_BRAND: ACUVUE 1 DAY MOIST
OS_ADD: HIGH
OS_BASECURVE: 8.5
OD_DIAMETER: 14.1
OD_BRAND: DAILIES TOTAL 1
OD_SPHERE: +2.25
OS_DIAMETER: 14.1

## 2023-11-09 NOTE — PROGRESS NOTES
No office visit. CL order only. Pt Rx  10/29/23 - okay for short term order until she can return for updated exam    Contact Lens Billing  V-Code:  - Soft spherical right eye-  - Soft bifocal left eye  Final Contact Lens Rx         Brand Base Curve Diameter Sphere Add Lens    Right Dailies Total 1  8.5 14.1 +2.25  (silicone)    Left Dailies Total 1 Multifocal 8.5 14.1 +3.25 High  (silicone)           Fait order mailed direct: [id number]   # of units:   1 box right eye @ $100 per box  1 box left eye @ $140 per box  + $7.95 shipping   =$247.95 total    These are for cosmetic contact lenses.    Encounter Diagnosis   Name Primary?    Hyperopia with presbyopia of both eyes Yes        Date of last eye exam: 10/29/21 RX  - ONE TIME ORDER ONLY

## 2023-12-13 ENCOUNTER — HOSPITAL ENCOUNTER (OUTPATIENT)
Dept: MAMMOGRAPHY | Facility: CLINIC | Age: 53
Discharge: HOME | End: 2023-12-13
Attending: PHYSICIAN ASSISTANT | Admitting: PHYSICIAN ASSISTANT
Payer: COMMERCIAL

## 2023-12-13 DIAGNOSIS — Z12.31 ENCOUNTER FOR SCREENING MAMMOGRAM FOR BREAST CANCER: ICD-10-CM

## 2023-12-13 PROCEDURE — 77067 SCR MAMMO BI INCL CAD: CPT

## 2024-01-02 ENCOUNTER — OFFICE VISIT (OUTPATIENT)
Dept: OPTOMETRY | Facility: CLINIC | Age: 54
End: 2024-01-02

## 2024-01-02 DIAGNOSIS — H52.4 HYPEROPIA WITH PRESBYOPIA OF BOTH EYES: Primary | ICD-10-CM

## 2024-01-02 DIAGNOSIS — H52.03 HYPEROPIA WITH PRESBYOPIA OF BOTH EYES: Primary | ICD-10-CM

## 2024-01-02 ASSESSMENT — SLIT LAMP EXAM - LIDS
COMMENTS: MGD
COMMENTS: MGD

## 2024-01-02 ASSESSMENT — REFRACTION_CURRENTRX
OS_BRAND: DAILIES TOTAL 1 MULTIFOCAL
OS_DIAMETER: 14.1
OS_SPHERE: +3.25
OD_DIAMETER: 14.1
OD_BASECURVE: 8.5
OS_ADD: HIGH
OD_BRAND: DAILIES TOTAL 1
OS_BASECURVE: 8.5
OD_SPHERE: +2.25

## 2024-01-02 ASSESSMENT — VISUAL ACUITY
METHOD: SNELLEN - LINEAR
CORRECTION_TYPE: CONTACTS
OD_CC: 20/20
OS_CC: 20/20
OS_CC: 20/20
OD_CC: 20/20

## 2024-01-02 ASSESSMENT — TONOMETRY
OD_IOP_MMHG: 10
OS_IOP_MMHG: 11
IOP_METHOD: ICARE

## 2024-01-02 ASSESSMENT — REFRACTION_MANIFEST
OD_SPHERE: +2.00
OD_CYLINDER: +0.75
OS_AXIS: 180
OS_SPHERE: +2.00
OD_AXIS: 170
OD_ADD: +2.00
OS_ADD: +2.00
OS_CYLINDER: +0.75

## 2024-01-02 ASSESSMENT — CONF VISUAL FIELD
OS_INFERIOR_TEMPORAL_RESTRICTION: 0
OD_NORMAL: 1
OS_NORMAL: 1
OD_INFERIOR_TEMPORAL_RESTRICTION: 0
OD_INFERIOR_NASAL_RESTRICTION: 0
OS_SUPERIOR_TEMPORAL_RESTRICTION: 0
OD_SUPERIOR_NASAL_RESTRICTION: 0
OS_INFERIOR_NASAL_RESTRICTION: 0
METHOD: COUNTING FINGERS
OS_SUPERIOR_NASAL_RESTRICTION: 0
OD_SUPERIOR_TEMPORAL_RESTRICTION: 0

## 2024-01-02 ASSESSMENT — REFRACTION_WEARINGRX
OD_AXIS: 167
OD_SPHERE: +2.00
OS_AXIS: 180
OS_CYLINDER: +0.75
OD_ADD: +2.00
OS_ADD: +2.00
OD_CYLINDER: +0.75
OS_SPHERE: +1.75

## 2024-01-02 ASSESSMENT — CUP TO DISC RATIO
OD_RATIO: 0.25
OS_RATIO: 0.25

## 2024-01-02 ASSESSMENT — EXTERNAL EXAM - LEFT EYE: OS_EXAM: NORMAL

## 2024-01-02 ASSESSMENT — EXTERNAL EXAM - RIGHT EYE: OD_EXAM: NORMAL

## 2024-01-02 NOTE — NURSING NOTE
Chief Complaints and History of Present Illnesses   Patient presents with    Annual Eye Exam     Pt here for annual exam with contacts.     Chief Complaint(s) and History of Present Illness(es)       Annual Eye Exam              Laterality: both eyes    Comments: Pt here for annual exam with contacts.              Comments    Pt has largely unchanged vision since last exam. Does note she needs to add readers over her lenses. She does not want the power adjusted.   Ruma Olguin, COA on 1/2/2024 at 9:44 AM

## 2024-01-02 NOTE — PROGRESS NOTES
A/P  1.) Hyperopia/Presbyopia OU  -Overall doing well in modified monovision left eye near. Needing readers for very fine print only  -Did give trial of low add right eye but she will likely prefer to keep the same  -Continue in current glasses and CL's  -Dilated ocular health unremarkable OU    Monitor 1-2 years comprehensive, sooner prn    I have confirmed the patient's CC, HPI and reviewed Past Medical History, Past Surgical History, Social History, Family History, Problem List, Medication List and agree with Tech note.     Damari Dunn, OD FAAO FSLS

## 2024-02-13 NOTE — ANESTHESIA PROCEDURE NOTES
Peripheral Nerve Block Procedure Note    Staff:     Anesthesiologist:  SPARKLE BHARDWAJ    Resident/CRNA:  TERRA BILLINGS    Block performed by resident/CRNA in the presence of a teaching physician    Location: Pre-op  Procedure Start/Stop TImes:      5/22/2018 12:41 PM     5/22/2018 12:54 PM    patient identified, IV checked, site marked, risks and benefits discussed, informed consent, monitors and equipment checked, pre-op evaluation, at physician/surgeon's request and post-op pain management      Correct Patient: Yes      Correct Position: Yes      Correct Site: Yes      Correct Procedure: Yes      Correct Laterality:  Yes    Site Marked:  Yes  Procedure details:     Procedure:  TAP    ASA:  2    Diagnosis:  Post op pain    Laterality:  Bilateral    Position:  Supine    Sterile Prep: chloraprep, mask and sterile gloves      Needle:  Insulated and short bevel    Needle gauge:  21    Needle length (mm):  100    Ultrasound: Yes      Ultrasound used to identify targeted nerve, plexus, or vascular structure and placed a needle adjacent to it      Permanent Image entered into patiient's record      Abnormal pain on injection: No      Blood Aspirated: No      Paresthesias:  No    Bleeding at site: No      Bolus via:  Needle    Infusion Method:  Single Shot    Complications:  None  Assessment/Narrative:     Injection made incrementally with aspirations every (mL):  5        
Patient requests all Lab, Cardiology, and Radiology Results on their Discharge Instructions

## 2024-03-08 ENCOUNTER — MYC MEDICAL ADVICE (OUTPATIENT)
Dept: FAMILY MEDICINE | Facility: CLINIC | Age: 54
End: 2024-03-08

## 2024-03-08 DIAGNOSIS — F41.8 SITUATIONAL ANXIETY: Primary | ICD-10-CM

## 2024-03-08 NOTE — TELEPHONE ENCOUNTER
See MyChart from Patient needing PCP reponse.    Patient is scheduled with you for 4/18/2024 follow up    HAM Fowler  [facility]

## 2024-03-11 NOTE — TELEPHONE ENCOUNTER
Sending to PCP for review.  Please review and advise on patient MyChart message.    Patient reports she is taking Venlafaxine 75 mg.    Please send new prescription for Venlafaxine 37.5 with taper instructions for patient.     Dashawn Mcintosh RN  [facility]

## 2024-03-12 RX ORDER — VENLAFAXINE HYDROCHLORIDE 37.5 MG/1
37.5 CAPSULE, EXTENDED RELEASE ORAL DAILY
Qty: 90 CAPSULE | Refills: 0 | Status: SHIPPED | OUTPATIENT
Start: 2024-03-12 | End: 2024-04-18

## 2024-04-18 ENCOUNTER — OFFICE VISIT (OUTPATIENT)
Dept: FAMILY MEDICINE | Facility: CLINIC | Age: 54
End: 2024-04-18
Attending: PHYSICIAN ASSISTANT
Payer: COMMERCIAL

## 2024-04-18 VITALS
DIASTOLIC BLOOD PRESSURE: 88 MMHG | HEART RATE: 97 BPM | BODY MASS INDEX: 26.33 KG/M2 | SYSTOLIC BLOOD PRESSURE: 130 MMHG | OXYGEN SATURATION: 97 % | HEIGHT: 65 IN | WEIGHT: 158 LBS

## 2024-04-18 DIAGNOSIS — M54.2 NECK PAIN ON RIGHT SIDE: ICD-10-CM

## 2024-04-18 DIAGNOSIS — Z13.1 SCREENING FOR DIABETES MELLITUS: ICD-10-CM

## 2024-04-18 DIAGNOSIS — D25.9 UTERINE LEIOMYOMA, UNSPECIFIED LOCATION: ICD-10-CM

## 2024-04-18 DIAGNOSIS — N95.1 MENOPAUSAL SYNDROME (HOT FLASHES): ICD-10-CM

## 2024-04-18 DIAGNOSIS — Z00.00 ROUTINE GENERAL MEDICAL EXAMINATION AT A HEALTH CARE FACILITY: Primary | ICD-10-CM

## 2024-04-18 DIAGNOSIS — Z83.2 FAMILY HISTORY OF HYPERCOAGULABILITY: ICD-10-CM

## 2024-04-18 DIAGNOSIS — F41.8 SITUATIONAL ANXIETY: ICD-10-CM

## 2024-04-18 DIAGNOSIS — Z13.220 LIPID SCREENING: ICD-10-CM

## 2024-04-18 PROCEDURE — 99214 OFFICE O/P EST MOD 30 MIN: CPT | Mod: 25 | Performed by: PHYSICIAN ASSISTANT

## 2024-04-18 PROCEDURE — 99396 PREV VISIT EST AGE 40-64: CPT | Performed by: PHYSICIAN ASSISTANT

## 2024-04-18 RX ORDER — PROGESTERONE 100 MG/1
100 CAPSULE ORAL DAILY
Qty: 90 CAPSULE | Refills: 3 | Status: SHIPPED | OUTPATIENT
Start: 2024-04-18

## 2024-04-18 RX ORDER — HYDROXYZINE HYDROCHLORIDE 25 MG/1
25 TABLET, FILM COATED ORAL AT BEDTIME
Qty: 90 TABLET | Refills: 3 | Status: SHIPPED | OUTPATIENT
Start: 2024-04-18

## 2024-04-18 RX ORDER — ESTRADIOL 0.05 MG/D
1 PATCH TRANSDERMAL WEEKLY
Qty: 4 PATCH | Refills: 11 | Status: SHIPPED | OUTPATIENT
Start: 2024-04-18 | End: 2024-05-14

## 2024-04-18 RX ORDER — CYCLOBENZAPRINE HCL 5 MG
5 TABLET ORAL
Qty: 30 TABLET | Refills: 0 | Status: SHIPPED | OUTPATIENT
Start: 2024-04-18

## 2024-04-18 SDOH — HEALTH STABILITY: PHYSICAL HEALTH: ON AVERAGE, HOW MANY DAYS PER WEEK DO YOU ENGAGE IN MODERATE TO STRENUOUS EXERCISE (LIKE A BRISK WALK)?: 2 DAYS

## 2024-04-18 ASSESSMENT — ANXIETY QUESTIONNAIRES
6. BECOMING EASILY ANNOYED OR IRRITABLE: SEVERAL DAYS
GAD7 TOTAL SCORE: 1
4. TROUBLE RELAXING: NOT AT ALL
7. FEELING AFRAID AS IF SOMETHING AWFUL MIGHT HAPPEN: NOT AT ALL
2. NOT BEING ABLE TO STOP OR CONTROL WORRYING: NOT AT ALL
1. FEELING NERVOUS, ANXIOUS, OR ON EDGE: NOT AT ALL
5. BEING SO RESTLESS THAT IT IS HARD TO SIT STILL: NOT AT ALL
7. FEELING AFRAID AS IF SOMETHING AWFUL MIGHT HAPPEN: NOT AT ALL
3. WORRYING TOO MUCH ABOUT DIFFERENT THINGS: NOT AT ALL
GAD7 TOTAL SCORE: 1
8. IF YOU CHECKED OFF ANY PROBLEMS, HOW DIFFICULT HAVE THESE MADE IT FOR YOU TO DO YOUR WORK, TAKE CARE OF THINGS AT HOME, OR GET ALONG WITH OTHER PEOPLE?: NOT DIFFICULT AT ALL
IF YOU CHECKED OFF ANY PROBLEMS ON THIS QUESTIONNAIRE, HOW DIFFICULT HAVE THESE PROBLEMS MADE IT FOR YOU TO DO YOUR WORK, TAKE CARE OF THINGS AT HOME, OR GET ALONG WITH OTHER PEOPLE: NOT DIFFICULT AT ALL
GAD7 TOTAL SCORE: 1

## 2024-04-18 ASSESSMENT — SOCIAL DETERMINANTS OF HEALTH (SDOH): HOW OFTEN DO YOU GET TOGETHER WITH FRIENDS OR RELATIVES?: TWICE A WEEK

## 2024-04-18 ASSESSMENT — PATIENT HEALTH QUESTIONNAIRE - PHQ9
SUM OF ALL RESPONSES TO PHQ QUESTIONS 1-9: 3
SUM OF ALL RESPONSES TO PHQ QUESTIONS 1-9: 3
10. IF YOU CHECKED OFF ANY PROBLEMS, HOW DIFFICULT HAVE THESE PROBLEMS MADE IT FOR YOU TO DO YOUR WORK, TAKE CARE OF THINGS AT HOME, OR GET ALONG WITH OTHER PEOPLE: NOT DIFFICULT AT ALL

## 2024-04-18 NOTE — PATIENT INSTRUCTIONS
Preventive Care Advice   This is general advice given by our system to help you stay healthy. However, your care team may have specific advice just for you. Please talk to your care team about your preventive care needs.  Nutrition  Eat 5 or more servings of fruits and vegetables each day.  Try wheat bread, brown rice and whole grain pasta (instead of white bread, rice, and pasta).  Get enough calcium and vitamin D. Check the label on foods and aim for 100% of the RDA (recommended daily allowance).  Lifestyle  Exercise at least 150 minutes each week   (30 minutes a day, 5 days a week).  Do muscle strengthening activities 2 days a week. These help control your weight and prevent disease.  No smoking.  Wear sunscreen to prevent skin cancer.  Have a dental exam and cleaning every 6 months.  Yearly exams  See your health care team every year to talk about:  Any changes in your health.  Any medicines your care team has prescribed.  Preventive care, family planning, and ways to prevent chronic diseases.  Shots (vaccines)   HPV shots (up to age 26), if you've never had them before.  Hepatitis B shots (up to age 59), if you've never had them before.  COVID-19 shot: Get this shot when it's due.  Flu shot: Get a flu shot every year.  Tetanus shot: Get a tetanus shot every 10 years.  Pneumococcal, hepatitis A, and RSV shots: Ask your care team if you need these based on your risk.  Shingles shot (for age 50 and up).  General health tests  Diabetes screening:  Starting at age 35, Get screened for diabetes at least every 3 years.  If you are younger than age 35, ask your care team if you should be screened for diabetes.  Cholesterol test: At age 39, start having a cholesterol test every 5 years, or more often if advised.  Bone density scan (DEXA): At age 50, ask your care team if you should have this scan for osteoporosis (brittle bones).  Hepatitis C: Get tested at least once in your life.  STIs (sexually transmitted  infections)  Before age 24: Ask your care team if you should be screened for STIs.  After age 24: Get screened for STIs if you're at risk. You are at risk for STIs (including HIV) if:  You are sexually active with more than one person.  You don't use condoms every time.  You or a partner was diagnosed with a sexually transmitted infection.  If you are at risk for HIV, ask about PrEP medicine to prevent HIV.  Get tested for HIV at least once in your life, whether you are at risk for HIV or not.  Cancer screening tests  Cervical cancer screening: If you have a cervix, begin getting regular cervical cancer screening tests at age 21. Most people who have regular screenings with normal results can stop after age 65. Talk about this with your provider.  Breast cancer scan (mammogram): If you've ever had breasts, begin having regular mammograms starting at age 40. This is a scan to check for breast cancer.  Colon cancer screening: It is important to start screening for colon cancer at age 45.  Have a colonoscopy test every 10 years (or more often if you're at risk) Or, ask your provider about stool tests like a FIT test every year or Cologuard test every 3 years.  To learn more about your testing options, visit: https://www.Ning/728050.pdf.  For help making a decision, visit: https://bit.ly/pt33900.  Prostate cancer screening test: If you have a prostate and are age 55 to 69, ask your provider if you would benefit from a yearly prostate cancer screening test.  Lung cancer screening: If you are a current or former smoker age 50 to 80, ask your care team if ongoing lung cancer screenings are right for you.  For informational purposes only. Not to replace the advice of your health care provider. Copyright   2023 [employer]. All rights reserved. Clinically reviewed by the [facility]. [employer] 199228 - REV 01/24.    Learning About Stress  What is stress?     Stress is your  body's response to a hard situation. Your body can have a physical, emotional, or mental response. Stress is a fact of life for most people, and it affects everyone differently. What causes stress for you may not be stressful for someone else.  A lot of things can cause stress. You may feel stress when you go on a job interview, take a test, or run a race. This kind of short-term stress is normal and even useful. It can help you if you need to work hard or react quickly. For example, stress can help you finish an important job on time.  Long-term stress is caused by ongoing stressful situations or events. Examples of long-term stress include long-term health problems, ongoing problems at work, or conflicts in your family. Long-term stress can harm your health.  How does stress affect your health?  When you are stressed, your body responds as though you are in danger. It makes hormones that speed up your heart, make you breathe faster, and give you a burst of energy. This is called the fight-or-flight stress response. If the stress is over quickly, your body goes back to normal and no harm is done.  But if stress happens too often or lasts too long, it can have bad effects. Long-term stress can make you more likely to get sick, and it can make symptoms of some diseases worse. If you tense up when you are stressed, you may develop neck, shoulder, or low back pain. Stress is linked to high blood pressure and heart disease.  Stress also harms your emotional health. It can make you calix, tense, or depressed. Your relationships may suffer, and you may not do well at work or school.  What can you do to manage stress?  You can try these things to help manage stress:   Do something active. Exercise or activity can help reduce stress. Walking is a great way to get started. Even everyday activities such as housecleaning or yard work can help.  Try yoga or agapito chi. These techniques combine exercise and meditation. You may need  some training at first to learn them.  Do something you enjoy. For example, listen to music or go to a movie. Practice your hobby or do volunteer work.  Meditate. This can help you relax, because you are not worrying about what happened before or what may happen in the future.  Do guided imagery. Imagine yourself in any setting that helps you feel calm. You can use online videos, books, or a teacher to guide you.  Do breathing exercises. For example:  From a standing position, bend forward from the waist with your knees slightly bent. Let your arms dangle close to the floor.  Breathe in slowly and deeply as you return to a standing position. Roll up slowly and lift your head last.  Hold your breath for just a few seconds in the standing position.  Breathe out slowly and bend forward from the waist.  Let your feelings out. Talk, laugh, cry, and express anger when you need to. Talking with supportive friends or family, a counselor, or a chloé leader about your feelings is a healthy way to relieve stress. Avoid discussing your feelings with people who make you feel worse.  Write. It may help to write about things that are bothering you. This helps you find out how much stress you feel and what is causing it. When you know this, you can find better ways to cope.  What can you do to prevent stress?  You might try some of these things to help prevent stress:  Manage your time. This helps you find time to do the things you want and need to do.  Get enough sleep. Your body recovers from the stresses of the day while you are sleeping.  Get support. Your family, friends, and community can make a difference in how you experience stress.  Limit your news feed. Avoid or limit time on social media or news that may make you feel stressed.  Do something active. Exercise or activity can help reduce stress. Walking is a great way to get started.  Where can you learn more?  Go to https://www.healthwise.net/patiented  Enter N032 in the  "search box to learn more about \"Learning About Stress.\"  Current as of: October 24, 2023               Content Version: 14.0    7645-3323 [employer].   Care instructions adapted under license by your healthcare professional. If you have questions about a medical condition or this instruction, always ask your healthcare professional. [employer] disclaims any warranty or liability for your use of this information.      "

## 2024-04-18 NOTE — PROGRESS NOTES
Preventive Care Visit  [facility] JOVANY Sullivan PA-C, Family Medicine  Apr 18, 2024      Assessment & Plan   Problem List Items Addressed This Visit       Menopausal syndrome (hot flashes)    Relevant Medications    progesterone (PROMETRIUM) 100 MG capsule    estradiol (CLIMARA) 0.05 MG/24HR weekly patch    Neck pain on right side    Relevant Medications    cyclobenzaprine (FLEXERIL) 5 MG tablet     Other Visit Diagnoses       Routine general medical examination at a health care facility    -  Primary    Situational anxiety        Relevant Medications    hydrOXYzine HCl (ATARAX) 25 MG tablet    Lipid screening        Relevant Orders    Lipid panel reflex to direct LDL Non-fasting (Completed)    Screening for diabetes mellitus        Relevant Orders    Hemoglobin A1c (Completed)    Uterine leiomyoma, unspecified location        Relevant Orders    US Pelvic Complete with Transvaginal    Family history of hypercoagulability        Relevant Orders    Protein C chromogenic    Protein S Antigen Free    Factor 5 leiden mutation analysis    Lupus Anticoagulant Panel (Completed)                Counseling  Appropriate preventive services were discussed with this patient, including applicable screening as appropriate for fall prevention, nutrition, physical activity, Tobacco-use cessation, weight loss and cognition.  Checklist reviewing preventive services available has been given to the patient.  Reviewed patient's diet, addressing concerns and/or questions.   She is at risk for lack of exercise and has been provided with information to increase physical activity for the benefit of her well-being.   She is at risk for psychosocial distress and has been provided with information to reduce risk.       Judy Bland is a 53 year old, presenting for the following:  No chief complaint on file.        4/18/2024     9:31 AM   Additional Questions   Roomed by Deloris   Accompanied by [facility]        Health  Care Directive  Patient does not have a Health Care Directive or Living Will: Discussed advance care planning with patient; however, patient declined at this time.    HPI  Menopause- having hot flashes, not sleeping, feeling irritable, lmp about 2 years ago.  Interested in hrt, had been on estrogen patches in the past and they fell off after a couple of days.  No personal h/o blood clots but notes mom has had multiple and is on blood thinners.  No fh breast cancer.           4/18/2024   General Health   How would you rate your overall physical health? Good   Feel stress (tense, anxious, or unable to sleep) Only a little   (!) STRESS CONCERN      4/18/2024   Nutrition   Three or more servings of calcium each day? Yes   Diet: Regular (no restrictions)   How many servings of fruit and vegetables per day? (!) 2-3   How many sweetened beverages each day? 0-1         4/18/2024   Exercise   Days per week of moderate/strenous exercise 2 days   (!) EXERCISE CONCERN      4/18/2024   Social Factors   Frequency of gathering with friends or relatives Twice a week   Worry food won't last until get money to buy more No   Food not last or not have enough money for food? No   Do you have housing?  Yes   Are you worried about losing your housing? No   Lack of transportation? No   Unable to get utilities (heat,electricity)? No         4/18/2024   Fall Risk   Fallen 2 or more times in the past year? No   Trouble with walking or balance? No          4/18/2024   Dental   Dentist two times every year? Yes         4/18/2024   TB Screening   Were you born outside of the [place]? No       Today's PHQ-9 Score:       4/18/2024     9:28 AM   PHQ-9 SCORE   PHQ-9 Total Score MyChart 3 (Minimal depression)   PHQ-9 Total Score 3         4/18/2024   Substance Use   Alcohol more than 3/day or more than 7/wk No   Do you use any other substances recreationally? No     Social History     Tobacco Use    Smoking status: Former     Current packs/day: 0.00      Average packs/day: 1 pack/day for 15.0 years (15.0 ttl pk-yrs)     Types: Cigarettes     Start date: 1985     Quit date: 2000     Years since quittin.3    Smokeless tobacco: Never   Vaping Use    Vaping status: Never Used   Substance Use Topics    Alcohol use: No     Alcohol/week: 0.0 standard drinks of alcohol     Comment: occas    Drug use: No             2024   Breast Cancer Screening   Family history of breast, colon, or ovarian cancer? No / Unknown         2023   LAST FHS-7 RESULTS   1st degree relative breast or ovarian cancer No   Any relative bilateral breast cancer No   Any male have breast cancer No   Any ONE woman have BOTH breast AND ovarian cancer No   Any woman with breast cancer before 50yrs No   2 or more relatives with breast AND/OR ovarian cancer No   2 or more relatives with breast AND/OR bowel cancer No     Mammogram Screening - Mammogram every 1-2 years updated in Health Maintenance based on mutual decision making        2024   STI Screening   New sexual partner(s) since last STI/HIV test? No     History of abnormal Pap smear: NO - age 30-65 PAP every 5 years with negative HPV co-testing recommended        Latest Ref Rng & Units 2021     3:09 PM 2021     3:08 PM 2016     8:30 AM   PAP / HPV   PAP (Historical)   NIL     HPV 16 DNA NEG^Negative Negative   Negative    HPV 18 DNA NEG^Negative Negative   Negative    Other HR HPV NEG^Negative Negative   Negative      ASCVD Risk   The 10-year ASCVD risk score (Yaneth WAKEFIELD, et al., 2019) is: 1.6%    Values used to calculate the score:      Age: 53 years      Sex: Female      Is Non- : No      Diabetic: No      Tobacco smoker: No      Systolic Blood Pressure: 132 mmHg      Is BP treated: Yes      HDL Cholesterol: 60 mg/dL      Total Cholesterol: 190 mg/dL         Reviewed and updated as needed this visit by Provider                          Review of Systems  Constitutional, HEENT,  "cardiovascular, pulmonary, gi and gu systems are negative, except as otherwise noted.     Objective    Exam  LMP  (LMP Unknown)    Estimated body mass index is 27.44 kg/m  as calculated from the following:    Height as of 3/21/23: 1.676 m (5' 6\").    Weight as of 11/7/23: 77.1 kg (170 lb).    Physical Exam  GENERAL: alert and no distress  EYES: Eyes grossly normal to inspection, PERRL and conjunctivae and sclerae normal  HENT: ear canals and TM's normal, nose and mouth without ulcers or lesions  NECK: no adenopathy, no asymmetry, masses, or scars  RESP: lungs clear to auscultation - no rales, rhonchi or wheezes  CV: regular rate and rhythm, normal S1 S2, no S3 or S4, no murmur, click or rub, no peripheral edema  ABDOMEN: soft, nontender, no hepatosplenomegaly, no masses and bowel sounds normal  MS: no gross musculoskeletal defects noted, no edema  SKIN: no suspicious lesions or rashes  NEURO: Normal strength and tone, mentation intact and speech normal  PSYCH: mentation appears normal, affect normal/bright        Signed Electronically by: Zahra Sullivan PA-C  Answers submitted by the patient for this visit:  Patient Health Questionnaire (Submitted on 4/18/2024)  If you checked off any problems, how difficult have these problems made it for you to do your work, take care of things at home, or get along with other people?: Not difficult at all  PHQ9 TOTAL SCORE: 3  ALEXANDRA-7 (Submitted on 4/18/2024)  ALEXANDRA 7 TOTAL SCORE: 1    "

## 2024-04-19 ENCOUNTER — LAB (OUTPATIENT)
Dept: LAB | Facility: CLINIC | Age: 54
End: 2024-04-19
Payer: COMMERCIAL

## 2024-04-19 DIAGNOSIS — Z83.2 FAMILY HISTORY OF HYPERCOAGULABILITY: ICD-10-CM

## 2024-04-19 DIAGNOSIS — Z13.220 LIPID SCREENING: ICD-10-CM

## 2024-04-19 DIAGNOSIS — Z13.1 SCREENING FOR DIABETES MELLITUS: ICD-10-CM

## 2024-04-19 LAB
FACTOR V INTERPRETATION: NORMAL
HBA1C MFR BLD: 4.8 % (ref 0–5.6)
LAB DIRECTOR COMMENTS: NORMAL
LAB DIRECTOR DISCLAIMER: NORMAL
LAB DIRECTOR INTERPRETATION: NORMAL
LAB DIRECTOR METHODOLOGY: NORMAL
LAB DIRECTOR RESULTS: NORMAL
LOCATION OF TASK: NORMAL
SPECIMEN DESCRIPTION: NORMAL

## 2024-04-19 PROCEDURE — 83036 HEMOGLOBIN GLYCOSYLATED A1C: CPT

## 2024-04-19 PROCEDURE — G0452 MOLECULAR PATHOLOGY INTERPR: HCPCS | Mod: 26 | Performed by: STUDENT IN AN ORGANIZED HEALTH CARE EDUCATION/TRAINING PROGRAM

## 2024-04-19 PROCEDURE — 85390 FIBRINOLYSINS SCREEN I&R: CPT | Performed by: PATHOLOGY

## 2024-04-19 PROCEDURE — 85306 CLOT INHIBIT PROT S FREE: CPT

## 2024-04-19 PROCEDURE — 85613 RUSSELL VIPER VENOM DILUTED: CPT

## 2024-04-19 PROCEDURE — 81241 F5 GENE: CPT

## 2024-04-19 PROCEDURE — 36415 COLL VENOUS BLD VENIPUNCTURE: CPT

## 2024-04-19 PROCEDURE — 80061 LIPID PANEL: CPT

## 2024-04-19 PROCEDURE — 85303 CLOT INHIBIT PROT C ACTIVITY: CPT

## 2024-04-19 PROCEDURE — 85730 THROMBOPLASTIN TIME PARTIAL: CPT

## 2024-04-20 LAB
CHOLEST SERPL-MCNC: 206 MG/DL
FASTING STATUS PATIENT QL REPORTED: ABNORMAL
HDLC SERPL-MCNC: 54 MG/DL
LDLC SERPL CALC-MCNC: 140 MG/DL
NONHDLC SERPL-MCNC: 152 MG/DL
TRIGL SERPL-MCNC: 62 MG/DL

## 2024-04-22 LAB
DRVVT SCREEN RATIO: 0.73
INR PPP: 0.95 (ref 0.85–1.15)
LA PPP-IMP: NEGATIVE
LUPUS INTERPRETATION: NORMAL
PTT RATIO: 1.06
THROMBIN TIME: 16.6 SECONDS (ref 13–19)

## 2024-04-23 LAB
PROT C ACT/NOR PPP CHRO: 90 % (ref 70–170)
PROT S FREE AG ACT/NOR PPP IA: 94 % (ref 55–125)

## 2024-05-14 ENCOUNTER — MYC MEDICAL ADVICE (OUTPATIENT)
Dept: FAMILY MEDICINE | Facility: CLINIC | Age: 54
End: 2024-05-14
Payer: COMMERCIAL

## 2024-05-14 DIAGNOSIS — N95.1 MENOPAUSAL SYNDROME (HOT FLASHES): Primary | ICD-10-CM

## 2024-05-14 RX ORDER — ESTRADIOL 0.5 MG/1
0.5 TABLET ORAL DAILY
Qty: 90 TABLET | Refills: 3 | Status: SHIPPED | OUTPATIENT
Start: 2024-05-14

## 2024-05-14 NOTE — TELEPHONE ENCOUNTER
Sending to PCP for review.  Please review and advise on patient MyChart message.    Patient last seen on 4/18/2024. Please advise on oral medication vs the patch.     Lula Chirinos RN  [facility]

## 2024-07-06 NOTE — PATIENT INSTRUCTIONS
Preventive Health Recommendations  Female Ages 40 to 49    Yearly exam:     See your health care provider every year in order to  1. Review health changes.   2. Discuss preventive care.    3. Review your medicines if your doctor prescribed any.      Get a Pap test every three years (unless you have an abnormal result and your provider advises testing more often).      If you get Pap tests with HPV test, you only need to test every 5 years, unless you have an abnormal result. You do not need a Pap test if your uterus was removed (hysterectomy) and you have not had cancer.      You should be tested each year for STDs (sexually transmitted diseases), if you're at risk.     Ask your doctor if you should have a mammogram.      Have a colonoscopy (test for colon cancer) if someone in your family has had colon cancer or polyps before age 50.       Have a cholesterol test every 5 years.       Have a diabetes test (fasting glucose) after age 45. If you are at risk for diabetes, you should have this test every 3 years.    Shots: Get a flu shot each year. Get a tetanus shot every 10 years.     Nutrition:     Eat at least 5 servings of fruits and vegetables each day.    Eat whole-grain bread, whole-wheat pasta and brown rice instead of white grains and rice.    Get adequate Calcium and Vitamin D.      Lifestyle    Exercise at least 150 minutes a week (an average of 30 minutes a day, 5 days a week). This will help you control your weight and prevent disease.    Limit alcohol to one drink per day.    No smoking.     Wear sunscreen to prevent skin cancer.    See your dentist every six months for an exam and cleaning.   English

## 2025-03-03 ENCOUNTER — PATIENT OUTREACH (OUTPATIENT)
Dept: CARE COORDINATION | Facility: CLINIC | Age: 55
End: 2025-03-03
Payer: COMMERCIAL

## 2025-03-04 ENCOUNTER — ANCILLARY PROCEDURE (OUTPATIENT)
Dept: BONE DENSITY | Facility: CLINIC | Age: 55
End: 2025-03-04
Attending: PHYSICIAN ASSISTANT
Payer: COMMERCIAL

## 2025-03-04 ENCOUNTER — HOSPITAL ENCOUNTER (OUTPATIENT)
Dept: MAMMOGRAPHY | Facility: CLINIC | Age: 55
Discharge: HOME | End: 2025-03-04
Attending: PHYSICIAN ASSISTANT
Payer: COMMERCIAL

## 2025-03-04 ENCOUNTER — LAB (OUTPATIENT)
Dept: LAB | Facility: CLINIC | Age: 55
End: 2025-03-04
Payer: COMMERCIAL

## 2025-03-04 DIAGNOSIS — I10 ESSENTIAL HYPERTENSION, BENIGN: ICD-10-CM

## 2025-03-04 DIAGNOSIS — Z12.31 ENCOUNTER FOR SCREENING MAMMOGRAM FOR BREAST CANCER: ICD-10-CM

## 2025-03-04 DIAGNOSIS — N95.1 MENOPAUSAL SYNDROME (HOT FLASHES): ICD-10-CM

## 2025-03-04 DIAGNOSIS — Z13.1 SCREENING FOR DIABETES MELLITUS: ICD-10-CM

## 2025-03-04 DIAGNOSIS — E78.5 DYSLIPIDEMIA: ICD-10-CM

## 2025-03-04 LAB
ANION GAP SERPL CALCULATED.3IONS-SCNC: 6 MMOL/L (ref 7–15)
BUN SERPL-MCNC: 12.6 MG/DL (ref 6–20)
CALCIUM SERPL-MCNC: 9.8 MG/DL (ref 8.8–10.4)
CHLORIDE SERPL-SCNC: 104 MMOL/L (ref 98–107)
CHOLEST SERPL-MCNC: 214 MG/DL
CREAT SERPL-MCNC: 0.65 MG/DL (ref 0.51–0.95)
EGFRCR SERPLBLD CKD-EPI 2021: >90 ML/MIN/1.73M2
EST. AVERAGE GLUCOSE BLD GHB EST-MCNC: 100 MG/DL
FASTING STATUS PATIENT QL REPORTED: YES
FASTING STATUS PATIENT QL REPORTED: YES
GLUCOSE SERPL-MCNC: 93 MG/DL (ref 70–99)
HBA1C MFR BLD: 5.1 % (ref 0–5.6)
HCO3 SERPL-SCNC: 30 MMOL/L (ref 22–29)
HDLC SERPL-MCNC: 54 MG/DL
LDLC SERPL CALC-MCNC: 133 MG/DL
NONHDLC SERPL-MCNC: 160 MG/DL
POTASSIUM SERPL-SCNC: 4.2 MMOL/L (ref 3.4–5.3)
SODIUM SERPL-SCNC: 140 MMOL/L (ref 135–145)
TRIGL SERPL-MCNC: 136 MG/DL

## 2025-03-04 PROCEDURE — 80061 LIPID PANEL: CPT

## 2025-03-04 PROCEDURE — 77080 DXA BONE DENSITY AXIAL: CPT | Mod: TC | Performed by: PHYSICIAN ASSISTANT

## 2025-03-04 PROCEDURE — 77063 BREAST TOMOSYNTHESIS BI: CPT

## 2025-03-04 PROCEDURE — 36415 COLL VENOUS BLD VENIPUNCTURE: CPT

## 2025-03-04 PROCEDURE — 77091 TBS TECHL CALCULATION ONLY: CPT | Performed by: PHYSICIAN ASSISTANT

## 2025-03-04 PROCEDURE — 83036 HEMOGLOBIN GLYCOSYLATED A1C: CPT

## 2025-03-04 PROCEDURE — 80048 BASIC METABOLIC PNL TOTAL CA: CPT

## 2025-04-10 ENCOUNTER — OFFICE VISIT (OUTPATIENT)
Dept: OPHTHALMOLOGY | Facility: CLINIC | Age: 55
End: 2025-04-10
Payer: COMMERCIAL

## 2025-04-10 DIAGNOSIS — H52.03 HYPEROPIA OF BOTH EYES WITH REGULAR ASTIGMATISM AND PRESBYOPIA: Primary | ICD-10-CM

## 2025-04-10 DIAGNOSIS — H52.223 HYPEROPIA OF BOTH EYES WITH REGULAR ASTIGMATISM AND PRESBYOPIA: Primary | ICD-10-CM

## 2025-04-10 DIAGNOSIS — H52.4 HYPEROPIA OF BOTH EYES WITH REGULAR ASTIGMATISM AND PRESBYOPIA: Primary | ICD-10-CM

## 2025-04-10 ASSESSMENT — REFRACTION_CURRENTRX
OS_DIAMETER: 14.1
OD_SPHERE: +2.25
OS_SPHERE: +3.25
OS_ADD: HIGH
OS_ADD: HIGH
OS_BASECURVE: 8.5
OD_SPHERE: +2.50
OS_ADD: HIGH
OD_SPHERE: +2.50
OD_BRAND: DAILIES TOTAL 1 MULTIFOCAL
OD_BRAND: DAILIES TOTAL 1
OS_DIAMETER: 14.1
OS_BRAND: DAILIES TOTAL 1 MULTIFOCAL
OS_DIAMETER: 14.1
OD_BASECURVE: 8.5
OS_BASECURVE: 8.5
OS_SPHERE: +3.25
OS_BRAND: DAILIES TOTAL 1 MULTIFOCAL
OD_BASECURVE: 8.5
OS_BRAND: DAILIES TOTAL 1 MULTIFOCAL
OD_ADD: LOW
OD_BASECURVE: 8.5
OS_SPHERE: +3.25
OD_ADD: LOW
OD_BRAND: DAILIES TOTAL 1 MULTIFOCAL
OD_DIAMETER: 14.1
OD_DIAMETER: 14.1
OS_BASECURVE: 8.5
OD_DIAMETER: 14.1

## 2025-04-10 ASSESSMENT — EXTERNAL EXAM - RIGHT EYE: OD_EXAM: NORMAL

## 2025-04-10 ASSESSMENT — REFRACTION_MANIFEST
OD_AXIS: 170
OS_AXIS: 178
OS_CYLINDER: +0.25
OD_ADD: +2.25
OS_SPHERE: +1.75
OD_SPHERE: +2.00
OS_ADD: +2.25
OD_CYLINDER: +0.75

## 2025-04-10 ASSESSMENT — EXTERNAL EXAM - LEFT EYE: OS_EXAM: NORMAL

## 2025-04-10 ASSESSMENT — CONF VISUAL FIELD
OD_SUPERIOR_NASAL_RESTRICTION: 0
OS_SUPERIOR_TEMPORAL_RESTRICTION: 0
METHOD: COUNTING FINGERS
OS_SUPERIOR_NASAL_RESTRICTION: 0
OS_INFERIOR_TEMPORAL_RESTRICTION: 0
OS_NORMAL: 1
OD_NORMAL: 1
OS_INFERIOR_NASAL_RESTRICTION: 0
OD_INFERIOR_TEMPORAL_RESTRICTION: 0
OD_INFERIOR_NASAL_RESTRICTION: 0
OD_SUPERIOR_TEMPORAL_RESTRICTION: 0

## 2025-04-10 ASSESSMENT — CUP TO DISC RATIO
OD_RATIO: 0.25
OS_RATIO: 0.25

## 2025-04-10 ASSESSMENT — VISUAL ACUITY
OS_CC: 20/20
CORRECTION_TYPE: CONTACTS
OD_CC: 20/15
METHOD: SNELLEN - LINEAR
OS_CC: J1
OD_CC: J5-2
OD_CC+: -1

## 2025-04-10 ASSESSMENT — REFRACTION_WEARINGRX
OD_AXIS: 170
OS_CYLINDER: +0.75
OS_ADD: +2.25
OD_CYLINDER: +0.75
OD_ADD: +2.25
OS_AXIS: 180
OD_SPHERE: +2.00
OS_SPHERE: +2.00

## 2025-04-10 ASSESSMENT — TONOMETRY
OD_IOP_MMHG: 16
IOP_METHOD: ICARE
OS_IOP_MMHG: 15

## 2025-04-10 ASSESSMENT — SLIT LAMP EXAM - LIDS
COMMENTS: MGD
COMMENTS: MGD

## 2025-04-10 NOTE — PROGRESS NOTES
A/P  1.) Hyperopia/Presbyopia OU  -Currently in modified monovision left eye near, but with decreasing near function lately.  -Today able to push more MF and slight plus right eye. Preferred low vs med add right eye in office due to distance blur with medium. Start with this combo but can push more add if needed. Will order trials  -Dilated ocular health unremarkable OU-    2.) Headache  -Recently several episodes of HA and blurry peripheral vision lasting <1 hr and self resolving. No previous h/o migraines.   -Vision/neuro testing intact, optic nerves healthy without edema, angles open  -Most likely dx ocular migraine but monitor for increasing frequency/consider PCP referral if needed    Will order trials and mail when arrive. Monitor 1-2 years comprehensive, sooner prn    I have confirmed the patient's CC, HPI and reviewed Past Medical History, Past Surgical History, Social History, Family History, Problem List, Medication List and agree with Tech note.     Damari Dunn, [facility]

## 2025-04-10 NOTE — NURSING NOTE
Chief Complaints and History of Present Illnesses   Patient presents with    COMPREHENSIVE EYE EXAM     Comprehensive exam with contacts     Chief Complaint(s) and History of Present Illness(es)       COMPREHENSIVE EYE EXAM              Laterality: both eyes    Associated symptoms: dryness    Treatments tried: artificial tears    Pain scale: 0/10    Comments: Comprehensive exam with contacts              Comments    Having to use reader with contacts now so the NVA is no longer clear.   Distance vision still seems fine.   Dryness with right eye > left eye. The past few months noticed.   Has been getting episode of headaches and the vision gets blurry in the periphery. Lasts about an hour and then resolves. Has had three episodes the past year.     Lawanda Flores, COT COT 7:22 AM April 10, 2025

## 2025-04-21 ENCOUNTER — DOCUMENTATION ONLY (OUTPATIENT)
Dept: OPTOMETRY | Facility: CLINIC | Age: 55
End: 2025-04-21

## 2025-04-21 NOTE — PROGRESS NOTES
Trial lenses arrived to clinic.   Mailing to address in chart.     ROEL Greer on 4/21/2025 at 2:33 PM

## (undated) DEVICE — SU VICRYL 0 TIE 54" J608H

## (undated) DEVICE — LIGHT HANDLE X1 31140133

## (undated) DEVICE — SUCTION IRR STRYKERFLOW II W/TIP 250-070-520

## (undated) DEVICE — Device

## (undated) DEVICE — ENDO CANNULA 05MM VERSAONE UNIVERSAL UNVCA5STF

## (undated) DEVICE — GLOVE PROTEXIS BLUE W/NEU-THERA 8.0  2D73EB80

## (undated) DEVICE — ENDO TROCAR OPTICAL 05MM VERSAPORT PLUS W/FIX CAN ONB5STF

## (undated) DEVICE — SU VICRYL 4-0 PS-2 18" UND J496H

## (undated) DEVICE — SYR 30ML LL W/O NDL 302832

## (undated) DEVICE — DEVICE SUTURE GRASPER TROCAR CLOSURE 14GA PMITCSG

## (undated) DEVICE — SU ENDO POLYSORB 0 3" LOOP 21" EL-21-L

## (undated) DEVICE — ENDO SHEARS 5MM 176643

## (undated) DEVICE — NDL INSUFFLATION 120MM VERRES 172015

## (undated) DEVICE — LINEN TOWEL PACK X6 WHITE 5487

## (undated) DEVICE — CLIP APPLIER ENDO 05MM MED/LG 176630

## (undated) DEVICE — DRSG PRIMAPORE 02X3" 7133

## (undated) DEVICE — PREP CHLORAPREP 26ML TINTED ORANGE  260815

## (undated) DEVICE — SU VICRYL 3-0 SH 27" J316H

## (undated) DEVICE — COVER CAMERA IN-LIGHT DISP LT-C02

## (undated) DEVICE — ENDO TROCAR 12MM VERSAONE BLADELESS W/STD FIX CAN NONB12STF

## (undated) DEVICE — DRSG STERI STRIP 1/2X4" R1547

## (undated) DEVICE — ANTIFOG SOLUTION W/FOAM PAD 31142527

## (undated) DEVICE — BAG SPECIMEN NYLON MEMORY WIRE 3"X6" SB1036

## (undated) DEVICE — ESU GROUND PAD ADULT W/CORD E7507

## (undated) DEVICE — SOL NACL 0.9% INJ 1000ML BAG 07983-09

## (undated) DEVICE — GLOVE PROTEXIS POWDER FREE SMT 7.5  2D72PT75X

## (undated) DEVICE — SOL WATER IRRIG 1000ML BOTTLE 2F7114

## (undated) DEVICE — LINEN TOWEL PACK X30 5481

## (undated) RX ORDER — HEPARIN SODIUM 1000 [USP'U]/ML
INJECTION, SOLUTION INTRAVENOUS; SUBCUTANEOUS
Status: DISPENSED
Start: 2018-05-21

## (undated) RX ORDER — PHENYLEPHRINE HCL IN 0.9% NACL 1 MG/10 ML
SYRINGE (ML) INTRAVENOUS
Status: DISPENSED
Start: 2018-05-22

## (undated) RX ORDER — HYDROMORPHONE HYDROCHLORIDE 1 MG/ML
INJECTION, SOLUTION INTRAMUSCULAR; INTRAVENOUS; SUBCUTANEOUS
Status: DISPENSED
Start: 2018-05-22

## (undated) RX ORDER — CEFAZOLIN SODIUM 2 G/100ML
INJECTION, SOLUTION INTRAVENOUS
Status: DISPENSED
Start: 2018-05-22

## (undated) RX ORDER — ALBUMIN, HUMAN INJ 5% 5 %
SOLUTION INTRAVENOUS
Status: DISPENSED
Start: 2018-05-22

## (undated) RX ORDER — FENTANYL CITRATE 50 UG/ML
INJECTION, SOLUTION INTRAMUSCULAR; INTRAVENOUS
Status: DISPENSED
Start: 2018-05-22

## (undated) RX ORDER — PROPOFOL 10 MG/ML
INJECTION, EMULSION INTRAVENOUS
Status: DISPENSED
Start: 2018-05-22

## (undated) RX ORDER — LIDOCAINE HYDROCHLORIDE 20 MG/ML
INJECTION, SOLUTION EPIDURAL; INFILTRATION; INTRACAUDAL; PERINEURAL
Status: DISPENSED
Start: 2018-05-22

## (undated) RX ORDER — SCOLOPAMINE TRANSDERMAL SYSTEM 1 MG/1
PATCH, EXTENDED RELEASE TRANSDERMAL
Status: DISPENSED
Start: 2018-05-22

## (undated) RX ORDER — NITROGLYCERIN 5 MG/ML
VIAL (ML) INTRAVENOUS
Status: DISPENSED
Start: 2018-05-21

## (undated) RX ORDER — FENTANYL CITRATE 50 UG/ML
INJECTION, SOLUTION INTRAMUSCULAR; INTRAVENOUS
Status: DISPENSED
Start: 2018-05-21

## (undated) RX ORDER — DEXAMETHASONE SODIUM PHOSPHATE 4 MG/ML
INJECTION, SOLUTION INTRA-ARTICULAR; INTRALESIONAL; INTRAMUSCULAR; INTRAVENOUS; SOFT TISSUE
Status: DISPENSED
Start: 2018-05-22

## (undated) RX ORDER — EPHEDRINE SULFATE 50 MG/ML
INJECTION, SOLUTION INTRAMUSCULAR; INTRAVENOUS; SUBCUTANEOUS
Status: DISPENSED
Start: 2018-05-22

## (undated) RX ORDER — HYDROMORPHONE HCL/0.9% NACL/PF 0.2MG/0.2
SYRINGE (ML) INTRAVENOUS
Status: DISPENSED
Start: 2018-05-22

## (undated) RX ORDER — ACETAMINOPHEN 325 MG/1
TABLET ORAL
Status: DISPENSED
Start: 2018-05-22

## (undated) RX ORDER — ONDANSETRON 2 MG/ML
INJECTION INTRAMUSCULAR; INTRAVENOUS
Status: DISPENSED
Start: 2018-05-22